# Patient Record
Sex: FEMALE | Race: WHITE | NOT HISPANIC OR LATINO | Employment: OTHER | URBAN - METROPOLITAN AREA
[De-identification: names, ages, dates, MRNs, and addresses within clinical notes are randomized per-mention and may not be internally consistent; named-entity substitution may affect disease eponyms.]

---

## 2023-08-23 ENCOUNTER — HOSPITAL ENCOUNTER (OUTPATIENT)
Dept: DATA CONVERSION | Facility: HOSPITAL | Age: 63
Discharge: HOME | End: 2023-08-23
Payer: MEDICARE

## 2023-08-23 DIAGNOSIS — Z87.891 PERSONAL HISTORY OF NICOTINE DEPENDENCE: ICD-10-CM

## 2023-08-23 DIAGNOSIS — Z12.31 ENCOUNTER FOR SCREENING MAMMOGRAM FOR MALIGNANT NEOPLASM OF BREAST: ICD-10-CM

## 2023-08-23 DIAGNOSIS — Z78.0 ASYMPTOMATIC MENOPAUSAL STATE: ICD-10-CM

## 2023-08-23 DIAGNOSIS — M19.011 PRIMARY OSTEOARTHRITIS, RIGHT SHOULDER: ICD-10-CM

## 2023-08-29 ENCOUNTER — HOSPITAL ENCOUNTER (OUTPATIENT)
Dept: DATA CONVERSION | Facility: HOSPITAL | Age: 63
End: 2023-08-29

## 2023-08-29 DIAGNOSIS — M19.011 PRIMARY OSTEOARTHRITIS, RIGHT SHOULDER: ICD-10-CM

## 2023-08-31 PROBLEM — E83.52 HYPERCALCEMIA: Status: ACTIVE | Noted: 2023-08-31

## 2023-08-31 PROBLEM — G43.909 MIGRAINE: Status: ACTIVE | Noted: 2023-08-31

## 2023-08-31 PROBLEM — E78.5 HYPERLIPIDEMIA: Status: ACTIVE | Noted: 2023-08-31

## 2023-08-31 PROBLEM — I10 ESSENTIAL HYPERTENSION: Status: ACTIVE | Noted: 2023-08-31

## 2023-08-31 PROBLEM — Z78.9 MEDICAL HOME PATIENT: Status: ACTIVE | Noted: 2023-08-31

## 2023-08-31 PROBLEM — E03.9 HYPOTHYROIDISM: Status: ACTIVE | Noted: 2023-08-31

## 2023-08-31 PROBLEM — N95.1 FEMALE CLIMACTERIC: Status: ACTIVE | Noted: 2023-08-31

## 2023-08-31 PROBLEM — F32.A DEPRESSIVE DISORDER: Status: ACTIVE | Noted: 2023-08-31

## 2023-08-31 PROBLEM — M19.90 ARTHRITIS: Status: ACTIVE | Noted: 2023-08-31

## 2023-08-31 PROBLEM — N20.0 CALCULUS OF KIDNEY: Status: ACTIVE | Noted: 2023-08-31

## 2023-08-31 PROBLEM — R94.31 ELECTROCARDIOGRAM ABNORMAL: Status: ACTIVE | Noted: 2023-08-31

## 2023-08-31 PROBLEM — E55.9 VITAMIN D DEFICIENCY: Status: ACTIVE | Noted: 2023-08-31

## 2023-08-31 PROBLEM — I45.81 LONG QT SYNDROME: Status: ACTIVE | Noted: 2023-08-31

## 2023-08-31 PROBLEM — D72.829 LEUKOCYTOSIS: Status: ACTIVE | Noted: 2023-08-31

## 2023-08-31 PROBLEM — G89.29 CHRONIC PAIN: Status: ACTIVE | Noted: 2023-08-31

## 2023-08-31 RX ORDER — BUPROPION HYDROCHLORIDE 300 MG/1
1 TABLET ORAL EVERY MORNING
COMMUNITY
End: 2023-10-05 | Stop reason: SDUPTHER

## 2023-08-31 RX ORDER — ATORVASTATIN CALCIUM 40 MG/1
40 TABLET, FILM COATED ORAL DAILY
COMMUNITY
End: 2024-01-09 | Stop reason: SDUPTHER

## 2023-08-31 RX ORDER — NAPROXEN SODIUM 220 MG
440 TABLET ORAL
COMMUNITY

## 2023-08-31 RX ORDER — SERTRALINE HYDROCHLORIDE 100 MG/1
200 TABLET, FILM COATED ORAL DAILY
COMMUNITY
Start: 2023-01-06 | End: 2024-01-09 | Stop reason: SDUPTHER

## 2023-08-31 RX ORDER — LOSARTAN POTASSIUM 100 MG/1
1 TABLET ORAL DAILY
COMMUNITY

## 2023-08-31 RX ORDER — VIT C/E/ZN/COPPR/LUTEIN/ZEAXAN 250MG-90MG
1 CAPSULE ORAL DAILY
COMMUNITY

## 2023-08-31 RX ORDER — LEVOTHYROXINE SODIUM 200 UG/1
200 TABLET ORAL
COMMUNITY
End: 2024-01-09 | Stop reason: ALTCHOICE

## 2023-09-02 RX ORDER — AMOXICILLIN 500 MG/1
CAPSULE ORAL
COMMUNITY
Start: 2023-03-20 | End: 2023-10-03 | Stop reason: ALTCHOICE

## 2023-09-02 RX ORDER — ALBUTEROL SULFATE 90 UG/1
2 AEROSOL, METERED RESPIRATORY (INHALATION) EVERY 4 HOURS PRN
COMMUNITY
Start: 2023-01-06 | End: 2023-10-03 | Stop reason: ALTCHOICE

## 2023-09-02 RX ORDER — CYCLOBENZAPRINE HCL 10 MG
TABLET ORAL
COMMUNITY
Start: 2023-06-22 | End: 2023-10-03 | Stop reason: ALTCHOICE

## 2023-09-02 RX ORDER — METHYLPREDNISOLONE 4 MG/1
TABLET ORAL
COMMUNITY
Start: 2023-06-22 | End: 2023-10-03 | Stop reason: ALTCHOICE

## 2023-10-03 ENCOUNTER — LAB (OUTPATIENT)
Dept: LAB | Facility: LAB | Age: 63
End: 2023-10-03
Payer: MEDICARE

## 2023-10-03 ENCOUNTER — PRE-ADMISSION TESTING (OUTPATIENT)
Dept: PREADMISSION TESTING | Facility: HOSPITAL | Age: 63
End: 2023-10-03
Payer: MEDICARE

## 2023-10-03 VITALS
TEMPERATURE: 96.3 F | BODY MASS INDEX: 34.58 KG/M2 | OXYGEN SATURATION: 97 % | WEIGHT: 215.17 LBS | HEIGHT: 66 IN | RESPIRATION RATE: 16 BRPM | HEART RATE: 66 BPM

## 2023-10-03 DIAGNOSIS — M19.011 PRIMARY OSTEOARTHRITIS OF RIGHT SHOULDER: ICD-10-CM

## 2023-10-03 DIAGNOSIS — M19.011 PRIMARY OSTEOARTHRITIS OF RIGHT SHOULDER: Primary | ICD-10-CM

## 2023-10-03 DIAGNOSIS — M25.511 RIGHT SHOULDER PAIN, UNSPECIFIED CHRONICITY: Primary | ICD-10-CM

## 2023-10-03 DIAGNOSIS — E03.9 HYPOTHYROIDISM, UNSPECIFIED TYPE: ICD-10-CM

## 2023-10-03 LAB
ANION GAP SERPL CALC-SCNC: 12 MMOL/L
APPEARANCE UR: CLEAR
BILIRUB UR STRIP.AUTO-MCNC: NEGATIVE MG/DL
BUN SERPL-MCNC: 30 MG/DL (ref 8–25)
CALCIUM SERPL-MCNC: 9.8 MG/DL (ref 8.5–10.4)
CAOX CRY #/AREA UR COMP ASSIST: NORMAL /HPF
CHLORIDE SERPL-SCNC: 100 MMOL/L (ref 97–107)
CO2 SERPL-SCNC: 28 MMOL/L (ref 24–31)
COLOR UR: ABNORMAL
CREAT SERPL-MCNC: 0.8 MG/DL (ref 0.4–1.6)
GFR SERPL CREATININE-BSD FRML MDRD: 82 ML/MIN/1.73M*2
GLUCOSE SERPL-MCNC: 100 MG/DL (ref 65–99)
GLUCOSE UR STRIP.AUTO-MCNC: NORMAL MG/DL
KETONES UR STRIP.AUTO-MCNC: NEGATIVE MG/DL
LEUKOCYTE ESTERASE UR QL STRIP.AUTO: ABNORMAL
NITRITE UR QL STRIP.AUTO: NEGATIVE
PH UR STRIP.AUTO: 5.5 [PH]
POTASSIUM SERPL-SCNC: 5.2 MMOL/L (ref 3.4–5.1)
PROT UR STRIP.AUTO-MCNC: ABNORMAL MG/DL
RBC # UR STRIP.AUTO: NEGATIVE /UL
RBC #/AREA URNS AUTO: NORMAL /HPF
SODIUM SERPL-SCNC: 140 MMOL/L (ref 133–145)
SP GR UR STRIP.AUTO: 1.03
TSH SERPL DL<=0.05 MIU/L-ACNC: 1.65 MIU/L (ref 0.27–4.2)
UROBILINOGEN UR STRIP.AUTO-MCNC: NORMAL MG/DL
WBC #/AREA URNS AUTO: NORMAL /HPF

## 2023-10-03 PROCEDURE — 99203 OFFICE O/P NEW LOW 30 MIN: CPT | Performed by: PHYSICIAN ASSISTANT

## 2023-10-03 PROCEDURE — 36415 COLL VENOUS BLD VENIPUNCTURE: CPT

## 2023-10-03 PROCEDURE — 87086 URINE CULTURE/COLONY COUNT: CPT

## 2023-10-03 PROCEDURE — 87081 CULTURE SCREEN ONLY: CPT | Mod: CMCLAB,WESLAB | Performed by: PHYSICIAN ASSISTANT

## 2023-10-03 PROCEDURE — 81001 URINALYSIS AUTO W/SCOPE: CPT

## 2023-10-03 PROCEDURE — 80048 BASIC METABOLIC PNL TOTAL CA: CPT

## 2023-10-03 PROCEDURE — 84443 ASSAY THYROID STIM HORMONE: CPT

## 2023-10-03 RX ORDER — CHLORHEXIDINE GLUCONATE ORAL RINSE 1.2 MG/ML
15 SOLUTION DENTAL
Status: DISCONTINUED | OUTPATIENT
Start: 2023-10-12 | End: 2023-10-13 | Stop reason: HOSPADM

## 2023-10-03 RX ORDER — ACETAMINOPHEN 500 MG
1000 TABLET ORAL EVERY 8 HOURS PRN
COMMUNITY
End: 2024-01-09 | Stop reason: ALTCHOICE

## 2023-10-03 RX ORDER — CHLORHEXIDINE GLUCONATE ORAL RINSE 1.2 MG/ML
15 SOLUTION DENTAL AS NEEDED
Qty: 30 ML | Refills: 0 | Status: SHIPPED | OUTPATIENT
Start: 2023-10-12 | End: 2023-10-13 | Stop reason: HOSPADM

## 2023-10-03 ASSESSMENT — DUKE ACTIVITY SCORE INDEX (DASI)
CAN YOU RUN A SHORT DISTANCE: NO
CAN YOU PARTICIPATE IN MODERATE RECREATIONAL ACTIVITIES LIKE GOLF, BOWLING, DANCING, DOUBLES TENNIS OR THROWING A BASEBALL OR FOOTBALL: NO
CAN YOU WALK A BLOCK OR TWO ON LEVEL GROUND: NO
CAN YOU DO LIGHT WORK AROUND THE HOUSE LIKE DUSTING OR WASHING DISHES: YES
CAN YOU DO MODERATE WORK AROUND THE HOUSE LIKE VACUUMING, SWEEPING FLOORS OR CARRYING GROCERIES: YES
CAN YOU CLIMB A FLIGHT OF STAIRS OR WALK UP A HILL: YES
CAN YOU WALK INDOORS, SUCH AS AROUND YOUR HOUSE: YES
CAN YOU DO YARD WORK LIKE RAKING LEAVES, WEEDING OR PUSHING A MOWER: YES
CAN YOU DO HEAVY WORK AROUND THE HOUSE LIKE SCRUBBING FLOORS OR LIFTING AND MOVING HEAVY FURNITURE: YES
CAN YOU PARTICIPATE IN STRENOUS SPORTS LIKE SWIMMING, SINGLES TENNIS, FOOTBALL, BASKETBALL, OR SKIING: NO

## 2023-10-03 ASSESSMENT — LIFESTYLE VARIABLES: SMOKING_STATUS: SMOKER

## 2023-10-03 ASSESSMENT — CHADS2 SCORE
CHF: NO
DIABETES: NO
CHADS2 SCORE: 1
PRIOR STROKE OR TIA OR THROMBOEMBOLISM: NO
HYPERTENSION: YES
AGE GREATER THAN OR EQUAL TO 75: NO

## 2023-10-03 ASSESSMENT — PAIN - FUNCTIONAL ASSESSMENT: PAIN_FUNCTIONAL_ASSESSMENT: 0-10

## 2023-10-03 ASSESSMENT — PAIN SCALES - GENERAL: PAINLEVEL_OUTOF10: 5 - MODERATE PAIN

## 2023-10-03 ASSESSMENT — PAIN DESCRIPTION - DESCRIPTORS: DESCRIPTORS: ACHING;DULL

## 2023-10-03 ASSESSMENT — ACTIVITIES OF DAILY LIVING (ADL): ADL_SCORE: 0

## 2023-10-03 NOTE — CPM/PAT H&P
CPM/PAT Evaluation       Name: Monse Aguilera (Carlee Aguilera)  /Age: 1960/63 y.o.     In-Person       Chief Complaint: Right shoulder pain    HPI 63-year-old female complains of right shoulder pain    Past Medical History:   Diagnosis Date    Anxiety     Arthritis     Depression     History of transfusion     Hypothyroidism     Irregular heart beat     Nephrolithiasis        Past Surgical History:   Procedure Laterality Date    BUNIONECTOMY      CARDIAC DEFIBRILLATOR PLACEMENT Left     JOINT REPLACEMENT      LUMBAR FUSION      LUMBAR LAMINECTOMY      OOPHORECTOMY         Patient  has no history on file for sexual activity.    Family History   Problem Relation Name Age of Onset    Other (elevated lipids) Mother      Rheum arthritis Mother      Diabetes Mother      Hypertension Mother      Other (Elevated lipids) Father      Coronary artery disease Father      Kidney failure Father      Hypertension Father      Diabetes Father      Heart disease Father      Other (thyroid problems) Sister      Other (Cardiac arrest) Son      Hyperlipidemia Sibling         Allergies   Allergen Reactions    Adhesive Rash    Other Other    Silver-Calcium Alginate Rash    Sulfa (Sulfonamide Antibiotics) Rash       Prior to Admission medications    Medication Sig Start Date End Date Taking? Authorizing Provider   acetaminophen (Tylenol) 500 mg tablet Take 2 tablets (1,000 mg) by mouth every 8 hours if needed for mild pain (1 - 3).    Historical Provider, MD   atorvastatin (Lipitor) 40 mg tablet Take 1 tablet (40 mg) by mouth once daily.    Historical Provider, MD   buPROPion XL (Wellbutrin XL) 300 mg 24 hr tablet Take 1 tablet (300 mg) by mouth once daily in the morning.    Historical Provider, MD   cholecalciferol (Vitamin D-3) 25 MCG (1000 UT) capsule Take 1 capsule (25 mcg) by mouth once daily.    Historical Provider, MD   levothyroxine (Synthroid, Levoxyl) 200 mcg tablet Take 1 tablet (200 mcg) by mouth once daily in the  morning. Take before meals.    Historical Provider, MD   losartan (Cozaar) 100 mg tablet Take 1 tablet (100 mg) by mouth once daily.    Historical Provider, MD   multivitamin/iron/folic acid (MULTI COMPLETE WITH IRON ORAL) Take 1 tablet by mouth once daily.    Historical Provider, MD   naproxen sodium (Aleve) 220 mg tablet Take 2 tablets (440 mg) by mouth 2 times a day with meals.    Historical Provider, MD   propranolol XL (Innopran XL) 120 mg 24 hr capsule Take 2 capsules (240 mg) by mouth once daily.    Historical Provider, MD   sertraline (Zoloft) 100 mg tablet Take 2 tablets (200 mg) by mouth once daily. 1/6/23   Historical Provider, MD   albuterol 90 mcg/actuation inhaler Inhale 2 puffs every 4 hours if needed. 1/6/23 10/3/23  Historical Provider, MD   amoxicillin (Amoxil) 500 mg capsule TAKE ONE CAPSULE BY MOUTH EVERY 8 HOURS UNTIL GONE 3/20/23 10/3/23  Historical Provider, MD   cyclobenzaprine (Flexeril) 10 mg tablet TAKE ONE TABLET BY MOUTH EVERY 8 HOURS AS NEEDED FOR MUSCLE SPASM 6/22/23 10/3/23  Historical Provider, MD   methylPREDNISolone (Medrol Dospak) 4 mg tablets use as directed 6/22/23 10/3/23  Historical Provider, MD        Review of Systems   All other systems reviewed and are negative.       Physical Exam  Constitutional:       Appearance: Normal appearance.   HENT:      Head: Normocephalic and atraumatic.      Mouth/Throat:      Mouth: Mucous membranes are moist.   Eyes:      Extraocular Movements: Extraocular movements intact.      Pupils: Pupils are equal, round, and reactive to light.   Cardiovascular:      Rate and Rhythm: Normal rate and regular rhythm.   Abdominal:      General: Bowel sounds are normal.      Palpations: Abdomen is soft.   Musculoskeletal:         General: Normal range of motion.      Cervical back: Normal range of motion.   Neurological:      General: No focal deficit present.      Mental Status: She is alert and oriented to person, place, and time.   Psychiatric:          Mood and Affect: Mood normal.         Behavior: Behavior normal.          Airway    Visit Vitals  Pulse 66   Temp 35.7 °C (96.3 °F) (Tympanic)   Resp 16       DASI Risk Score    No data to display       Caprini DVT Assessment      Flowsheet Row Most Recent Value   DVT Score 6   Current Status Major surgery planned, including arthroscopic and laproscopic (1-2 hours)   History Prior major surgery   Age 60-75 years   BMI 31-40 (Obesity)          Modified Frailty Index      Flowsheet Row Most Recent Value   Modified Frailty Index Calculator .1818          CHADS2 Stroke Risk  Current as of a minute ago        N/A 3 - 100%: High Risk   2 - 3%: Medium Risk   0 - 2%: Low Risk     Last Change: N/A          This score determines the patient's risk of having a stroke if the patient has atrial fibrillation.        This score is not applicable to this patient. Components are not calculated.          Revised Cardiac Risk Index      Flowsheet Row Most Recent Value   Revised Cardiac Risk Calculator 0          Apfel Simplified Score      Flowsheet Row Most Recent Value   Apfel Simplified Score Calculator 2          Risk Analysis Index Results This Encounter         10/3/2023  0908             BARBA Cancer History: Patient does not indicate history of cancer    Total Risk Analysis Index Score Without Cancer: 21    Total Risk Analysis Index Score: 21          Stop Bang Score      Flowsheet Row Most Recent Value   Do you snore loudly? 0   Do you often feel tired or fatigued after your sleep? 0   Has anyone ever observed you stop breathing in your sleep? 0   Do you have or are you being treated for high blood pressure? 1   Recent BMI (Calculated) 34.8   Is BMI greater than 35 kg/m2? 0=No   Age older than 50 years old? 1=Yes   Is your neck circumference greater than 17 inches (Male) or 16 inches (Female)? 0            Assessment and Plan:     Musculoskeletal:     Right shoulder osteoarthritis             Plan: Right total shoulder  replacement 10/13/2023

## 2023-10-03 NOTE — PREPROCEDURE INSTRUCTIONS
Medication List            Accurate as of October 3, 2023  9:10 AM. Always use your most recent med list.                acetaminophen 500 mg tablet  Commonly known as: Tylenol     Aleve 220 mg tablet  Generic drug: naproxen sodium  Notes to patient: Stop 5 days before     atorvastatin 40 mg tablet  Commonly known as: Lipitor     buPROPion  mg 24 hr tablet  Commonly known as: Wellbutrin XL  Medication Adjustments for Surgery: Take morning of surgery with sip of water, no other fluids     cholecalciferol 25 MCG (1000 UT) capsule  Commonly known as: Vitamin D-3  Medication Adjustments for Surgery: Stop 7 days before surgery     levothyroxine 200 mcg tablet  Commonly known as: Synthroid, Levoxyl  Medication Adjustments for Surgery: Take morning of surgery with sip of water, no other fluids     losartan 100 mg tablet  Commonly known as: Cozaar  Notes to patient: Do not take morning of surgery     MULTI COMPLETE WITH IRON ORAL  Medication Adjustments for Surgery: Stop 7 days before surgery     propranolol  mg 24 hr capsule  Commonly known as: Innopran XL  Medication Adjustments for Surgery: Take morning of surgery with sip of water, no other fluids     Zoloft 100 mg tablet  Generic drug: sertraline  Medication Adjustments for Surgery: Take morning of surgery with sip of water, no other fluids                              NPO Instructions:    Do not eat any food after midnight the night before your surgery/procedure.    Additional Instructions:     Begin using your [facility] PAT DISCHARGE INSTRUCTIONS    Please call the Same Day Surgery Department of the hospital where your procedure will be performed after 2:00PM the day before your surgery. If you are scheduled on a Monday or a Tuesday following a Monday holiday, you will need to call on the last business day prior to your surgery date.      [facility]  [address] [place] [address] [phone]    [facility]  [address]  [place] [address]  [phone]    [facility]  [address] [place] [address]  [phone]        Please let your surgeon know if:      You develop any open sores, shingles, burning or painful urination as these may increase your risk of an infection.   You no longer wish to have the surgery.   Any other personal circumstances change that may lead to the need to cancel or defer this surgery-such as being sick or getting admitted to any hospital within one week of your planned procedure.    Your contact details change, such as a change of address or phone number.    Starting now:     Please DO NOT drink alcohol or smoke for 24 before surgery. It is well known that quitting smoking can make a huge difference to your health and recovery from surgery. The longer you abstain from smoking, the better your chances of a healthy recovery. If you need help with quitting, call 2-771-QUIT-NOW to be connected to a trained counselor who will discuss the best methods to help you quit.       Before your surgery:     Please stop all supplements 7 days prior to surgery.    Please stop taking NSAID pain medicine such as Advil and Motrin 5 days before surgery or as instructed by your surgeon.   If you develop any fever, cough, cold, rashes, cuts, scratches, scrapes, urinary symptoms or infection anywhere on your body (including teeth and gums) prior to surgery, please call your surgeon’s office as soon as possible. This may require treatment to reduce the chance of cancellation on the day of surgery.    The day before your surgery:     DIET- Do not eat any solid food or drink after midnight the night before surgery. This includes gum, hard candy, mints and coffee.    Get a good night’s rest. Use the special soap for bathing if you have been instructed to use one.    Arrival time is typically 2 hours prior to the time of surgery.    Scheduled surgery times may change and you will be notified  if this occurs - please check your personal voicemail for any updates.     On the morning of surgery:   Wear comfortable, loose fitting clothes which open in the front. Please do not wear moisturizers, creams, lotions, perfume or makeup.     Please bring with you to surgery:   Photo ID and insurance card   Current list of medicines and allergies   Pacemaker/ Defibrillator/Heart stent cards   CPAP machine and mask    Slings/ splints/ crutches   A copy of your complete advanced directive/DHPOA.    Please do NOT bring with you to surgery:   All jewelry and valuables should be left at home.   Prosthetic devices such as contact lenses, hearing aids, dentures, eyelash extensions, hairpins and body piercings must be removed prior to going in to the surgical suite.      After outpatient surgery:   A responsible adult MUST accompany you at the time of discharge and stay with you for 24 hours after your surgery. You may NOT drive yourself home after surgery.    Do not drive, operate machinery, make critical decisions or do activities that require co-ordination or balance until after a night’s sleep.   Do not drink alcoholic beverages for 24 hours.   Instructions for resuming your medications will be provided by your surgeon.      CALL YOUR DOCTOR AFTER SURGERY IF YOU HAVE:     Chills and/or a fever of 101° F or higher.    Redness, swelling, pus or drainage from your surgical wound or a bad smell from the wound.    Lightheadedness, fainting or confusion.    Persistent vomiting (throwing up) and are not able to eat or drink for 12 hours.    Three or more loose, watery bowel movements in 24 hours (diarrhea).   Difficulty or pain while urinating( after non-urological surgery)    Pain and swelling in your legs, especially if it is only on one side.    Difficulty breathing or are breathing faster than normal.    Any new concerning symptoms.

## 2023-10-04 ENCOUNTER — TELEPHONE (OUTPATIENT)
Dept: PRIMARY CARE | Facility: CLINIC | Age: 63
End: 2023-10-04
Payer: MEDICARE

## 2023-10-04 DIAGNOSIS — G43.E19 INTRACTABLE CHRONIC MIGRAINE WITH AURA AND WITHOUT STATUS MIGRAINOSUS: Primary | ICD-10-CM

## 2023-10-04 DIAGNOSIS — F32.A DEPRESSIVE DISORDER: ICD-10-CM

## 2023-10-04 NOTE — TELEPHONE ENCOUNTER
From ans machine 10/04/23 @12:31pm:  Pt asking for refills for Propanolol 90 days dispensing 180 and Bupropion 300 mg for 90 days to [facility].

## 2023-10-05 LAB
BACTERIA UR CULT: NORMAL
STAPHYLOCOCCUS SPEC CULT: NORMAL

## 2023-10-05 RX ORDER — BUPROPION HYDROCHLORIDE 300 MG/1
300 TABLET ORAL EVERY MORNING
Qty: 90 TABLET | Refills: 0 | Status: SHIPPED | OUTPATIENT
Start: 2023-10-05 | End: 2023-10-09 | Stop reason: SDUPTHER

## 2023-10-09 ENCOUNTER — TELEPHONE (OUTPATIENT)
Dept: PRIMARY CARE | Facility: CLINIC | Age: 63
End: 2023-10-09
Payer: MEDICARE

## 2023-10-09 DIAGNOSIS — F32.A DEPRESSIVE DISORDER: ICD-10-CM

## 2023-10-09 DIAGNOSIS — G43.E19 INTRACTABLE CHRONIC MIGRAINE WITH AURA AND WITHOUT STATUS MIGRAINOSUS: ICD-10-CM

## 2023-10-09 RX ORDER — BUPROPION HYDROCHLORIDE 300 MG/1
300 TABLET ORAL EVERY MORNING
Qty: 90 TABLET | Refills: 0 | Status: SHIPPED | OUTPATIENT
Start: 2023-10-09 | End: 2023-10-09 | Stop reason: SDUPTHER

## 2023-10-09 RX ORDER — BUPROPION HYDROCHLORIDE 300 MG/1
300 TABLET ORAL EVERY MORNING
Qty: 90 TABLET | Refills: 0 | Status: SHIPPED | OUTPATIENT
Start: 2023-10-09 | End: 2024-01-09 | Stop reason: SDUPTHER

## 2023-10-11 ENCOUNTER — PREP FOR PROCEDURE (OUTPATIENT)
Dept: ORTHOPEDIC SURGERY | Facility: CLINIC | Age: 63
End: 2023-10-11
Payer: MEDICARE

## 2023-10-11 DIAGNOSIS — M19.011 OSTEOARTHRITIS OF RIGHT SHOULDER, UNSPECIFIED OSTEOARTHRITIS TYPE: Primary | ICD-10-CM

## 2023-10-11 RX ORDER — CEFAZOLIN SODIUM 2 G/100ML
2 INJECTION, SOLUTION INTRAVENOUS ONCE
Status: CANCELLED | OUTPATIENT
Start: 2023-10-11 | End: 2023-10-11

## 2023-10-11 RX ORDER — CEFAZOLIN SODIUM 2 G/100ML
2 INJECTION, SOLUTION INTRAVENOUS EVERY 8 HOURS
Status: CANCELLED | OUTPATIENT
Start: 2023-10-11

## 2023-10-12 PROBLEM — M19.011 OSTEOARTHRITIS OF RIGHT SHOULDER: Status: ACTIVE | Noted: 2023-10-11

## 2023-10-13 ENCOUNTER — ANESTHESIA (OUTPATIENT)
Dept: OPERATING ROOM | Facility: HOSPITAL | Age: 63
End: 2023-10-13
Payer: MEDICARE

## 2023-10-13 ENCOUNTER — ANESTHESIA EVENT (OUTPATIENT)
Dept: OPERATING ROOM | Facility: HOSPITAL | Age: 63
End: 2023-10-13
Payer: MEDICARE

## 2023-10-13 ENCOUNTER — HOSPITAL ENCOUNTER (OUTPATIENT)
Facility: HOSPITAL | Age: 63
Discharge: HOME | End: 2023-10-13
Attending: ORTHOPAEDIC SURGERY | Admitting: INTERNAL MEDICINE
Payer: MEDICARE

## 2023-10-13 VITALS
DIASTOLIC BLOOD PRESSURE: 56 MMHG | TEMPERATURE: 96.8 F | HEART RATE: 79 BPM | SYSTOLIC BLOOD PRESSURE: 113 MMHG | RESPIRATION RATE: 18 BRPM | OXYGEN SATURATION: 93 %

## 2023-10-13 DIAGNOSIS — M19.011 PRIMARY OSTEOARTHRITIS OF RIGHT SHOULDER: ICD-10-CM

## 2023-10-13 DIAGNOSIS — M19.011 ARTHRITIS OF RIGHT SHOULDER REGION: Primary | ICD-10-CM

## 2023-10-13 LAB
ERYTHROCYTE [DISTWIDTH] IN BLOOD BY AUTOMATED COUNT: 13.7 % (ref 11.5–14.5)
HCT VFR BLD AUTO: 45.4 % (ref 36–46)
HGB BLD-MCNC: 14.7 G/DL (ref 12–16)
MCH RBC QN AUTO: 29.1 PG (ref 26–34)
MCHC RBC AUTO-ENTMCNC: 32.4 G/DL (ref 32–36)
MCV RBC AUTO: 90 FL (ref 80–100)
NRBC BLD-RTO: 0 /100 WBCS (ref 0–0)
PLATELET # BLD AUTO: 306 X10*3/UL (ref 150–450)
PMV BLD AUTO: 10.6 FL (ref 7.5–11.5)
RBC # BLD AUTO: 5.05 X10*6/UL (ref 4–5.2)
WBC # BLD AUTO: 10.8 X10*3/UL (ref 4.4–11.3)

## 2023-10-13 PROCEDURE — 2500000005 HC RX 250 GENERAL PHARMACY W/O HCPCS: Performed by: ANESTHESIOLOGIST ASSISTANT

## 2023-10-13 PROCEDURE — 3600000009 HC OR TIME - EACH INCREMENTAL 1 MINUTE - PROCEDURE LEVEL FOUR: Performed by: ORTHOPAEDIC SURGERY

## 2023-10-13 PROCEDURE — 7100000010 HC PHASE TWO TIME - EACH INCREMENTAL 1 MINUTE: Performed by: ORTHOPAEDIC SURGERY

## 2023-10-13 PROCEDURE — 7100000002 HC RECOVERY ROOM TIME - EACH INCREMENTAL 1 MINUTE: Performed by: ORTHOPAEDIC SURGERY

## 2023-10-13 PROCEDURE — 94640 AIRWAY INHALATION TREATMENT: CPT

## 2023-10-13 PROCEDURE — 2500000002 HC RX 250 W HCPCS SELF ADMINISTERED DRUGS (ALT 637 FOR MEDICARE OP, ALT 636 FOR OP/ED): Mod: MUE | Performed by: ANESTHESIOLOGY

## 2023-10-13 PROCEDURE — 7100000009 HC PHASE TWO TIME - INITIAL BASE CHARGE: Performed by: ORTHOPAEDIC SURGERY

## 2023-10-13 PROCEDURE — 2500000004 HC RX 250 GENERAL PHARMACY W/ HCPCS (ALT 636 FOR OP/ED): Performed by: ANESTHESIOLOGY

## 2023-10-13 PROCEDURE — 76942 ECHO GUIDE FOR BIOPSY: CPT | Performed by: ANESTHESIOLOGY

## 2023-10-13 PROCEDURE — 3700000002 HC GENERAL ANESTHESIA TIME - EACH INCREMENTAL 1 MINUTE: Performed by: ORTHOPAEDIC SURGERY

## 2023-10-13 PROCEDURE — A23472 PR RECONSTR TOTAL SHOULDER IMPLANT: Performed by: ANESTHESIOLOGIST ASSISTANT

## 2023-10-13 PROCEDURE — 2500000005 HC RX 250 GENERAL PHARMACY W/O HCPCS: Performed by: ORTHOPAEDIC SURGERY

## 2023-10-13 PROCEDURE — 2720000007 HC OR 272 NO HCPCS: Performed by: ORTHOPAEDIC SURGERY

## 2023-10-13 PROCEDURE — 7100000001 HC RECOVERY ROOM TIME - INITIAL BASE CHARGE: Performed by: ORTHOPAEDIC SURGERY

## 2023-10-13 PROCEDURE — 36415 COLL VENOUS BLD VENIPUNCTURE: CPT | Performed by: ANESTHESIOLOGY

## 2023-10-13 PROCEDURE — 2500000001 HC RX 250 WO HCPCS SELF ADMINISTERED DRUGS (ALT 637 FOR MEDICARE OP): Performed by: ORTHOPAEDIC SURGERY

## 2023-10-13 PROCEDURE — 2580000001 HC RX 258 IV SOLUTIONS: Performed by: ANESTHESIOLOGIST ASSISTANT

## 2023-10-13 PROCEDURE — C1776 JOINT DEVICE (IMPLANTABLE): HCPCS | Performed by: ORTHOPAEDIC SURGERY

## 2023-10-13 PROCEDURE — 2500000004 HC RX 250 GENERAL PHARMACY W/ HCPCS (ALT 636 FOR OP/ED): Performed by: ANESTHESIOLOGIST ASSISTANT

## 2023-10-13 PROCEDURE — C1713 ANCHOR/SCREW BN/BN,TIS/BN: HCPCS | Performed by: ORTHOPAEDIC SURGERY

## 2023-10-13 PROCEDURE — 3700000001 HC GENERAL ANESTHESIA TIME - INITIAL BASE CHARGE: Performed by: ORTHOPAEDIC SURGERY

## 2023-10-13 PROCEDURE — 2780000003 HC OR 278 NO HCPCS: Performed by: ORTHOPAEDIC SURGERY

## 2023-10-13 PROCEDURE — A23472 PR RECONSTR TOTAL SHOULDER IMPLANT: Performed by: ANESTHESIOLOGY

## 2023-10-13 PROCEDURE — 85027 COMPLETE CBC AUTOMATED: CPT | Performed by: ANESTHESIOLOGY

## 2023-10-13 PROCEDURE — 3600000004 HC OR TIME - INITIAL BASE CHARGE - PROCEDURE LEVEL FOUR: Performed by: ORTHOPAEDIC SURGERY

## 2023-10-13 PROCEDURE — 7100000011 HC EXTENDED STAY RECOVERY HOURLY - NURSING UNIT

## 2023-10-13 DEVICE — IMPLANTABLE DEVICE: Type: IMPLANTABLE DEVICE | Site: SHOULDER | Status: FUNCTIONAL

## 2023-10-13 DEVICE — DYNANITE VIP GLENOID PIN, NITINOL, 2.8MM
Type: IMPLANTABLE DEVICE | Site: SHOULDER | Status: FUNCTIONAL
Brand: ARTHREX®

## 2023-10-13 RX ORDER — IPRATROPIUM BROMIDE 0.5 MG/2.5ML
500 SOLUTION RESPIRATORY (INHALATION) ONCE
Status: DISCONTINUED | OUTPATIENT
Start: 2023-10-13 | End: 2023-10-13 | Stop reason: HOSPADM

## 2023-10-13 RX ORDER — LABETALOL HYDROCHLORIDE 5 MG/ML
5 INJECTION, SOLUTION INTRAVENOUS ONCE AS NEEDED
Status: DISCONTINUED | OUTPATIENT
Start: 2023-10-13 | End: 2023-10-13 | Stop reason: HOSPADM

## 2023-10-13 RX ORDER — MELOXICAM 7.5 MG/1
15 TABLET ORAL ONCE
Status: COMPLETED | OUTPATIENT
Start: 2023-10-13 | End: 2023-10-13

## 2023-10-13 RX ORDER — ONDANSETRON HYDROCHLORIDE 2 MG/ML
4 INJECTION, SOLUTION INTRAVENOUS ONCE AS NEEDED
Status: DISCONTINUED | OUTPATIENT
Start: 2023-10-13 | End: 2023-10-13 | Stop reason: HOSPADM

## 2023-10-13 RX ORDER — NORETHINDRONE AND ETHINYL ESTRADIOL 0.5-0.035
KIT ORAL AS NEEDED
Status: DISCONTINUED | OUTPATIENT
Start: 2023-10-13 | End: 2023-10-13

## 2023-10-13 RX ORDER — FENTANYL CITRATE 50 UG/ML
INJECTION, SOLUTION INTRAMUSCULAR; INTRAVENOUS AS NEEDED
Status: DISCONTINUED | OUTPATIENT
Start: 2023-10-13 | End: 2023-10-13

## 2023-10-13 RX ORDER — SODIUM CHLORIDE, SODIUM LACTATE, POTASSIUM CHLORIDE, CALCIUM CHLORIDE 600; 310; 30; 20 MG/100ML; MG/100ML; MG/100ML; MG/100ML
50 INJECTION, SOLUTION INTRAVENOUS CONTINUOUS
Status: CANCELLED | OUTPATIENT
Start: 2023-10-13

## 2023-10-13 RX ORDER — NEOSTIGMINE METHYLSULFATE 1 MG/ML
INJECTION, SOLUTION INTRAVENOUS AS NEEDED
Status: DISCONTINUED | OUTPATIENT
Start: 2023-10-13 | End: 2023-10-13

## 2023-10-13 RX ORDER — MIDAZOLAM HYDROCHLORIDE 1 MG/ML
2 INJECTION, SOLUTION INTRAMUSCULAR; INTRAVENOUS ONCE
Status: COMPLETED | OUTPATIENT
Start: 2023-10-13 | End: 2023-10-13

## 2023-10-13 RX ORDER — OXYCODONE HYDROCHLORIDE 5 MG/1
5 TABLET ORAL EVERY 6 HOURS PRN
Qty: 25 TABLET | Refills: 0 | Status: SHIPPED | OUTPATIENT
Start: 2023-10-13 | End: 2023-10-19

## 2023-10-13 RX ORDER — OXYCODONE HCL 10 MG/1
20 TABLET, FILM COATED, EXTENDED RELEASE ORAL ONCE
Status: COMPLETED | OUTPATIENT
Start: 2023-10-13 | End: 2023-10-13

## 2023-10-13 RX ORDER — ACETAMINOPHEN 500 MG
500 TABLET ORAL ONCE
Status: DISCONTINUED | OUTPATIENT
Start: 2023-10-13 | End: 2023-10-13 | Stop reason: HOSPADM

## 2023-10-13 RX ORDER — CEFAZOLIN SODIUM 2 G/100ML
INJECTION, SOLUTION INTRAVENOUS
Status: COMPLETED
Start: 2023-10-13 | End: 2023-10-13

## 2023-10-13 RX ORDER — CEFAZOLIN SODIUM 2 G/100ML
INJECTION, SOLUTION INTRAVENOUS AS NEEDED
Status: DISCONTINUED | OUTPATIENT
Start: 2023-10-13 | End: 2023-10-13

## 2023-10-13 RX ORDER — MIDAZOLAM HYDROCHLORIDE 1 MG/ML
2 INJECTION INTRAMUSCULAR; INTRAVENOUS ONCE
Status: CANCELLED | OUTPATIENT
Start: 2023-10-13

## 2023-10-13 RX ORDER — LIDOCAINE HYDROCHLORIDE 10 MG/ML
INJECTION INFILTRATION; PERINEURAL AS NEEDED
Status: DISCONTINUED | OUTPATIENT
Start: 2023-10-13 | End: 2023-10-13

## 2023-10-13 RX ORDER — PHENYLEPHRINE HCL IN 0.9% NACL 1 MG/10 ML
SYRINGE (ML) INTRAVENOUS AS NEEDED
Status: DISCONTINUED | OUTPATIENT
Start: 2023-10-13 | End: 2023-10-13

## 2023-10-13 RX ORDER — TRANEXAMIC ACID 100 MG/ML
INJECTION, SOLUTION INTRAVENOUS AS NEEDED
Status: DISCONTINUED | OUTPATIENT
Start: 2023-10-13 | End: 2023-10-13

## 2023-10-13 RX ORDER — HYDRALAZINE HYDROCHLORIDE 20 MG/ML
5 INJECTION INTRAMUSCULAR; INTRAVENOUS EVERY 30 MIN PRN
Status: DISCONTINUED | OUTPATIENT
Start: 2023-10-13 | End: 2023-10-13 | Stop reason: HOSPADM

## 2023-10-13 RX ORDER — ALBUTEROL SULFATE 0.83 MG/ML
2.5 SOLUTION RESPIRATORY (INHALATION) ONCE AS NEEDED
Status: COMPLETED | OUTPATIENT
Start: 2023-10-13 | End: 2023-10-13

## 2023-10-13 RX ORDER — FENTANYL CITRATE 50 UG/ML
50 INJECTION, SOLUTION INTRAMUSCULAR; INTRAVENOUS EVERY 5 MIN PRN
Status: DISCONTINUED | OUTPATIENT
Start: 2023-10-13 | End: 2023-10-13 | Stop reason: HOSPADM

## 2023-10-13 RX ORDER — ROCURONIUM BROMIDE 10 MG/ML
INJECTION, SOLUTION INTRAVENOUS AS NEEDED
Status: DISCONTINUED | OUTPATIENT
Start: 2023-10-13 | End: 2023-10-13

## 2023-10-13 RX ORDER — FENTANYL CITRATE 50 UG/ML
100 INJECTION, SOLUTION INTRAMUSCULAR; INTRAVENOUS ONCE
Status: COMPLETED | OUTPATIENT
Start: 2023-10-13 | End: 2023-10-13

## 2023-10-13 RX ORDER — ONDANSETRON HYDROCHLORIDE 2 MG/ML
INJECTION, SOLUTION INTRAVENOUS AS NEEDED
Status: DISCONTINUED | OUTPATIENT
Start: 2023-10-13 | End: 2023-10-13

## 2023-10-13 RX ORDER — PROPOFOL 10 MG/ML
INJECTION, EMULSION INTRAVENOUS AS NEEDED
Status: DISCONTINUED | OUTPATIENT
Start: 2023-10-13 | End: 2023-10-13

## 2023-10-13 RX ORDER — ACETAMINOPHEN 500 MG
1000 TABLET ORAL EVERY 6 HOURS PRN
Qty: 30 TABLET | Refills: 0 | Status: SHIPPED | OUTPATIENT
Start: 2023-10-13 | End: 2024-01-09 | Stop reason: WASHOUT

## 2023-10-13 RX ORDER — GLYCOPYRROLATE 0.2 MG/ML
INJECTION INTRAMUSCULAR; INTRAVENOUS AS NEEDED
Status: DISCONTINUED | OUTPATIENT
Start: 2023-10-13 | End: 2023-10-13

## 2023-10-13 RX ORDER — PREGABALIN 75 MG/1
75 CAPSULE ORAL DAILY
Status: COMPLETED | OUTPATIENT
Start: 2023-10-13 | End: 2023-10-13

## 2023-10-13 RX ADMIN — OXYCODONE HYDROCHLORIDE 20 MG: 10 TABLET, FILM COATED, EXTENDED RELEASE ORAL at 08:03

## 2023-10-13 RX ADMIN — ROCURONIUM BROMIDE 20 MG: 10 INJECTION, SOLUTION INTRAVENOUS at 09:07

## 2023-10-13 RX ADMIN — Medication 200 MCG: at 08:42

## 2023-10-13 RX ADMIN — GLYCOPYRROLATE 0.6 MG: 0.2 INJECTION INTRAMUSCULAR; INTRAVENOUS at 11:11

## 2023-10-13 RX ADMIN — Medication 150 MCG: at 10:46

## 2023-10-13 RX ADMIN — MELOXICAM 15 MG: 7.5 TABLET ORAL at 08:04

## 2023-10-13 RX ADMIN — Medication 150 MCG: at 10:10

## 2023-10-13 RX ADMIN — Medication 150 MCG: at 10:22

## 2023-10-13 RX ADMIN — Medication 200 MCG: at 10:01

## 2023-10-13 RX ADMIN — POVIDONE-IODINE 1 KIT: 5 SOLUTION TOPICAL at 06:15

## 2023-10-13 RX ADMIN — SODIUM CHLORIDE, SODIUM LACTATE, POTASSIUM CHLORIDE, AND CALCIUM CHLORIDE: 600; 310; 30; 20 INJECTION, SOLUTION INTRAVENOUS at 08:20

## 2023-10-13 RX ADMIN — Medication 100 MCG: at 10:08

## 2023-10-13 RX ADMIN — EPHEDRINE SULFATE 5 MG: 50 INJECTION, SOLUTION INTRAVENOUS at 09:29

## 2023-10-13 RX ADMIN — ROCURONIUM BROMIDE 10 MG: 10 INJECTION, SOLUTION INTRAVENOUS at 10:29

## 2023-10-13 RX ADMIN — EPHEDRINE SULFATE 5 MG: 50 INJECTION, SOLUTION INTRAVENOUS at 09:45

## 2023-10-13 RX ADMIN — EPHEDRINE SULFATE 5 MG: 50 INJECTION, SOLUTION INTRAVENOUS at 09:19

## 2023-10-13 RX ADMIN — GLYCOPYRROLATE 0.4 MG: 0.2 INJECTION INTRAMUSCULAR; INTRAVENOUS at 10:55

## 2023-10-13 RX ADMIN — ROCURONIUM BROMIDE 50 MG: 10 INJECTION, SOLUTION INTRAVENOUS at 08:27

## 2023-10-13 RX ADMIN — CEFAZOLIN SODIUM 2 G: 2 INJECTION, SOLUTION INTRAVENOUS at 08:33

## 2023-10-13 RX ADMIN — Medication 200 MCG: at 08:43

## 2023-10-13 RX ADMIN — LIDOCAINE HYDROCHLORIDE 5 ML: 10 INJECTION, SOLUTION INFILTRATION; PERINEURAL at 08:27

## 2023-10-13 RX ADMIN — FENTANYL CITRATE 50 MCG: 50 INJECTION, SOLUTION INTRAMUSCULAR; INTRAVENOUS at 08:27

## 2023-10-13 RX ADMIN — DEXAMETHASONE SODIUM PHOSPHATE 4 MG: 4 INJECTION, SOLUTION INTRAMUSCULAR; INTRAVENOUS at 10:26

## 2023-10-13 RX ADMIN — Medication 150 MCG: at 10:31

## 2023-10-13 RX ADMIN — ROCURONIUM BROMIDE 10 MG: 10 INJECTION, SOLUTION INTRAVENOUS at 10:01

## 2023-10-13 RX ADMIN — Medication 200 MCG: at 08:55

## 2023-10-13 RX ADMIN — TRANEXAMIC ACID 1000 MG: 100 INJECTION, SOLUTION INTRAVENOUS at 08:42

## 2023-10-13 RX ADMIN — Medication 150 MCG: at 11:02

## 2023-10-13 RX ADMIN — SODIUM CHLORIDE, SODIUM LACTATE, POTASSIUM CHLORIDE, AND CALCIUM CHLORIDE: 600; 310; 30; 20 INJECTION, SOLUTION INTRAVENOUS at 09:34

## 2023-10-13 RX ADMIN — EPHEDRINE SULFATE 10 MG: 50 INJECTION, SOLUTION INTRAVENOUS at 09:32

## 2023-10-13 RX ADMIN — Medication 150 MCG: at 09:02

## 2023-10-13 RX ADMIN — EPHEDRINE SULFATE 5 MG: 50 INJECTION, SOLUTION INTRAVENOUS at 09:47

## 2023-10-13 RX ADMIN — Medication 100 MCG: at 09:40

## 2023-10-13 RX ADMIN — Medication 200 MCG: at 11:10

## 2023-10-13 RX ADMIN — PREGABALIN 75 MG: 75 CAPSULE ORAL at 08:03

## 2023-10-13 RX ADMIN — Medication 200 MCG: at 10:37

## 2023-10-13 RX ADMIN — NEOSTIGMINE METHYLSULFATE 2 MG: 1 INJECTION, SOLUTION INTRAVENOUS at 10:55

## 2023-10-13 RX ADMIN — PROPOFOL 140 MG: 10 INJECTION, EMULSION INTRAVENOUS at 08:27

## 2023-10-13 RX ADMIN — Medication 200 MCG: at 10:25

## 2023-10-13 RX ADMIN — FENTANYL CITRATE 25 MCG: 50 INJECTION, SOLUTION INTRAMUSCULAR; INTRAVENOUS at 08:55

## 2023-10-13 RX ADMIN — Medication 100 MCG: at 10:52

## 2023-10-13 RX ADMIN — Medication 100 MCG: at 09:45

## 2023-10-13 RX ADMIN — Medication 250 MCG: at 09:11

## 2023-10-13 RX ADMIN — FENTANYL CITRATE 50 MCG: 50 INJECTION INTRAMUSCULAR; INTRAVENOUS at 08:04

## 2023-10-13 RX ADMIN — EPHEDRINE SULFATE 5 MG: 50 INJECTION, SOLUTION INTRAVENOUS at 09:40

## 2023-10-13 RX ADMIN — ALBUTEROL SULFATE 2.5 MG: 2.5 SOLUTION RESPIRATORY (INHALATION) at 12:36

## 2023-10-13 RX ADMIN — ROCURONIUM BROMIDE 20 MG: 10 INJECTION, SOLUTION INTRAVENOUS at 09:36

## 2023-10-13 RX ADMIN — ONDANSETRON HYDROCHLORIDE 4 MG: 2 INJECTION INTRAMUSCULAR; INTRAVENOUS at 10:49

## 2023-10-13 RX ADMIN — EPHEDRINE SULFATE 5 MG: 50 INJECTION, SOLUTION INTRAVENOUS at 09:13

## 2023-10-13 RX ADMIN — Medication 100 MCG: at 09:34

## 2023-10-13 RX ADMIN — Medication 150 MCG: at 10:57

## 2023-10-13 RX ADMIN — NEOSTIGMINE METHYLSULFATE 3 MG: 1 INJECTION, SOLUTION INTRAVENOUS at 11:11

## 2023-10-13 RX ADMIN — Medication 100 MCG: at 09:47

## 2023-10-13 RX ADMIN — Medication 200 MCG: at 11:07

## 2023-10-13 RX ADMIN — EPHEDRINE SULFATE 5 MG: 50 INJECTION, SOLUTION INTRAVENOUS at 10:01

## 2023-10-13 RX ADMIN — FENTANYL CITRATE 25 MCG: 50 INJECTION, SOLUTION INTRAMUSCULAR; INTRAVENOUS at 08:50

## 2023-10-13 RX ADMIN — Medication 150 MCG: at 10:16

## 2023-10-13 RX ADMIN — Medication 150 MCG: at 10:40

## 2023-10-13 RX ADMIN — MIDAZOLAM HYDROCHLORIDE 2 MG: 1 INJECTION, SOLUTION INTRAMUSCULAR; INTRAVENOUS at 08:06

## 2023-10-13 RX ADMIN — EPHEDRINE SULFATE 5 MG: 50 INJECTION, SOLUTION INTRAVENOUS at 09:34

## 2023-10-13 SDOH — HEALTH STABILITY: MENTAL HEALTH: CURRENT SMOKER: 1

## 2023-10-13 ASSESSMENT — PAIN - FUNCTIONAL ASSESSMENT
PAIN_FUNCTIONAL_ASSESSMENT: 0-10
PAIN_FUNCTIONAL_ASSESSMENT: WONG-BAKER FACES
PAIN_FUNCTIONAL_ASSESSMENT: 0-10
PAIN_FUNCTIONAL_ASSESSMENT: WONG-BAKER FACES
PAIN_FUNCTIONAL_ASSESSMENT: 0-10

## 2023-10-13 ASSESSMENT — PAIN SCALES - GENERAL
PAINLEVEL_OUTOF10: 0 - NO PAIN
PAINLEVEL_OUTOF10: 8
PAINLEVEL_OUTOF10: 0 - NO PAIN
PAINLEVEL_OUTOF10: 0 - NO PAIN

## 2023-10-13 NOTE — OP NOTE
RIGHT ANATOMIC TOTAL SHOULDER REPLACEMENT (ARTREX (ECLYPSE VS APAX) REVERSE AVIALABLE, REQ NUSRAT, NATASHA, DECATES, HAGOPIAN  *PRE OP BLOCK* (R) Operative Note     Date: 10/13/2023  OR Location: [facility] OR    Name: Monse Aguilera, : 1960, Age: 63 y.o., MRN: [id number], Sex: female    Diagnosis  Pre-op Diagnosis     * Osteoarthritis of right shoulder, unspecified osteoarthritis type [M19.011] Post-op Diagnosis     * Osteoarthritis of right shoulder, unspecified osteoarthritis type [M19.011]     Procedures    * RIGHT ANATOMIC TOTAL SHOULDER REPLACEMENT (ARTREX (ECLYPSE VS APAX) REVERSE AVIALABLE, REQ NUSRAT, NATASHA, DECATES, HAGOPIAN  *PRE OP BLOCK*    Surgeons      * Anderson Tenorio - Primary    Resident/Fellow/Other Assistant:  Surgical Assistant: Loli Segundo    Procedure Summary  Anesthesia: General  ASA: III  Anesthesia Staff: Anesthesiologist: José Mckeon MD  C-AA: KISHOR Cruz  Estimated Blood Loss: 100mL  Intra-op Medications:   Medication Name Total Dose   ceFAZolin in dextrose (iso-os) (Ancef) 2 gram/100 mL IVPB  - Omnicell Override Pull Cannot be calculated   fentaNYL PF (Sublimaze) injection 100 mcg 50 mcg   meloxicam (Mobic) tablet 15 mg 15 mg   midazolam (Versed) injection 2 mg 2 mg   oxyCODONE ER (OxyCONTIN) 12 hr tablet 20 mg 20 mg   pregabalin (Lyrica) capsule 75 mg 75 mg              Anesthesia Record               Intraprocedure I/O Totals          Intake    LR 1000.00 mL    Phenylephrine Drip 0.00 mL    The total shown is the total volume documented since Anesthesia Start was filed.    Total Intake 1000 mL       Output    Est. Blood Loss 300 mL    Total Output 300 mL       Net    Net Volume 700 mL          Specimen: No specimens collected     Staff:   Circulator: Wes Lyons RN  Scrub Person: Ashly Addison         Drains and/or Catheters: * None in log *    Tourniquet Times:         Implants:  Implants       Type Name Action Serial No.      Joint GLENOID PIN, TARGETER, 2.8MM,  STAINLESS - [id number] Implanted      Joint IMPLANT SYSTEM, ECLIPSE SPEEDSCAP - [id number] Implanted      Joint SCREW, ECLIPSE CAGE, LARGE, 40MM - [id number] Implanted      Joint ECLIPSE TRUNNION, SLOTTED, TPS CAP, 45MM - [id number] Implanted      Joint HEAD, ECLIPSE HUMERAL, 45/17 - [id number] Implanted               Findings: Arthritis right shoulder    Indications: Monse Aguilera is an 63 y.o. female who is having surgery for M19.011.     The patient was seen in the preoperative area. The risks, benefits, complications, treatment options, non-operative alternatives, expected recovery and outcomes were discussed with the patient. The possibilities of reaction to medication, pulmonary aspiration, injury to surrounding structures, bleeding, recurrent infection, the need for additional procedures, failure to diagnose a condition, and creating a complication requiring transfusion or operation were discussed with the patient. The patient concurred with the proposed plan, giving informed consent.  The site of surgery was properly noted/marked if necessary per policy. The patient has been actively warmed in preoperative area. Preoperative antibiotics have been ordered and given within 1 hours of incision. Venous thrombosis prophylaxis are not indicated.    Procedure Details:   Patient taken the operating and administered general anesthesia after receiving a preoperative block.  She is placed in the beachchair position.  She was then prepped and draped in the usual sterile fashion.  Bony landmarks were identified and marked.  Standard incision was made starting the coracoid process extending over the upper arm.  Subtenons tissue was dissected with the Bovie.  The cephalic vein was identified.  The deltopectoral interval was opened.  The cephalic vein was retracted laterally with the deltoid.  Small venous branches were cauterized as needed.  The deltoid was mobilized the humeral head and a Brown retractor was inserted.  The  clavipectoral fascia was incised.  The axillary nerve was identified and protected about the case.  The leading edge of the pectoralis tendon was released.  The bicipital groove was opened.  It was traced up proximally through the rotator interval to the base of the coracoid process.  The biceps was then tenodesed to the upper edge of the pectoralis tendon with a #2FiberWire suture.  Remainder the biceps was tenotomized and removed.  A subscapularis peel was performed.  The inferior capsule was released.  The head was delivered into the field.  Peripheral osteophytes were removed.  An extramedullary cutting guide was used to marin the location of the head cut.  Anatomic head cut was performed with care taken to preserve the rotator cuff insertion.  The upper end of the humerus were repaired.  A size 45 trunnion was placed over the operative the humerus.  It was felt to fit well.  It was impacted down.  Central reaming was performed.  The chest piece was inserted.  A guidewire was inserted.  Large cage screw was noted.  And Was then inserted.  Attention was directed towards the glenoid.  The posterior glenoid retractor was inserted.  The subscapularis was released superior medially and inferiorly with my finger placed over the axillary nerve.  Peripheral labral tissue was removed.  Biceps stump was removed.  The inferior capsular release was performed with my finger over the axillary nerve.  The glenoid was exposed.  Anterior glenoid retractor was inserted.  The central aspect of the glenoid was marked.  Next using a drill guide a guidewire was placed into the central portion of the glenoid.  It was then reamed centrally.  At that point using a guide superior and inferior drill holes were performed.  The inferior holes were connected with the broach.  Trial glenoid was inserted and felt to fit well.  That point the glenoid was prepared for cement.  It was first prepared with peroxide sponge.  It was then irrigated and  dried sponge was inserted.  Cement was then mixed to it doughy consistency it was inserted 3 times in the superior and inferior holes.  Excess cement was removed.  At that point a medium glenoid was impacted down feel the fit quite well.  Since then directed towards the humerus.  Anchors were then placed in the medial edge of the humerus.  3 sets of anchors were placed.  The 45 trunnion was then impacted over the chest piece.  A central cage was inserted.  It had excellent purchase.  The humeral head was trialed.  A size 45 was felt to be the appropriate size.  At that point 2 holes were made for placement of additional anchors.  One was placed in the greater tuberosity.  Was placed in the bicipital groove inferiorly.  45/17 Eclipse head was then impacted down over the upper end of the humerus.  Was then reduced.  It had allowed 40 degrees external rotation with good coverage, 50% pushback, and 60 degrees internal rotation.  Attention was then directed towards placement of the sutures.  Sutures were passed to the subscapularis using a scorpion.  The FiberWire sutures passed through the subscap were then tied as a medial row.  With the arm held in proxy 4 degrees external rotation.  Next 1 suture from each anchor was loaded through a Arthrex corkscrew anchor.  One was placed in the greater tuberosity and one was placed in the bicipital groove inferiorly.  Excess sutures were removed.  A fiber tape suture was placed in the rotator interval.  Extensive irrigation was performed.  Vancomycin powder was inserted.  The deltopectoral interval was closed with 0.0 Vicryl.  Tissues closed with 3.0 Vicryl.  It was closed with four-point 0 Monocryl subcuticular suture.  Steri-Strips were applied.  Placed in a sterile dressing and sling.  She taught procedure well.  No complications.  She was taken to recovery in stable condition.  .  Complications:  None; patient tolerated the procedure well.    Disposition: PACU -  hemodynamically stable.  Condition: stable         Additional Details:     Attending Attestation: I was present and scrubbed for the entire procedure.    Anderson Tenorio  Phone Number: [phone]       Yes

## 2023-10-13 NOTE — ANESTHESIA PROCEDURE NOTES
Peripheral Block    Patient location during procedure: post-op  Start time: 10/13/2023 8:10 AM  End time: 10/13/2023 8:13 AM  Reason for block: at surgeon's request and post-op pain management  Staffing  Performed: attending   Authorized by: José Mckeon MD    Performed by: José Mckeon MD  Preanesthetic Checklist  Completed: patient identified, IV checked, site marked, risks and benefits discussed, surgical consent, monitors and equipment checked, pre-op evaluation and timeout performed   Timeout performed at: 10/13/2023 8:03 AM  Peripheral Block  Prep: alcohol swabs  Patient monitoring: heart rate, cardiac monitor and continuous pulse ox  Block type: interscalene  Laterality: right  Injection technique: single-shot  Guidance: nerve stimulator and ultrasound guided  Needle  Needle type: short-bevel   Needle gauge: 22 G  Needle length: 5 cm  Needle localization: anatomical landmarks, ultrasound guidance and nerve stimulator  Needle insertion depth: 4 cm  Assessment  Injection assessment: negative aspiration for heme, no paresthesia on injection, incremental injection and local visualized surrounding nerve on ultrasound  Paresthesia pain: none  Heart rate change: no  Slow fractionated injection: yes

## 2023-10-13 NOTE — ANESTHESIA PROCEDURE NOTES
Airway  Date/Time: 10/13/2023 8:31 AM  Urgency: elective    Airway not difficult    Staffing  Performed: KISHOR   Authorized by: José Mckeon MD    Performed by: KISHOR Cruz  Patient location during procedure: OR    Indications and Patient Condition  Indications for airway management: anesthesia and airway protection  Spontaneous ventilation: present  Sedation level: deep  Preoxygenated: yes  Patient position: sniffing  Mask difficulty assessment: 1 - vent by mask  No planned trial extubation    Final Airway Details  Final airway type: endotracheal airway      Successful airway: ETT  Cuffed: yes   Successful intubation technique: direct laryngoscopy  Facilitating devices/methods: intubating stylet  Endotracheal tube insertion site: oral  Blade: Kari  Blade size: #3  ETT size (mm): 7.5  Cormack-Lehane Classification: grade I - full view of glottis  Placement verified by: chest auscultation and capnometry   Cuff volume (mL): 7  Measured from: gums  ETT to gums (cm): 23  Number of attempts at approach: 1

## 2023-10-13 NOTE — PERIOPERATIVE NURSING NOTE
0603--Patient ambulated to [facility] unaccompanied but  Shon will be picking her up if she is not admitted overnight. Patient is alert and oriented, says her right shoulder pain is 8 at this time and reports her skin is intact.  Patient states she did her CHG wash for the last 5 days and used listerine last night and this morning because she did not receive the mouthwash.     0645--Patient ambulated to the restroom.    0716--Patient taken to PACU for nerve block.    0720--Spoke to Dr. Mckeon.  STAT CBC order will be placed.    0723--Stat CBC sent.

## 2023-10-13 NOTE — ANESTHESIA POSTPROCEDURE EVALUATION
Patient: Monse Aguilera    Procedure Summary       Date: 10/13/23 Room / Location: [facility] OR 05 / [facility]    Anesthesia Start: 0820 Anesthesia Stop:     Procedure: RIGHT ANATOMIC TOTAL SHOULDER REPLACEMENT (ARTREX (ECLYPSE VS APAX) REVERSE AVIALABLE, REQ NUSRAT, NATASHA, QIAN, HAGOPIAN  *PRE OP BLOCK* (Right: Shoulder) Diagnosis:       Osteoarthritis of right shoulder, unspecified osteoarthritis type      (M19.011)    Surgeons: Anderson Tenorio MD Responsible Provider: José Mckeon MD    Anesthesia Type: general, regional ASA Status: 3            Anesthesia Type: general, regional    Vitals Value Taken Time   /76 10/13/23 1131   Temp 36.4 10/13/23 1131   Pulse 87 10/13/23 1131   Resp 17 10/13/23 1131   SpO2 99 % 10/13/23 1131   Vitals shown include unvalidated device data.    Anesthesia Post Evaluation    Patient location during evaluation: PACU  Patient participation: complete - patient cannot participate  Level of consciousness: awake  Pain management: adequate  Airway patency: patent  Cardiovascular status: acceptable  Respiratory status: acceptable  Hydration status: acceptable        No notable events documented.

## 2023-10-13 NOTE — SIGNIFICANT EVENT
Transfusion Medicine Consultation    Castillo De Anda [id number]   YOB: 1952 Age: 66 year old   Date of Consult: 8/30/2018      Reason for consult: Plateletpheresis            Assessment and Plan:   66 year old male is seen for consultation for initiation of plateletpheresis for thrombocytosis (PLT 1534 on 08/30).  He has a history of a myleoproliferative disorder. The plan is to complete the depletion procedure once, with clinical re-evaluation subsequently.  The patient will require a line placement prior to the procedure.            Chief Complaint:   Transfusion medicine consultation.         History of Present Illness:   Castillo De Anda is a 66 year old male who is seen for consultation for plateletpheresis for thrombocytosis.  His past medical history includes Polycythemia vera.  He is in hospital status post stenting for ST elevation myocardial infarction. He has ongoing thrombocytosis, with PLT 1534 on 08/30 labs. He is currently well.      The patient denies any back pain that would prevent him from tolerating the procedure. The procedure, risks/benefits were discussed with the patient and all of his questions were addressed at that time.             Past Medical History:   Polycythemia vera          Past Surgical History:     Past Surgical History:   Procedure Laterality Date     ANKLE SURGERY Right 04/2018     APPENDECTOMY       CRANIOTOMY Right 2008     ESOPHAGOSCOPY, GASTROSCOPY, DUODENOSCOPY (EGD), COMBINED N/A 8/24/2018    Procedure: COMBINED ESOPHAGOSCOPY, GASTROSCOPY, DUODENOSCOPY (EGD);  Upper Endoscopy with No Intervention; Two Gastric Ulcers;  Surgeon: Rocael Barber MD;  Location: [facility] OR     IRRIGATION AND DEBRIDEMENT CHEST WASHOUT, COMBINED N/A 8/13/2018    Procedure: COMBINED IRRIGATION AND DEBRIDEMENT CHEST WASHOUT;  COMBINED IRRIGATION AND DEBRIDEMENT CHEST WASHOUT, Closure;  Surgeon: Jason Franco MD;  Location: [facility] OR     PROSTATECTOMY PERINEAL  2004      Medicated with Versed 2mg IVP   REPAIR VENTRICULAR SEPTAL DEFECT N/A 8/10/2018    Procedure: REPAIR VENTRICULAR SEPTAL DEFECT;  Median Sternotomy, REPAIR VENTRICULAR SEPTAL DEFECT on pump oxygenation, Tricuspid valve replacement ;  Surgeon: Jason Franco MD;  Location: [facility] OR     SPLENECTOMY      childhood     THYROID SURGERY  2012            Allergies:     Allergies   Allergen Reactions     Anagrelide Rash     Noted to have small rash and nose bleeds after starting the prescription (prior to August 2018 hospitalization; prescribed by [facility] clinician); dosage had then been increased (by [facility] clinician) and subsequently developed full body rash within 4 hours of increased dose.             Medications:     Current Facility-Administered Medications   Medication     acetaminophen (TYLENOL) tablet 650 mg     albuterol neb solution 2.5 mg     amiodarone (PACERONE/CODARONE) tablet 400 mg     atorvastatin (LIPITOR) tablet 40 mg     benzocaine-menthol (CEPACOL) 15-3.6 MG lozenge 1 lozenge     calcium gluconate 1 g in D5W 100 mL intermittent infusion     Carboxymethylcellulose Sod PF (REFRESH PLUS) 0.5 % ophthalmic solution 1 drop     clopidogrel (PLAVIX) tablet 75 mg     glucose gel 15-30 g    Or     dextrose 50 % injection 25-50 mL    Or     glucagon injection 1 mg     furosemide (LASIX) injection 40 mg     heparin  drip 25,000 units in 0.45% NaCl 250 mL (see additional administration details for dose)     heparin bolus from infusion pump     heparin lock flush 10 UNIT/ML injection 2-5 mL     heparin lock flush 10 UNIT/ML injection 5-10 mL     heparin lock flush 10 UNIT/ML injection 5-10 mL     hydrALAZINE (APRESOLINE) injection 10 mg     HYDROmorphone (PF) (DILAUDID) injection 0.3-0.5 mg     hydroxyurea (HYDREA) capsule CHEMO 1,500 mg     insulin aspart (NovoLOG) inj (RAPID ACTING)     levothyroxine (SYNTHROID/LEVOTHROID) tablet 75 mcg     lidocaine (LMX4) cream     lidocaine (LMX4) cream     lidocaine 1 % 1 mL     magnesium sulfate 2 g in  NS intermittent infusion ([facility] or [facility])     magnesium sulfate 4 g in 100 mL sterile water (premade)     May continue current IV fluids if patient has IV fluids infusing.     melatonin tablet 1 mg     methocarbamol (ROBAXIN) tablet 500 mg     metoprolol (LOPRESSOR) injection 2.5 mg     multivitamin, therapeutic (THERA-VIT) tablet 1 tablet     naloxone (NARCAN) injection 0.1-0.4 mg     ondansetron (ZOFRAN-ODT) ODT tab 4 mg    Or     ondansetron (ZOFRAN) injection 4 mg     oxyCODONE IR (ROXICODONE) tablet 5-10 mg     pantoprazole (PROTONIX) EC tablet 40 mg     polyethylene glycol (MIRALAX/GLYCOLAX) Packet 17 g     potassium chloride (KLOR-CON) Packet 20-40 mEq     potassium chloride 10 mEq in 100 mL intermittent infusion with 10 mg lidocaine     potassium chloride 10 mEq in 100 mL sterile water intermittent infusion (premix)     potassium chloride 20 mEq in 50 mL intermittent infusion     potassium chloride SA (K-DUR/KLOR-CON M) CR tablet 20 mEq     potassium chloride SA (K-DUR/KLOR-CON M) CR tablet 20-40 mEq     potassium phosphate 15 mmol in D5W 250 mL intermittent infusion     potassium phosphate 20 mmol in D5W 250 mL intermittent infusion     potassium phosphate 20 mmol in D5W 500 mL intermittent infusion     potassium phosphate 25 mmol in D5W 500 mL intermittent infusion     prochlorperazine (COMPAZINE) injection 5 mg    Or     prochlorperazine (COMPAZINE) tablet 5 mg     QUEtiapine (SEROquel) half-tab 12.5 mg     QUEtiapine (SEROquel) half-tab 12.5 mg     Reason ACE/ARB/ARNI order not selected     Reason beta blocker not prescribed     Reason beta blocker order not selected     senna-docusate (SENOKOT-S;PERICOLACE) 8.6-50 MG per tablet 2 tablet    Or     senna-docusate (SENOKOT-S;PERICOLACE) 8.6-50 MG per tablet 1 tablet     sodium chloride (PF) 0.9% PF flush 10-20 mL     sodium chloride (PF) 0.9% PF flush 3 mL     sodium chloride (PF) 0.9% PF flush 3 mL     sodium chloride (PF) 0.9% PF flush 3 mL      "sodium chloride (PF) 0.9% PF flush 5-50 mL     Warfarin Therapy Reminder (Check START DATE - warfarin may be starting in the FUTURE)             Review of Systems:   CONSTITUTIONAL: NEGATIVE for fever, chills  HENT: NEGATIVE for hearing or vision changes  RESP: NEGATIVE for significant cough or SOB  CV: NEGATIVE for significant chest pain  MUSCULOSKELETAL: NEGATIVE for significant arthralgias or myalgia  NEURO: NEGATIVE for weakness, dizziness or paresthesias           Vital Signs:   /72 (BP Location: Left arm)  Pulse 88  Temp 97.7  F (36.5  C) (Oral)  Resp 20  Ht 1.753 m (5' 9\")  Wt 77 kg (169 lb 12.8 oz)  SpO2 98%  BMI 25.08 kg/m2              Data:     CBC RESULTS:   Recent Labs   Lab Test  08/30/18   0652   WBC  8.0   RBC  2.99*   HGB  9.2*   HCT  29.8*   MCV  100   MCH  30.8   MCHC  30.9*   RDW  23.2*   PLT  1534*       Vineet Urbano DO  Blood Bank and Transfusion Medicine Fellow  Transfusion Medicine Service  Pager [phone]      Attestation:  I, Jason Fowler MD, saw and evaluated this patient following their visit with the transfusion medicine fellow, Vineet Urbano DO. I have reviewed the patient's records and data.  I have edited this note and it reflects our medical assessment.  Please see my procedure note from 8/30 for more details regarding tonight's plateletpheresis procedure.     Jason Fowler MD  Transfusion Medicine Attending  Laboratory Medicine and Pathology  Pager [phone]          "

## 2023-10-13 NOTE — PERIOPERATIVE NURSING NOTE
1256--Patient returned to [place] [facility] from PACU. Patient is alert and oriented, denies any pain at this time due to nerve block and is drinking Coke per request.  Patient has one long dressing to right shoulder that is dry and intact with a sling and ice pack applied.  Patient is continuing to use her Incentive Spirometer.      1308--Discharge, dressing and medication instructions being given by CANDY Mckinney RN.  No questions at this time.    1320--Spoke to patient's  Shon and he said he will be here in 18 minutes to pick her up.    1325--Patient getting dressed with assistance.    1343--Patient wheeled down to lobby.

## 2023-10-13 NOTE — PERIOPERATIVE NURSING NOTE
SDS RN aware of patient pulse ox. PACU RN encouraged patient to deep breathe, use IS and gave albuterol treatment as well. SDS RN and PACU RN agreed that the patient needed to sit up and ambulate to increase pulse ox reading.

## 2023-10-13 NOTE — ANESTHESIA PREPROCEDURE EVALUATION
Patient: Monse Aguilera    Procedure Information       Date/Time: 10/13/23 8730    Procedure: RIGHT ANATOMIC TOTAL SHOULDER REPLACEMENT (ARTREX (ECLYPSE VS APAX) REVERSE AVIALABLE, REQ NUSRAT, NATASHA, QIAN, HAGOPIAN  *PRE OP BLOCK* (Right: Shoulder)    Location: [facility] / [facility]    Surgeons: Anderson Tenorio MD            Relevant Problems   Cardiovascular   (+) Electrocardiogram abnormal   (+) Essential hypertension   (+) Hyperlipidemia   (+) Long QT syndrome      Endocrine   (+) Hypothyroidism      /Renal   (+) Calculus of kidney      Neuro/Psych   (+) Depressive disorder      Musculoskeletal   (+) Osteoarthritis of right shoulder      Other   (+) Arthritis       Clinical information reviewed:    Allergies  Meds             Past Surgical History:  No date: BUNIONECTOMY  No date: CARDIAC DEFIBRILLATOR PLACEMENT; Left  No date: JOINT REPLACEMENT  No date: LUMBAR FUSION  No date: LUMBAR LAMINECTOMY  No date: OOPHORECTOMY   NPO Detail:  NPO/Void Status  Carbonhydrate Drink Given Prior to Surgery? : N  Date of Last Liquid: 10/12/23  Time of Last Liquid: 2330  Date of Last Solid: 10/12/23  Time of Last Solid: 2330  Last Intake Type: Light meal; Clear fluids  Time of Last Void: 0500         Physical Exam    Airway  Mallampati: II  TM distance: >3 FB  Neck ROM: full     Cardiovascular   Comments: deferred   Dental    Pulmonary   Comments: deferred   Abdominal     Comments: deferred           Anesthesia Plan    ASA 3     general and regional     The patient is a current smoker.  Patient was not previously instructed to abstain from smoking on day of procedure.  Patient did not smoke on day of procedure.  Education provided regarding risk of obstructive sleep apnea.  Anesthetic plan and risks discussed with patient.  Use of blood products discussed with patient who consented to blood products.    Plan discussed with CAA.

## 2023-10-16 ASSESSMENT — PAIN SCALES - GENERAL: PAINLEVEL_OUTOF10: 2

## 2023-10-24 ENCOUNTER — HOSPITAL ENCOUNTER (OUTPATIENT)
Dept: DATA CONVERSION | Facility: HOSPITAL | Age: 63
Discharge: HOME | End: 2023-10-24

## 2023-10-24 ENCOUNTER — EVALUATION (OUTPATIENT)
Dept: PHYSICAL THERAPY | Facility: CLINIC | Age: 63
End: 2023-10-24
Payer: MEDICARE

## 2023-10-24 DIAGNOSIS — M25.511 RIGHT SHOULDER PAIN: Primary | ICD-10-CM

## 2023-10-24 PROCEDURE — 97161 PT EVAL LOW COMPLEX 20 MIN: CPT | Mod: GP

## 2023-10-24 PROCEDURE — 97140 MANUAL THERAPY 1/> REGIONS: CPT | Mod: GP

## 2023-10-24 PROCEDURE — 97110 THERAPEUTIC EXERCISES: CPT | Mod: GP

## 2023-10-24 ASSESSMENT — ENCOUNTER SYMPTOMS
OCCASIONAL FEELINGS OF UNSTEADINESS: 0
LOSS OF SENSATION IN FEET: 0
DEPRESSION: 0

## 2023-10-24 NOTE — Clinical Note
October 24, 2023     Patient: Monse Aguilera   YOB: 1960   Date of Visit: 10/24/2023       To Whom it May Concern:    Monse Aguilera was seen in my clinic on 10/24/2023. She {Return to school/sport:75108}.    If you have any questions or concerns, please don't hesitate to call.         Sincerely,          Keila Gray, PT        CC: No Recipients

## 2023-10-24 NOTE — PROGRESS NOTES
Physical Therapy    Physical Therapy Evaluation    Patient Name: Monse Aguilera  MRN: [id number]  Today's Date: 10/24/2023       Assessment  PT Assessment Results: Decreased strength, Decreased range of motion, Decreased endurance, Decreased mobility, Decreased coordination, Orthopedic restrictions, Pain  Rehab Prognosis: Good  Evaluation/Treatment Tolerance: Patient tolerated treatment wellpt is a 63 year old F who participated in a physical therapy evaluation today due to R Shoulder TSA 10/13. her impairments include; tenderness, strength, pain, swelling, ROM deficits. Due to these impairments, pt has the following functional limitations difficulty with sleeping, R UE use, performing household/recreational/ occupational activities, and performing ADLs. Pt will benefit from continued skilled physical therapy to address above impairments and progress toward therapy related goals.       Plan  Treatment/Interventions: Education/ Instruction, Electrical stimulation, Hot pack, Manual therapy, Neuromuscular re-education, Therapeutic activities, Therapeutic exercises  PT Plan: Skilled PT  PT Frequency: 2 times per week  Duration: 4 weeks  Onset Date: 10/13/23  Certification Period Start Date: 10/24/23  Certification Period End Date: 01/22/24  Number of Treatments Authorized: MN  Rehab Potential: Good  Plan of Care Agreement: Patient    Plan Next visit; Review HEP, PROM in scaption and ER ONLY.   Please refer to: [facility] rehab protocol for total shoulder arthroplasty for further direction ( protocol in patients chart)    Current Problem  1. Right shoulder pain  Follow Up In Physical Therapy          Subjective   RFV: R shoulder TSA, will follow up with Dr. Tenorio today   DOS:10/13/23  Pain:0/10 at rest, pt in sling   Description of pain: No pain at this time   Precautions: Standard TSA precautions   Acute/Chronic: Acute  Functional limitations: R UE use, pt is R arm dominant   Medication/injections: aleeve, tyelnol    PMH:B/L Knee replacements, spinal fusion          Objective               Shoulder         Shoulder Observation  Cervical Posture: forward head, rounded shoulders       Cervical AROM WFL unless documented below  Cervical AROM WFL: yes  Shoulder AROM WFL unless documented below  Shoulder AROM WFL: no (PROM in scaption and ER ONLY)  Shoulder PROM WFL unless documented below  Shoulder PROM WFL: yes (scaption and ER only)  R shoulder flexion: (180°): 110 ( scaption)  R shoulder ER: (90°): <30  Cervical Myotomes (MMT) WFL unless documented below  Cervical Myotomes (MMT) WFL: no  Shoulder Strength WFL unless documented below  Shoulder Strength WFL: no  Scapular MMT WFL unless documented below     DTR WFL unless documented below     Shoulder Special Tests Negative unless documented below       Outcome Measures:  NDI: 110/130 ( assessed by therapist)    OP EDUCATION:  Education  Individual(s) Educated: Patient  Education Provided: Anatomy, Home Exercise Program, Physiology  Risk and Benefits Discussed with Patient/Caregiver/Other: yes  Patient/Caregiver Demonstrated Understanding: yes  Plan of Care Discussed and Agreed Upon: yes  Patient Response to Education: Patient/Caregiver Verbalized Understanding of Information, Patient/Caregiver Performed Return Demonstration of Exercises/Activities, Patient/Caregiver Asked Appropriate Questions      Treatment performed at eval:  Manual: PROM of shoulder scaption, and ER - 10 minutes  Therex see below for HEP: 10 minutes    Pendulum CW/CCW: 2 x 20  Scap retraction: 10x5 s holds  Wrist flexion + wrist extension 2 x 20  Ball squeeze 2 x 20    Goals:  Active       PT Problem       Pt will demonstrate 4/5 B/L UE strength        Start:  10/24/23    Expected End:  01/22/24            Pt will demonstrate >120 deg of R shoulder AROM       Start:  10/24/23    Expected End:  01/22/24            Pt will be indep with HEP         Start:  10/24/23    Expected End:  01/22/24            pt will  report no greater than 2/10 R shoulder pain       Start:  10/24/23    Expected End:  01/22/24

## 2023-10-24 NOTE — Clinical Note
October 24, 2023     Patient: Monse Aguilera   YOB: 1960   Date of Visit: 10/24/2023       To Whom It May Concern:    It is my medical opinion that Monse Aguilera {Work release (duty restriction):91661}.    If you have any questions or concerns, please don't hesitate to call.         Sincerely,        Keila Gray, PT    CC: No Recipients

## 2023-11-01 ENCOUNTER — TREATMENT (OUTPATIENT)
Dept: PHYSICAL THERAPY | Facility: CLINIC | Age: 63
End: 2023-11-01
Payer: MEDICARE

## 2023-11-01 DIAGNOSIS — M25.511 RIGHT SHOULDER PAIN: ICD-10-CM

## 2023-11-01 PROCEDURE — 97110 THERAPEUTIC EXERCISES: CPT | Mod: GP

## 2023-11-01 PROCEDURE — 97140 MANUAL THERAPY 1/> REGIONS: CPT | Mod: GP

## 2023-11-01 NOTE — PROGRESS NOTES
Physical Therapy    Physical Therapy Treatment    Patient Name: Monse Aguilera  MRN: [id number]  Today's Date: 11/1/2023     Visit # 2/8    Current Problem   1. Right shoulder pain  Follow Up In Physical Therapy          Subjective   Pt reports she will be following up with Dr. Tenorio in the next few weeks     Objective   Findings: pain 2/10  Pain after session 2/10     Treatments:  Therapeutic Exercise  Therapeutic Exercise Performed: Yes  Therapeutic Exercise Activity 1: Scap squeezes  Therapeutic Exercise Activity 2: Ball squeeze 2 x20  Therapeutic Exercise Activity 3: wrist flexion 2 x 20  Therapeutic Exercise Activity 4: wrist extension 2 x 20  Therapeutic Exercise Activity 5: seated bicep curl    Manual Therapy  Manual Therapy Performed: Yes  Manual Therapy Activity 1: Manual PROM shoulder flexion, ER  Manual Therapy Activity 2: Light joint osscilations  Manual Therapy Activity 3: Manual UT stretch 2 x 30 s       Assessment   Assessment:    Emphasis of todays treatment was to focus on gentle PROM per protocol instructions, and improving general pain free mobility. Pt will cont to progress with skilled PT to continue to address above impairments       Plan:    Cont to follow protocol     OP EDUCATION:   HEP     Goals:   Active       PT Problem       Pt will demonstrate 4/5 B/L UE strength  (Progressing)       Start:  10/24/23    Expected End:  01/22/24            Pt will demonstrate >120 deg of R shoulder AROM (Progressing)       Start:  10/24/23    Expected End:  01/22/24            Pt will be indep with HEP   (Progressing)       Start:  10/24/23    Expected End:  01/22/24            pt will report no greater than 2/10 R shoulder pain (Progressing)       Start:  10/24/23    Expected End:  01/22/24

## 2023-11-03 ENCOUNTER — APPOINTMENT (OUTPATIENT)
Dept: PHYSICAL THERAPY | Facility: CLINIC | Age: 63
End: 2023-11-03
Payer: MEDICARE

## 2023-11-06 ENCOUNTER — APPOINTMENT (OUTPATIENT)
Dept: PHYSICAL THERAPY | Facility: CLINIC | Age: 63
End: 2023-11-06
Payer: MEDICARE

## 2023-11-08 ENCOUNTER — TREATMENT (OUTPATIENT)
Dept: PHYSICAL THERAPY | Facility: CLINIC | Age: 63
End: 2023-11-08
Payer: MEDICARE

## 2023-11-08 DIAGNOSIS — M25.511 ACUTE PAIN OF RIGHT SHOULDER: Primary | ICD-10-CM

## 2023-11-08 DIAGNOSIS — M25.511 RIGHT SHOULDER PAIN: ICD-10-CM

## 2023-11-08 PROCEDURE — 97110 THERAPEUTIC EXERCISES: CPT | Mod: GP,CQ

## 2023-11-08 PROCEDURE — 97140 MANUAL THERAPY 1/> REGIONS: CPT | Mod: GP,CQ

## 2023-11-08 NOTE — PROGRESS NOTES
Physical Therapy    Physical Therapy Treatment    Patient Name: Monse Aguilera  MRN: [id number]  Today's Date: 11/8/2023  Time Calculation  Start Time: 0850  Stop Time: 0930  Time Calculation (min): 40 min  PT Therapeutic Procedures Time Entry  Manual Therapy Time Entry: 10  Therapeutic Exercise Time Entry: 30  Visit # 3/8    Current Problem   1. Acute pain of right shoulder        2. Right shoulder pain  Follow Up In Physical Therapy            Subjective   Pt reports she will be following up with Dr. Tenorio in the next few weeks     Objective   Findings: pain 1/10  Pain after session 1/10     R GH flexion to 90  ER to 30  Treatments:   Pulleys for scaption  Wrist/elbow flexion/extension with light weight  Scap retractions GTB x 30  Self assist GH flexion  SPO with self assist     PROM for flexion/scaption and ER       Assessment   Assessment:    Emphasis of todays treatment was to focus on gentle PROM per protocol . Added AAROM tasks to HEP      Plan:    Cont to follow protocol     OP EDUCATION:   HEP     Goals:   Active       PT Problem       Pt will demonstrate 4/5 B/L UE strength  (Progressing)       Start:  10/24/23    Expected End:  01/22/24            Pt will demonstrate >120 deg of R shoulder AROM (Progressing)       Start:  10/24/23    Expected End:  01/22/24            Pt will be indep with HEP   (Progressing)       Start:  10/24/23    Expected End:  01/22/24            pt will report no greater than 2/10 R shoulder pain (Progressing)       Start:  10/24/23    Expected End:  01/22/24

## 2023-11-13 ENCOUNTER — APPOINTMENT (OUTPATIENT)
Dept: PHYSICAL THERAPY | Facility: CLINIC | Age: 63
End: 2023-11-13
Payer: MEDICARE

## 2023-11-15 ENCOUNTER — TREATMENT (OUTPATIENT)
Dept: PHYSICAL THERAPY | Facility: CLINIC | Age: 63
End: 2023-11-15
Payer: MEDICARE

## 2023-11-15 DIAGNOSIS — M25.511 ACUTE PAIN OF RIGHT SHOULDER: Primary | ICD-10-CM

## 2023-11-15 DIAGNOSIS — M25.511 RIGHT SHOULDER PAIN: ICD-10-CM

## 2023-11-15 PROCEDURE — 97140 MANUAL THERAPY 1/> REGIONS: CPT | Mod: GP,CQ

## 2023-11-15 PROCEDURE — 97110 THERAPEUTIC EXERCISES: CPT | Mod: GP,CQ

## 2023-11-15 NOTE — PROGRESS NOTES
Physical Therapy Treatment    Patient Name: Monse Aguilera  MRN: [id number]  Today's Date: 11/15/2023  Time Calculation  Start Time: 0845  Stop Time: 0930  Time Calculation (min): 45 min  PT Therapeutic Procedures Time Entry  Manual Therapy Time Entry: 10  Therapeutic Exercise Time Entry: 35  Visit # 4/8      Subjective   General    Pt reports some general soreness. Pt educated in not using arm for ADL's yet.  Precautions:   R TSR on 10/13  Pain    3/10   Post Treatment Pain Level 3/10    Objective   R shoulder AAROM 121    Treatments:  UBE no resistance  Pulleys for scaption  Wrist/elbow flexion/extension with light weight...HEP  Scap retractions GTB x 30  Self assist GH flexion  SPO with self assist  GH isometrics     PROM for flexion/scaption and ER           Assessment   Assessment:    Began light strengthening today. Pt needed a good amount of cueing for correct form    Plan:    Progress per protocol    OP EDUCATION:       Goals:   Active       PT Problem       Pt will demonstrate 4/5 B/L UE strength  (Progressing)       Start:  10/24/23    Expected End:  01/22/24            Pt will demonstrate >120 deg of R shoulder AROM (Progressing)       Start:  10/24/23    Expected End:  01/22/24            Pt will be indep with HEP   (Progressing)       Start:  10/24/23    Expected End:  01/22/24            pt will report no greater than 2/10 R shoulder pain (Progressing)       Start:  10/24/23    Expected End:  01/22/24

## 2023-11-20 ENCOUNTER — APPOINTMENT (OUTPATIENT)
Dept: PHYSICAL THERAPY | Facility: CLINIC | Age: 63
End: 2023-11-20
Payer: MEDICARE

## 2023-11-22 ENCOUNTER — TREATMENT (OUTPATIENT)
Dept: PHYSICAL THERAPY | Facility: CLINIC | Age: 63
End: 2023-11-22
Payer: MEDICARE

## 2023-11-22 DIAGNOSIS — M25.511 RIGHT SHOULDER PAIN: ICD-10-CM

## 2023-11-22 PROCEDURE — 97140 MANUAL THERAPY 1/> REGIONS: CPT | Mod: GP

## 2023-11-23 NOTE — PROGRESS NOTES
Physical Therapy    Physical Therapy Treatment    Patient Name: Monse Aguilera  MRN: [id number]  Today's Date: 11/22/2023         Assessment:   Emphasis of todays treatment was to focus on manual, isometrics, and rythmic stabilization. Pt will cont to progress with skilled PT to continue to address above impairments     Plan:   Progress per Protocol    Current Problem  1. Right shoulder pain  Follow Up In Physical Therapy          General          Subjective    Pt had a follow up with MD, everything is coming along well       Objective   Shoulder elevation >120 deg    Treatments:  Manual Therapy  Manual Therapy Performed: Yes  Manual Therapy Activity 1: Manual PROM of shoulder flexion, ER  Manual Therapy Activity 2: Rhytmic stabilization flexion 3 x 30s  Manual Therapy Activity 3: Manual isometrics ER, Flexion 3 x 30s  Manual Therapy Activity 4: Pulley AAROM x 5 mins  Manual Therapy Activity 5: UBE x 5 mins    OP EDUCATION:       Goals:  Active       PT Problem       Pt will demonstrate 4/5 B/L UE strength  (Progressing)       Start:  10/24/23    Expected End:  01/22/24            Pt will demonstrate >120 deg of R shoulder AROM (Progressing)       Start:  10/24/23    Expected End:  01/22/24            Pt will be indep with HEP   (Progressing)       Start:  10/24/23    Expected End:  01/22/24            pt will report no greater than 2/10 R shoulder pain (Progressing)       Start:  10/24/23    Expected End:  01/22/24

## 2023-11-29 ENCOUNTER — TREATMENT (OUTPATIENT)
Dept: PHYSICAL THERAPY | Facility: CLINIC | Age: 63
End: 2023-11-29
Payer: MEDICARE

## 2023-11-29 DIAGNOSIS — M25.511 RIGHT SHOULDER PAIN: Primary | ICD-10-CM

## 2023-11-29 PROCEDURE — 97110 THERAPEUTIC EXERCISES: CPT | Mod: GP

## 2023-11-30 NOTE — PROGRESS NOTES
Physical Therapy    Physical Therapy Treatment    Patient Name: Monse Aguilera  MRN: [id number]  Today's Date: 11/30/2023         Assessment:   Pt presents to the clinic today for a formal reassessment. Pt is demonstrating significant improvement with respect to strength, AROM, and tolerance to activity. Pt has no pain or functional limitations and this time and has successfully met all of her therapeutic goals. Pt will be D.C from PT at this time.    Plan:   DC PT    Current Problem  No diagnosis found.    General          Subjective    Pt reports significant improvement with respect to R Shoulder symptoms, complaining mostly of Hip pain at this time    Objective   >120 degrees of shoulder elevation, no pain > 3/5 strength       Treatments:  Manual:   STM R UT  IASTM R UT     Therex:   Side lying ER 2 x 10  Scap plane ER walk outs 2 x 10  Rows 2 x 10  Straight arm pull downs 2 x 10    OP EDUCATION:       Goals:  Active       PT Problem       Pt will demonstrate 4/5 B/L UE strength  (Progressing)       Start:  10/24/23    Expected End:  01/22/24            Pt will demonstrate >120 deg of R shoulder AROM (Progressing)       Start:  10/24/23    Expected End:  01/22/24            Pt will be indep with HEP   (Progressing)       Start:  10/24/23    Expected End:  01/22/24            pt will report no greater than 2/10 R shoulder pain (Progressing)       Start:  10/24/23    Expected End:  01/22/24

## 2023-12-07 ENCOUNTER — HOSPITAL ENCOUNTER (OUTPATIENT)
Dept: RADIOLOGY | Facility: HOSPITAL | Age: 63
Discharge: HOME | End: 2023-12-07
Payer: MEDICARE

## 2023-12-07 ENCOUNTER — APPOINTMENT (OUTPATIENT)
Dept: PRIMARY CARE | Facility: CLINIC | Age: 63
End: 2023-12-07
Payer: MEDICARE

## 2023-12-07 DIAGNOSIS — M16.11 UNILATERAL PRIMARY OSTEOARTHRITIS, RIGHT HIP: ICD-10-CM

## 2023-12-07 PROCEDURE — 77002 NEEDLE LOCALIZATION BY XRAY: CPT | Mod: RT

## 2023-12-07 PROCEDURE — 2550000001 HC RX 255 CONTRASTS: Performed by: ORTHOPAEDIC SURGERY

## 2023-12-07 PROCEDURE — 2500000004 HC RX 250 GENERAL PHARMACY W/ HCPCS (ALT 636 FOR OP/ED): Performed by: ORTHOPAEDIC SURGERY

## 2023-12-07 PROCEDURE — 2500000005 HC RX 250 GENERAL PHARMACY W/O HCPCS: Performed by: ORTHOPAEDIC SURGERY

## 2023-12-07 PROCEDURE — 96372 THER/PROPH/DIAG INJ SC/IM: CPT | Performed by: ORTHOPAEDIC SURGERY

## 2023-12-07 RX ORDER — LIDOCAINE HYDROCHLORIDE 10 MG/ML
5 INJECTION, SOLUTION EPIDURAL; INFILTRATION; INTRACAUDAL; PERINEURAL ONCE
Status: COMPLETED | OUTPATIENT
Start: 2023-12-07 | End: 2023-12-07

## 2023-12-07 RX ORDER — METHYLPREDNISOLONE ACETATE 40 MG/ML
40 INJECTION, SUSPENSION INTRA-ARTICULAR; INTRALESIONAL; INTRAMUSCULAR; SOFT TISSUE ONCE
Status: COMPLETED | OUTPATIENT
Start: 2023-12-07 | End: 2023-12-07

## 2023-12-07 RX ADMIN — METHYLPREDNISOLONE ACETATE 40 MG: 40 INJECTION, SUSPENSION INTRA-ARTICULAR; INTRALESIONAL; INTRAMUSCULAR; INTRASYNOVIAL; SOFT TISSUE at 10:22

## 2023-12-07 RX ADMIN — LIDOCAINE HYDROCHLORIDE 5 ML: 10 INJECTION, SOLUTION EPIDURAL; INFILTRATION; INTRACAUDAL; PERINEURAL at 09:30

## 2023-12-07 RX ADMIN — IOHEXOL 3 ML: 240 INJECTION, SOLUTION INTRATHECAL; INTRAVASCULAR; INTRAVENOUS; ORAL at 10:23

## 2023-12-07 RX ADMIN — LIDOCAINE HYDROCHLORIDE 3 ML: 10 INJECTION, SOLUTION EPIDURAL; INFILTRATION; INTRACAUDAL; PERINEURAL at 09:30

## 2024-01-09 ENCOUNTER — OFFICE VISIT (OUTPATIENT)
Dept: PRIMARY CARE | Facility: CLINIC | Age: 64
End: 2024-01-09
Payer: MEDICARE

## 2024-01-09 VITALS
WEIGHT: 221.6 LBS | HEART RATE: 97 BPM | HEIGHT: 65 IN | BODY MASS INDEX: 36.92 KG/M2 | DIASTOLIC BLOOD PRESSURE: 77 MMHG | SYSTOLIC BLOOD PRESSURE: 120 MMHG | RESPIRATION RATE: 16 BRPM | OXYGEN SATURATION: 96 %

## 2024-01-09 DIAGNOSIS — I10 ESSENTIAL HYPERTENSION: ICD-10-CM

## 2024-01-09 DIAGNOSIS — E78.2 MIXED HYPERLIPIDEMIA: Primary | ICD-10-CM

## 2024-01-09 DIAGNOSIS — Z23 ENCOUNTER FOR IMMUNIZATION: ICD-10-CM

## 2024-01-09 DIAGNOSIS — G43.E19 INTRACTABLE CHRONIC MIGRAINE WITH AURA AND WITHOUT STATUS MIGRAINOSUS: ICD-10-CM

## 2024-01-09 DIAGNOSIS — E03.9 ACQUIRED HYPOTHYROIDISM: ICD-10-CM

## 2024-01-09 DIAGNOSIS — Z71.6 ENCOUNTER FOR SMOKING CESSATION COUNSELING: ICD-10-CM

## 2024-01-09 DIAGNOSIS — F32.A DEPRESSIVE DISORDER: ICD-10-CM

## 2024-01-09 PROCEDURE — 3078F DIAST BP <80 MM HG: CPT

## 2024-01-09 PROCEDURE — 99497 ADVNCD CARE PLAN 30 MIN: CPT

## 2024-01-09 PROCEDURE — 99214 OFFICE O/P EST MOD 30 MIN: CPT

## 2024-01-09 PROCEDURE — 3074F SYST BP LT 130 MM HG: CPT

## 2024-01-09 PROCEDURE — 4004F PT TOBACCO SCREEN RCVD TLK: CPT

## 2024-01-09 PROCEDURE — 90677 PCV20 VACCINE IM: CPT | Mod: MUE

## 2024-01-09 PROCEDURE — 1159F MED LIST DOCD IN RCRD: CPT

## 2024-01-09 PROCEDURE — 90677 PCV20 VACCINE IM: CPT

## 2024-01-09 PROCEDURE — 90471 IMMUNIZATION ADMIN: CPT

## 2024-01-09 PROCEDURE — 1125F AMNT PAIN NOTED PAIN PRSNT: CPT

## 2024-01-09 RX ORDER — BUPROPION HYDROCHLORIDE 300 MG/1
300 TABLET ORAL EVERY MORNING
Qty: 90 TABLET | Refills: 1 | Status: SHIPPED | OUTPATIENT
Start: 2024-01-09 | End: 2024-01-16 | Stop reason: SDUPTHER

## 2024-01-09 RX ORDER — VARENICLINE TARTRATE 1 MG/1
0.5 TABLET, FILM COATED ORAL 2 TIMES DAILY
Qty: 180 TABLET | Refills: 0 | Status: SHIPPED | OUTPATIENT
Start: 2024-01-09 | End: 2024-01-11

## 2024-01-09 RX ORDER — LEVOTHYROXINE SODIUM 175 UG/1
175 TABLET ORAL
Qty: 90 TABLET | Refills: 1 | Status: SHIPPED | OUTPATIENT
Start: 2024-01-09

## 2024-01-09 RX ORDER — LEVOTHYROXINE SODIUM 175 UG/1
175 TABLET ORAL
COMMUNITY
End: 2024-01-09 | Stop reason: SDUPTHER

## 2024-01-09 RX ORDER — ATORVASTATIN CALCIUM 40 MG/1
40 TABLET, FILM COATED ORAL DAILY
Qty: 90 TABLET | Refills: 1 | Status: SHIPPED | OUTPATIENT
Start: 2024-01-09

## 2024-01-09 RX ORDER — SERTRALINE HYDROCHLORIDE 100 MG/1
200 TABLET, FILM COATED ORAL DAILY
Qty: 180 TABLET | Refills: 1 | Status: SHIPPED | OUTPATIENT
Start: 2024-01-09

## 2024-01-09 ASSESSMENT — PAIN SCALES - GENERAL: PAINLEVEL: 5

## 2024-01-09 ASSESSMENT — ENCOUNTER SYMPTOMS
DEPRESSION: 0
OCCASIONAL FEELINGS OF UNSTEADINESS: 0
LOSS OF SENSATION IN FEET: 0

## 2024-01-09 ASSESSMENT — COLUMBIA-SUICIDE SEVERITY RATING SCALE - C-SSRS
1. IN THE PAST MONTH, HAVE YOU WISHED YOU WERE DEAD OR WISHED YOU COULD GO TO SLEEP AND NOT WAKE UP?: NO
6. HAVE YOU EVER DONE ANYTHING, STARTED TO DO ANYTHING, OR PREPARED TO DO ANYTHING TO END YOUR LIFE?: NO
2. HAVE YOU ACTUALLY HAD ANY THOUGHTS OF KILLING YOURSELF?: NO

## 2024-01-09 ASSESSMENT — PATIENT HEALTH QUESTIONNAIRE - PHQ9
SUM OF ALL RESPONSES TO PHQ9 QUESTIONS 1 AND 2: 0
2. FEELING DOWN, DEPRESSED OR HOPELESS: NOT AT ALL
1. LITTLE INTEREST OR PLEASURE IN DOING THINGS: NOT AT ALL

## 2024-01-09 NOTE — PATIENT INSTRUCTIONS
Decrease intake of saturated fats, fast food, sweets.  Increase intake of fresh fruit fresh vegetables and lean meats.  Increase healthy fats seeds, nuts, olive oil instead of butter.  walk 150 minutes/week for heart health.  Decrease intake of processed foods, fast foods, lunch meat, canned soups, canned veggies.  Increase intake of fresh fruits, veggies, and lean meats. Monitor blood pressure at home, keep a log and bring this with you to your next appointment. Call the office if your blood pressure runs 150/90 or higher consistently.

## 2024-01-09 NOTE — PROGRESS NOTES
Subjective   Patient ID: Monse Aguilera is a 63 y.o. female who presents for Establish Care.  Former CN patient.    HPI   Denies any falls, urgent care, ER visits.  Denies any issues with chest pain, chest pressure, shortness of breath, constipation, diarrhea, blood in stool, urinary urgency, frequency, blood in urine, muscle weakness in arms or legs, numbness and tingling in fingers or toes.  She did have right shoulder replacement October 13 with Dr. Taylor.  W for physical June 8 or later.  She does note that she will come once a year she does not come every 6 months.  She did ask if for her chronic pain she would be able to have tramadol or pain medication prescribed discussed that with chronic pain I would refer to pain management.  Patient at this time does not want to go pain management as she does not want to keep going to multiple doctors appointments.  She does note she did quit smoking for about 7 weeks 1 week prior to her surgery and 6 weeks postop.  She has since started up again she smokes about three quarters of a pack per day.  She would like to go back on Chantix as this is what she took in the past for smoking cessation that worked for her.  She is taking over-the-counter famotidine due to taking naproxen twice per day for pain.  Review of Systems  Review of Systems negative except as noted in HPI and Chief complaint.    Current Outpatient Medications:     cholecalciferol (Vitamin D-3) 25 MCG (1000 UT) capsule, Take 1 capsule (25 mcg) by mouth once daily., Disp: , Rfl:     losartan (Cozaar) 100 mg tablet, Take 1 tablet (100 mg) by mouth once daily., Disp: , Rfl:     naproxen sodium (Aleve) 220 mg tablet, Take 2 tablets (440 mg) by mouth 2 times a day with meals., Disp: , Rfl:     atorvastatin (Lipitor) 40 mg tablet, Take 1 tablet (40 mg) by mouth once daily., Disp: 90 tablet, Rfl: 1    buPROPion XL (Wellbutrin XL) 300 mg 24 hr tablet, Take 1 tablet (300 mg) by mouth once daily in the morning.,  "Disp: 90 tablet, Rfl: 1    levothyroxine (Synthroid, Levoxyl) 175 mcg tablet, Take 1 tablet (175 mcg) by mouth once daily in the morning. Take before meals., Disp: 90 tablet, Rfl: 1    propranolol XL (Innopran XL) 120 mg 24 hr capsule, Take 2 capsules (240 mg) by mouth once daily., Disp: 180 capsule, Rfl: 1    sertraline (Zoloft) 100 mg tablet, Take 2 tablets (200 mg) by mouth once daily., Disp: 180 tablet, Rfl: 1    varenicline (Chantix) 1 mg tablet, Take 0.5 tablets (0.5 mg) by mouth 2 times a day. Take with full glass of water. Take 0.5 mg once a day for 3 days then take 0.5 mg twice a day for 11 weeks.  Start medication 1 week before anticipated quit date that you have scheduled., Disp: 180 tablet, Rfl: 0    Objective   /77 (BP Location: Left arm, Patient Position: Sitting, BP Cuff Size: Adult)   Pulse 97   Resp 16   Ht 1.651 m (5' 5\")   Wt 101 kg (221 lb 9.6 oz)   SpO2 96%   BMI 36.88 kg/m²     Physical Exam  Vitals and nursing note reviewed.   Constitutional:       Appearance: Normal appearance.   Cardiovascular:      Rate and Rhythm: Normal rate.   Pulmonary:      Effort: Pulmonary effort is normal.   Musculoskeletal:         General: Normal range of motion.   Neurological:      General: No focal deficit present.      Mental Status: She is alert and oriented to person, place, and time. Mental status is at baseline.   Psychiatric:         Mood and Affect: Mood normal.         Behavior: Behavior normal.         Thought Content: Thought content normal.         Judgment: Judgment normal.       Assessment/Plan   Diagnoses and all orders for this visit:  Mixed hyperlipidemia  -     atorvastatin (Lipitor) 40 mg tablet; Take 1 tablet (40 mg) by mouth once daily.  Depressive disorder  -     buPROPion XL (Wellbutrin XL) 300 mg 24 hr tablet; Take 1 tablet (300 mg) by mouth once daily in the morning.  -     sertraline (Zoloft) 100 mg tablet; Take 2 tablets (200 mg) by mouth once daily.  Intractable chronic " migraine with aura and without status migrainosus  -     propranolol XL (Innopran XL) 120 mg 24 hr capsule; Take 2 capsules (240 mg) by mouth once daily.  Acquired hypothyroidism  -     levothyroxine (Synthroid, Levoxyl) 175 mcg tablet; Take 1 tablet (175 mcg) by mouth once daily in the morning. Take before meals.  Essential hypertension  Encounter for immunization  -     Pneumococcal conjugate vaccine, 20-valent (PREVNAR 20)  Encounter for smoking cessation counseling  -     varenicline (Chantix) 1 mg tablet; Take 0.5 tablets (0.5 mg) by mouth 2 times a day. Take with full glass of water. Take 0.5 mg once a day for 3 days then take 0.5 mg twice a day for 11 weeks.  Start medication 1 week before anticipated quit date that you have scheduled.  Other orders  -     Follow Up In Primary Care - Medicare Annual; Future    Decrease intake of saturated fats, fast food, sweets.  Increase intake of fresh fruit fresh vegetables and lean meats.  Increase healthy fats seeds, nuts, olive oil instead of butter.  walk 150 minutes/week for heart health.  Decrease intake of processed foods, fast foods, lunch meat, canned soups, canned veggies.  Increase intake of fresh fruits, veggies, and lean meats. Monitor blood pressure at home, keep a log and bring this with you to your next appointment. Call the office if your blood pressure runs 150/90 or higher consistently.  Advanced care planning was discussed with Monse Aguilera today. We reviewed code status, Medical Power of , and Living will.   Does not have LW or POA   *This note was dictated using DRAGON speech recognition software and was corrected for spelling or grammatical errors, but despite proofreading several typographical errors might be present that might affect the meaning of the content.*  Genet Gaffney, CNP

## 2024-01-10 ENCOUNTER — TELEPHONE (OUTPATIENT)
Dept: PRIMARY CARE | Facility: CLINIC | Age: 64
End: 2024-01-10
Payer: MEDICARE

## 2024-01-10 NOTE — TELEPHONE ENCOUNTER
Patient called and said that a starter pack has to be called in for the Chantix. If appropriate can you send this in?

## 2024-01-11 ENCOUNTER — TELEPHONE (OUTPATIENT)
Dept: PRIMARY CARE | Facility: CLINIC | Age: 64
End: 2024-01-11
Payer: MEDICARE

## 2024-01-11 DIAGNOSIS — Z71.6 ENCOUNTER FOR SMOKING CESSATION COUNSELING: Primary | ICD-10-CM

## 2024-01-11 DIAGNOSIS — G43.E19 INTRACTABLE CHRONIC MIGRAINE WITH AURA AND WITHOUT STATUS MIGRAINOSUS: Primary | ICD-10-CM

## 2024-01-11 DIAGNOSIS — G43.E19 INTRACTABLE CHRONIC MIGRAINE WITH AURA AND WITHOUT STATUS MIGRAINOSUS: ICD-10-CM

## 2024-01-11 RX ORDER — PROPRANOLOL HYDROCHLORIDE 120 MG/1
240 CAPSULE, EXTENDED RELEASE ORAL DAILY
Qty: 180 CAPSULE | Refills: 1 | Status: SHIPPED | OUTPATIENT
Start: 2024-01-11 | End: 2024-01-16 | Stop reason: SDUPTHER

## 2024-01-11 RX ORDER — PROPRANOLOL HYDROCHLORIDE 40 MG/1
TABLET ORAL
Refills: 1 | OUTPATIENT
Start: 2024-01-11

## 2024-01-11 RX ORDER — VARENICLINE TARTRATE 0.5 (11)-1
0.5 KIT ORAL 2 TIMES DAILY
Qty: 53 EACH | Refills: 0 | Status: SHIPPED | OUTPATIENT
Start: 2024-01-11 | End: 2024-02-19 | Stop reason: ENTERED-IN-ERROR

## 2024-01-11 NOTE — TELEPHONE ENCOUNTER
I called the pharmacy to see what they needed in regards to the Chantix prescription. They said that the mediation can not be cut in half and they do make 0.5 mg tablets. There was also two different directions on the first prescription so only one set of directions should be sent.

## 2024-01-15 DIAGNOSIS — G43.E19 INTRACTABLE CHRONIC MIGRAINE WITH AURA AND WITHOUT STATUS MIGRAINOSUS: ICD-10-CM

## 2024-01-15 DIAGNOSIS — F32.A DEPRESSIVE DISORDER: ICD-10-CM

## 2024-01-15 RX ORDER — BUPROPION HYDROCHLORIDE 300 MG/1
300 TABLET ORAL EVERY MORNING
Qty: 90 TABLET | Refills: 1 | OUTPATIENT
Start: 2024-01-15 | End: 2024-07-13

## 2024-01-15 NOTE — TELEPHONE ENCOUNTER
Please send Verenidline RX to [facility] - 1MG tablet 2 times daily in order for her to use [facility].

## 2024-01-16 DIAGNOSIS — F32.A DEPRESSIVE DISORDER: ICD-10-CM

## 2024-01-16 DIAGNOSIS — G43.E19 INTRACTABLE CHRONIC MIGRAINE WITH AURA AND WITHOUT STATUS MIGRAINOSUS: ICD-10-CM

## 2024-01-16 RX ORDER — BUPROPION HYDROCHLORIDE 300 MG/1
300 TABLET ORAL EVERY MORNING
Qty: 90 TABLET | Refills: 1 | Status: SHIPPED | OUTPATIENT
Start: 2024-01-16 | End: 2024-05-14 | Stop reason: SDUPTHER

## 2024-01-16 RX ORDER — PROPRANOLOL HYDROCHLORIDE 120 MG/1
240 CAPSULE, EXTENDED RELEASE ORAL DAILY
Qty: 180 CAPSULE | Refills: 1 | Status: SHIPPED | OUTPATIENT
Start: 2024-01-16 | End: 2024-05-14 | Stop reason: SDUPTHER

## 2024-01-31 ENCOUNTER — HOSPITAL ENCOUNTER (OUTPATIENT)
Dept: RADIOLOGY | Facility: HOSPITAL | Age: 64
Discharge: HOME | End: 2024-01-31
Payer: MEDICARE

## 2024-01-31 DIAGNOSIS — M19.012 PRIMARY OSTEOARTHRITIS, LEFT SHOULDER: ICD-10-CM

## 2024-01-31 PROCEDURE — 73200 CT UPPER EXTREMITY W/O DYE: CPT | Mod: LT

## 2024-02-12 ENCOUNTER — DOCUMENTATION (OUTPATIENT)
Dept: PHYSICAL THERAPY | Facility: CLINIC | Age: 64
End: 2024-02-12
Payer: MEDICARE

## 2024-02-12 NOTE — PROGRESS NOTES
Physical Therapy    Discharge Summary    Name: Monse Aguilera  MRN: [id number]  : 1960  Date: 24    Discharge Summary: PT    Discharge Information: Date of discharge 24      Rehab Discharge Reason: Achieved all and/or the most significant goals(s)

## 2024-02-14 ENCOUNTER — PATIENT MESSAGE (OUTPATIENT)
Dept: PRIMARY CARE | Facility: CLINIC | Age: 64
End: 2024-02-14
Payer: MEDICARE

## 2024-02-14 DIAGNOSIS — Z71.6 ENCOUNTER FOR SMOKING CESSATION COUNSELING: Primary | ICD-10-CM

## 2024-02-15 DIAGNOSIS — Z71.89 HISTORY OF PARTICIPATION IN SMOKING CESSATION COUNSELING: Primary | ICD-10-CM

## 2024-02-15 RX ORDER — VARENICLINE TARTRATE 0.5 (11)-1
1 KIT ORAL 2 TIMES DAILY
Qty: 60 EACH | Refills: 2 | Status: SHIPPED | OUTPATIENT
Start: 2024-02-15 | End: 2024-02-19 | Stop reason: ENTERED-IN-ERROR

## 2024-02-19 ENCOUNTER — HOSPITAL ENCOUNTER (OUTPATIENT)
Dept: RADIOLOGY | Facility: HOSPITAL | Age: 64
Discharge: HOME | End: 2024-02-19
Payer: MEDICARE

## 2024-02-19 ENCOUNTER — TELEPHONE (OUTPATIENT)
Dept: PRIMARY CARE | Facility: CLINIC | Age: 64
End: 2024-02-19
Payer: MEDICARE

## 2024-02-19 DIAGNOSIS — F17.200 SMOKING ADDICTION: Primary | ICD-10-CM

## 2024-02-19 DIAGNOSIS — M16.11 UNILATERAL PRIMARY OSTEOARTHRITIS, RIGHT HIP: ICD-10-CM

## 2024-02-19 PROCEDURE — 77002 NEEDLE LOCALIZATION BY XRAY: CPT | Mod: RT

## 2024-02-19 PROCEDURE — 2500000005 HC RX 250 GENERAL PHARMACY W/O HCPCS: Performed by: ORTHOPAEDIC SURGERY

## 2024-02-19 PROCEDURE — 2500000004 HC RX 250 GENERAL PHARMACY W/ HCPCS (ALT 636 FOR OP/ED): Performed by: ORTHOPAEDIC SURGERY

## 2024-02-19 PROCEDURE — 2550000001 HC RX 255 CONTRASTS: Performed by: ORTHOPAEDIC SURGERY

## 2024-02-19 RX ORDER — VARENICLINE TARTRATE 1 MG/1
1 TABLET, FILM COATED ORAL 2 TIMES DAILY
Qty: 60 TABLET | Refills: 2 | Status: SHIPPED | OUTPATIENT
Start: 2024-02-19 | End: 2024-02-19 | Stop reason: WASHOUT

## 2024-02-19 RX ORDER — LIDOCAINE HYDROCHLORIDE 10 MG/ML
5 INJECTION, SOLUTION EPIDURAL; INFILTRATION; INTRACAUDAL; PERINEURAL ONCE
Status: COMPLETED | OUTPATIENT
Start: 2024-02-19 | End: 2024-02-19

## 2024-02-19 RX ORDER — VARENICLINE TARTRATE 1 MG/1
1 TABLET, FILM COATED ORAL 2 TIMES DAILY
Qty: 60 TABLET | Refills: 2 | Status: SHIPPED | OUTPATIENT
Start: 2024-02-19 | End: 2024-05-14 | Stop reason: ALTCHOICE

## 2024-02-19 RX ORDER — LIDOCAINE HYDROCHLORIDE 10 MG/ML
3 INJECTION, SOLUTION EPIDURAL; INFILTRATION; INTRACAUDAL; PERINEURAL ONCE
Status: COMPLETED | OUTPATIENT
Start: 2024-02-19 | End: 2024-02-19

## 2024-02-19 RX ORDER — METHYLPREDNISOLONE ACETATE 40 MG/ML
40 INJECTION, SUSPENSION INTRA-ARTICULAR; INTRALESIONAL; INTRAMUSCULAR; SOFT TISSUE ONCE
Status: COMPLETED | OUTPATIENT
Start: 2024-02-19 | End: 2024-02-19

## 2024-02-19 RX ADMIN — METHYLPREDNISOLONE ACETATE 40 MG: 40 INJECTION, SUSPENSION INTRA-ARTICULAR; INTRALESIONAL; INTRAMUSCULAR; INTRASYNOVIAL; SOFT TISSUE at 10:08

## 2024-02-19 RX ADMIN — LIDOCAINE HYDROCHLORIDE 4 ML: 10 INJECTION, SOLUTION EPIDURAL; INFILTRATION; INTRACAUDAL; PERINEURAL at 10:09

## 2024-02-19 RX ADMIN — LIDOCAINE HYDROCHLORIDE 3 ML: 10 INJECTION, SOLUTION EPIDURAL; INFILTRATION; INTRACAUDAL; PERINEURAL at 10:08

## 2024-02-19 RX ADMIN — IOHEXOL 2 ML: 240 INJECTION, SOLUTION INTRATHECAL; INTRAVASCULAR; INTRAVENOUS; ORAL at 10:07

## 2024-02-19 NOTE — TELEPHONE ENCOUNTER
Please resend most recent Rx for Verenicline to [facility] in [place] per patient request. ( Rx was sent to [facility]. )

## 2024-02-21 ENCOUNTER — LAB (OUTPATIENT)
Dept: LAB | Facility: LAB | Age: 64
End: 2024-02-21
Payer: MEDICARE

## 2024-02-21 ENCOUNTER — PRE-ADMISSION TESTING (OUTPATIENT)
Dept: PREADMISSION TESTING | Facility: HOSPITAL | Age: 64
End: 2024-02-21
Payer: MEDICARE

## 2024-02-21 VITALS
DIASTOLIC BLOOD PRESSURE: 84 MMHG | BODY MASS INDEX: 36.73 KG/M2 | WEIGHT: 220.46 LBS | HEIGHT: 65 IN | HEART RATE: 80 BPM | TEMPERATURE: 98.6 F | OXYGEN SATURATION: 99 % | SYSTOLIC BLOOD PRESSURE: 154 MMHG

## 2024-02-21 DIAGNOSIS — Z01.818 PREOP TESTING: ICD-10-CM

## 2024-02-21 DIAGNOSIS — Z01.818 PREOP TESTING: Primary | ICD-10-CM

## 2024-02-21 LAB
ANION GAP SERPL CALC-SCNC: 16 MMOL/L
APPEARANCE UR: CLEAR
BASOPHILS # BLD AUTO: 0.09 X10*3/UL (ref 0–0.1)
BASOPHILS NFR BLD AUTO: 0.7 %
BILIRUB UR STRIP.AUTO-MCNC: NEGATIVE MG/DL
BUN SERPL-MCNC: 26 MG/DL (ref 8–25)
CALCIUM SERPL-MCNC: 10.2 MG/DL (ref 8.5–10.4)
CHLORIDE SERPL-SCNC: 99 MMOL/L (ref 97–107)
CO2 SERPL-SCNC: 27 MMOL/L (ref 24–31)
COLOR UR: YELLOW
CREAT SERPL-MCNC: 0.8 MG/DL (ref 0.4–1.6)
EGFRCR SERPLBLD CKD-EPI 2021: 82 ML/MIN/1.73M*2
EOSINOPHIL # BLD AUTO: 0.15 X10*3/UL (ref 0–0.7)
EOSINOPHIL NFR BLD AUTO: 1.2 %
ERYTHROCYTE [DISTWIDTH] IN BLOOD BY AUTOMATED COUNT: 14.2 % (ref 11.5–14.5)
GLUCOSE SERPL-MCNC: 105 MG/DL (ref 65–99)
GLUCOSE UR STRIP.AUTO-MCNC: NORMAL MG/DL
HCT VFR BLD AUTO: 47.3 % (ref 36–46)
HGB BLD-MCNC: 15 G/DL (ref 12–16)
HYALINE CASTS #/AREA URNS AUTO: ABNORMAL /LPF
IMM GRANULOCYTES # BLD AUTO: 0.06 X10*3/UL (ref 0–0.7)
IMM GRANULOCYTES NFR BLD AUTO: 0.5 % (ref 0–0.9)
KETONES UR STRIP.AUTO-MCNC: NEGATIVE MG/DL
LEUKOCYTE ESTERASE UR QL STRIP.AUTO: ABNORMAL
LYMPHOCYTES # BLD AUTO: 2.85 X10*3/UL (ref 1.2–4.8)
LYMPHOCYTES NFR BLD AUTO: 21.9 %
MCH RBC QN AUTO: 28.5 PG (ref 26–34)
MCHC RBC AUTO-ENTMCNC: 31.7 G/DL (ref 32–36)
MCV RBC AUTO: 90 FL (ref 80–100)
MONOCYTES # BLD AUTO: 0.79 X10*3/UL (ref 0.1–1)
MONOCYTES NFR BLD AUTO: 6.1 %
MUCOUS THREADS #/AREA URNS AUTO: ABNORMAL /LPF
NEUTROPHILS # BLD AUTO: 9.06 X10*3/UL (ref 1.2–7.7)
NEUTROPHILS NFR BLD AUTO: 69.6 %
NITRITE UR QL STRIP.AUTO: NEGATIVE
NRBC BLD-RTO: 0 /100 WBCS (ref 0–0)
PH UR STRIP.AUTO: 5.5 [PH]
PLATELET # BLD AUTO: 320 X10*3/UL (ref 150–450)
POTASSIUM SERPL-SCNC: 4.5 MMOL/L (ref 3.4–5.1)
PROT UR STRIP.AUTO-MCNC: ABNORMAL MG/DL
RBC # BLD AUTO: 5.27 X10*6/UL (ref 4–5.2)
RBC # UR STRIP.AUTO: NEGATIVE /UL
RBC #/AREA URNS AUTO: ABNORMAL /HPF
SODIUM SERPL-SCNC: 142 MMOL/L (ref 133–145)
SP GR UR STRIP.AUTO: 1.03
SQUAMOUS #/AREA URNS AUTO: ABNORMAL /HPF
UROBILINOGEN UR STRIP.AUTO-MCNC: NORMAL MG/DL
WBC # BLD AUTO: 13 X10*3/UL (ref 4.4–11.3)
WBC #/AREA URNS AUTO: ABNORMAL /HPF

## 2024-02-21 PROCEDURE — 81001 URINALYSIS AUTO W/SCOPE: CPT

## 2024-02-21 PROCEDURE — 87081 CULTURE SCREEN ONLY: CPT | Mod: WESLAB | Performed by: NURSE PRACTITIONER

## 2024-02-21 PROCEDURE — 36415 COLL VENOUS BLD VENIPUNCTURE: CPT

## 2024-02-21 PROCEDURE — 80048 BASIC METABOLIC PNL TOTAL CA: CPT

## 2024-02-21 PROCEDURE — 87086 URINE CULTURE/COLONY COUNT: CPT

## 2024-02-21 PROCEDURE — 85025 COMPLETE CBC W/AUTO DIFF WBC: CPT

## 2024-02-21 PROCEDURE — 93005 ELECTROCARDIOGRAM TRACING: CPT

## 2024-02-21 PROCEDURE — 99204 OFFICE O/P NEW MOD 45 MIN: CPT | Performed by: NURSE PRACTITIONER

## 2024-02-21 RX ORDER — BISMUTH SUBSALICYLATE 262 MG
1 TABLET,CHEWABLE ORAL DAILY
COMMUNITY

## 2024-02-21 RX ORDER — ACETAMINOPHEN 500 MG
1000 TABLET ORAL EVERY 8 HOURS PRN
COMMUNITY

## 2024-02-21 ASSESSMENT — ENCOUNTER SYMPTOMS
ENDOCRINE NEGATIVE: 1
NEUROLOGICAL NEGATIVE: 1
RESPIRATORY NEGATIVE: 1
HEMATOLOGIC/LYMPHATIC NEGATIVE: 1
CONSTITUTIONAL NEGATIVE: 1
CARDIOVASCULAR NEGATIVE: 1
PSYCHIATRIC NEGATIVE: 1
GASTROINTESTINAL NEGATIVE: 1
EYES NEGATIVE: 1

## 2024-02-21 ASSESSMENT — CHADS2 SCORE
CHF: NO
PRIOR STROKE OR TIA OR THROMBOEMBOLISM: NO
CHADS2 SCORE: 1
HYPERTENSION: YES
AGE GREATER THAN OR EQUAL TO 75: NO
DIABETES: NO

## 2024-02-21 ASSESSMENT — PAIN DESCRIPTION - DESCRIPTORS: DESCRIPTORS: SHARP;ACHING

## 2024-02-21 ASSESSMENT — PAIN SCALES - GENERAL: PAINLEVEL_OUTOF10: 5 - MODERATE PAIN

## 2024-02-21 ASSESSMENT — PAIN - FUNCTIONAL ASSESSMENT: PAIN_FUNCTIONAL_ASSESSMENT: 0-10

## 2024-02-21 NOTE — PROGRESS NOTES
Called Dr Tenorio's office at this time to notify them of the patients WBC count of 13. I also faxed over a copy of the abnormal lab result. Office staff stated they received the fax and would notify the surgeon.    FAN Renner-CNP

## 2024-02-21 NOTE — CPM/PAT H&P
CPM/PAT Evaluation       Name: Monse Aguilera (Monse Aguilera)  /Age: 1960/63 y.o.     In-Person       Chief Complaint: left shoulder pain    HPI    Pt is a 63 year old female with left shoulder arthritis. Pt reports the shoulder pain started about one year ago and has progressively worsened. Pt is managing the pain with Naproxen with some relief from the pain. Pt denies weakness, numbness, or tingling in her left hand. Pt was examined by her surgeon and has been scheduled for left total reverse arthroplasty. Pt denies CP, SOB, or dizziness.     Past Medical History:   Diagnosis Date    Anxiety     Arthritis     Catecholaminergic polymorphic ventricular tachycardia (CMS/HCC)     Depression     Hard to intubate     History of transfusion     Hyperlipidemia     Hypertension     Hypothyroidism     Migraine     Nephrolithiasis        Past Surgical History:   Procedure Laterality Date    BUNIONECTOMY      CARDIAC DEFIBRILLATOR PLACEMENT Left      SECTION, LOW TRANSVERSE      FL GUIDED INJECTION HIP RIGHT Right 2023    FL GUIDED INJECTION HIP RIGHT 2023 [facility]    LUMBAR FUSION      LUMBAR LAMINECTOMY      OOPHORECTOMY      OTHER SURGICAL HISTORY      Generator change in ICD    TOTAL KNEE ARTHROPLASTY Bilateral     TOTAL SHOULDER ARTHROPLASTY Right 10/2023     Social History     Tobacco Use    Smoking status: Former     Packs/day: 1.00     Years: 49.00     Additional pack years: 0.00     Total pack years: 49.00     Types: Cigarettes     Start date:      Quit date:      Years since quittin.1     Passive exposure: Current    Smokeless tobacco: Never    Tobacco comments:     Patient states is attempting to stop smoking.  Smoked 0.5 cigarette per day.   Substance Use Topics    Alcohol use: Not Currently     Social History     Substance and Sexual Activity   Drug Use Never     Patient  has no history on file for sexual activity.    Family History   Problem Relation Name Age of  Onset    Other (elevated lipids) Mother      Rheum arthritis Mother      Diabetes Mother      Hypertension Mother      Other (Elevated lipids) Father      Coronary artery disease Father      Kidney failure Father      Hypertension Father      Diabetes Father      Heart disease Father      Other (thyroid problems) Sister      Other (Cardiac arrest) Son      Hyperlipidemia Sibling         Allergies   Allergen Reactions    Adhesive Rash    Other Other    Silver-Calcium Alginate Rash    Sulfa (Sulfonamide Antibiotics) Rash     Current Outpatient Medications   Medication Sig Dispense Refill    acetaminophen (Tylenol) 500 mg tablet Take 2 tablets (1,000 mg) by mouth every 8 hours if needed for mild pain (1 - 3).      atorvastatin (Lipitor) 40 mg tablet Take 1 tablet (40 mg) by mouth once daily. 90 tablet 1    buPROPion XL (Wellbutrin XL) 300 mg 24 hr tablet Take 1 tablet (300 mg) by mouth once daily in the morning. 90 tablet 1    cholecalciferol (Vitamin D-3) 25 MCG (1000 UT) capsule Take 1 capsule (25 mcg) by mouth once daily.      levothyroxine (Synthroid, Levoxyl) 175 mcg tablet Take 1 tablet (175 mcg) by mouth once daily in the morning. Take before meals. 90 tablet 1    losartan (Cozaar) 100 mg tablet Take 1 tablet (100 mg) by mouth once daily.      multivitamin tablet Take 1 tablet by mouth once daily.      naproxen sodium (Aleve) 220 mg tablet Take 2 tablets (440 mg) by mouth 2 times a day with meals.      propranolol LA (Inderal LA) 120 mg 24 hr capsule Take 2 capsules (240 mg) by mouth once daily. Do not crush, chew, or split. 180 capsule 1    sertraline (Zoloft) 100 mg tablet Take 2 tablets (200 mg) by mouth once daily. 180 tablet 1    varenicline (Chantix) 1 mg tablet Take 1 tablet (1 mg) by mouth 2 times a day. Take with full glass of water. 60 tablet 2     No current facility-administered medications for this visit.     Review of Systems   Constitutional: Negative.    HENT: Negative.     Eyes: Negative.   "  Respiratory: Negative.     Cardiovascular: Negative.    Gastrointestinal: Negative.    Endocrine: Negative.    Genitourinary: Negative.    Musculoskeletal:         Left shoulder pain   Right hip pain   Skin: Negative.    Neurological: Negative.    Hematological: Negative.    Psychiatric/Behavioral: Negative.       /84   Pulse 80   Temp 37 °C (98.6 °F) (Temporal)   Ht 1.651 m (5' 5\")   Wt 100 kg (220 lb 7.4 oz)   SpO2 99%   BMI 36.69 kg/m²     Physical Exam  Vitals reviewed.   Constitutional:       Appearance: Normal appearance.   HENT:      Head: Normocephalic and atraumatic.      Nose: Nose normal.      Mouth/Throat:      Mouth: Mucous membranes are moist.      Pharynx: Oropharynx is clear.   Eyes:      Extraocular Movements: Extraocular movements intact.      Conjunctiva/sclera: Conjunctivae normal.      Pupils: Pupils are equal, round, and reactive to light.   Cardiovascular:      Rate and Rhythm: Normal rate and regular rhythm.      Pulses: Normal pulses.      Heart sounds: Normal heart sounds.   Pulmonary:      Effort: Pulmonary effort is normal.      Breath sounds: Normal breath sounds.   Abdominal:      General: Bowel sounds are normal.      Palpations: Abdomen is soft.   Musculoskeletal:      Cervical back: Normal range of motion and neck supple.      Comments: Left shoulder pain with ROM  Ambulates with cane d/t right hip pain and back support   Skin:     General: Skin is warm and dry.   Neurological:      General: No focal deficit present.      Mental Status: She is alert and oriented to person, place, and time.   Psychiatric:         Mood and Affect: Mood normal.         Behavior: Behavior normal.         Thought Content: Thought content normal.         Judgment: Judgment normal.        PAT AIRWAY:   Airway:     Mallampati::  II    TM distance::  >3 FB    Neck ROM::  Full    ASA: III  CHADS: 2.8%  RCRI: 0.4%  STOP BAN  The patient did not wish to fully complete the DASI Questions "     Assessment and Plan:   Arthritis of left shoulder region: arthroplasty reverse shoulder left  Catecholaminergic polymorphic ventricular tachycardia: ICD placed in 2005 with generator replacement in 2015. Pt reports the last time the battery was checked was in 2020 and per pt the report showed 5 years of battery life remaining. Per pt the ICD has never went off. Pt used to see Dr Finnegan. Pt reports when the ICD device battery runs out she does not wish to have a new generator placed. Pt reports the device was placed d/t one of her children being diagnosed with the problem and finding out that the genetic condition was passed from her to her children.   HTN: pt is taking losartan.  Hypothyroidism: 10/3/2023: TSH: 1.65  Pt quit smoking cigarettes on 2/18/2024.    Performed EKG at [facility] preliminary report: NSR, possible left atrial enlargement, left axis deviation.    CBC, BMP, and UA completed today.    Pt had right shoulder replacement surgery on 10/13/2023.    Spoke to Dr José Mckeon discussed pt's medical history.  stated pt is okay to proceed with surgery without recent ICD check.    Angela Parra, APRN-CNP

## 2024-02-21 NOTE — PREPROCEDURE INSTRUCTIONS
Medication List            Accurate as of February 21, 2024  1:23 PM. Always use your most recent med list.                acetaminophen 500 mg tablet  Commonly known as: Tylenol  Notes to patient: MAY TAKE MORNING OF SURGERY WITH SMALL SIP OF WATER IF NEEDED     Aleve 220 mg tablet  Generic drug: naproxen sodium  Medication Adjustments for Surgery: Stop 7 days before surgery     atorvastatin 40 mg tablet  Commonly known as: Lipitor  Take 1 tablet (40 mg) by mouth once daily.  Medication Adjustments for Surgery: Take morning of surgery with sip of water, no other fluids     buPROPion  mg 24 hr tablet  Commonly known as: Wellbutrin XL  Take 1 tablet (300 mg) by mouth once daily in the morning.  Medication Adjustments for Surgery: Take morning of surgery with sip of water, no other fluids     cholecalciferol 25 MCG (1000 UT) capsule  Commonly known as: Vitamin D-3  Medication Adjustments for Surgery: Stop 7 days before surgery     levothyroxine 175 mcg tablet  Commonly known as: Synthroid, Levoxyl  Take 1 tablet (175 mcg) by mouth once daily in the morning. Take before meals.  Medication Adjustments for Surgery: Take morning of surgery with sip of water, no other fluids     losartan 100 mg tablet  Commonly known as: Cozaar  Medication Adjustments for Surgery: Stop 1 day before surgery     multivitamin tablet  Medication Adjustments for Surgery: Stop 7 days before surgery     propranolol  mg 24 hr capsule  Commonly known as: Inderal LA  Take 2 capsules (240 mg) by mouth once daily. Do not crush, chew, or split.  Medication Adjustments for Surgery: Take morning of surgery with sip of water, no other fluids     sertraline 100 mg tablet  Commonly known as: Zoloft  Take 2 tablets (200 mg) by mouth once daily.  Medication Adjustments for Surgery: Take morning of surgery with sip of water, no other fluids     varenicline 1 mg tablet  Commonly known as: Chantix  Take 1 tablet (1 mg) by mouth 2 times a day. Take  with full glass of water.  Medication Adjustments for Surgery: Continue until night before surgery                              NPO Instructions:    Do not eat any food after midnight the night before your surgery/procedure.    Additional Instructions:     Day of Surgery:  Wear  comfortable loose fitting clothing  Do not use moisturizers, creams, lotions or perfume  All jewelry and valuables should be left at home   Home Preoperative Antibacterial Shower    What is a home preoperative antibacterial shower?  This shower is a way of cleaning the skin with a germ killing solution before surgery. The solution contains chlorhexidine, commonly known as CHG. CHG is a skin cleanser with germ killing ability. Let your doctor know if you are allergic to chlorhexidine.      Why do I need to take a preoperative antibacterial shower?  Skin is not sterile. It is best to try to make your skin as free of germs as possible before surgery. Proper cleansing with a germ killing soap before surgery can lower the number of germs on your skin. This helps to reduce the risk of infection at the surgical site. Following the instructions listed below will help you prepare your skin for surgery.    How do I use the solution?      Steps: Begin using your CHG soap START TONIGHT  First, wash and rinse your hair. Keep CHG away soap away from ear canals and eyes.  Rinse completely, do not condition. Hair extensions should be removed.  Wash your face with your normal soap and rinse.  Apply the CHG solution to a clean wet washcloth. Turn the water off or move away from the water spray to avoid premature rinsing of the CHG soap as you are applying.  Firmly lather your entire body from neck down. Do not use on face.  Pay special attention to the areas(s) where your incision(s) will be located unless they are on your face.  Avoid scrubbing your skin too hard.  The important point is to have the CHG soap sit on your skin for 3 minutes.  DO NOT wash with  regular soap after you have used the CHG soap solution.  Pat yourself dry with a clean, freshly laundered towel.  DO NOT apply powders, deodorants or lotions.  Dress in clean, freshly laundered night clothes.  Be sure to sleep with clean, freshly laundered sheets.  Be aware that CHG will cause stains on fabrics; if you wash them with bleach after use. Rinse your washcloth and other linens that have contact with CHG completely. Use only non-chlorine detergents to launder the items used.  The morning of surgery . Repeat the above steps and dress in clean comfortable clothing.   Patient Information: Oral/Dental Rinse    What is oral/dental rinse?  It is a mouthwash. It is a way of cleaning the mouth with a germ killing solution before your surgery. The solution contains chlorhexidine, commonly know as CHG.  It is used inside the mouth to kill bacteria known as Staphylococcus aureus.  Let your doctor know if you are allergic to chlorhexidine.    Why do I need to use CHG oral/dental rinse?  The CHG oral/dental rinse helps to kill bacteria in your mouth known as Staphylococcus aureus. This reduces the risk of infection at the surgical site.    Using your CHG oral/dental rinse.  STEPS: use your CHG oral/dental rinse after you brush your teeth the night before (at bedtime) and the morning of your surgery. Follow the directions on your prescription label.  Use the cap on the container to measure 15ml (fill cap to fill line)  Swish (gargle if you can) the mouthwash in your mouth for at least 30 seconds, (do not swallow) spit out.  After you use your CHG rinse, do not rinse your mouth with water, drink or eat. Please refer to prescription label for the appropriate time to resume oral intake.    What side effects might I have using the CHG oral/dental rinse?  CHG rinse will stick to the plaque on the teeth. Brush and floss just before use. Teeth brushing will help avoid staining of plaque during use.    Who should I contact if  I have questions about the CHG oral/dental rinse?  Please call [facility], [facility] at [phone] if you have any questions.  PAT DISCHARGE INSTRUCTIONS    Please call the Same Day Surgery (SDS) Department of the hospital where your procedure will be performed after 2:00 PM the day before your surgery. If you are scheduled on a Monday, or a Tuesday following a Monday holiday, you will need to call on the last business day prior to your surgery.    [address] [address] [phone]    Please let your surgeon know if:      You develop any open sores, shingles, burning or painful urination as these may increase your risk of an infection.   You no longer wish to have the surgery.   Any other personal circumstances change that may lead to the need to cancel or defer this surgery-such as being sick or getting admitted to any hospital within one week of your planned procedure.    Your contact details change, such as a change of address or phone number.    Starting now:     Please DO NOT drink alcohol or smoke for 24 hours before surgery. It is well known that quitting smoking can make a huge difference to your health and recovery from surgery. The longer you abstain from smoking, the better your chances of a healthy recovery. If you need help with quitting, call 6-287-QUIT-NOW to be connected to a trained counselor who will discuss the best methods to help you quit.     Before your surgery:    Please stop all supplements 7 days prior to surgery. Or as directed by your surgeon.   Please stop taking NSAID pain medicine such as Advil and Motrin 7 days before surgery.    If you develop any fever, cough, cold, rashes, cuts, scratches, scrapes, urinary symptoms or infection anywhere on your body (including teeth and gums) prior to surgery, please call your surgeon’s office as soon as possible. This may  require treatment to reduce the chance of cancellation on the day of surgery.    The day before your surgery:   DIET- Do not eat or drink anything after midnight the night before surgery, including mints, candy and gum.   Get a good night’s rest.  Use the special soap for bathing if you have been instructed to use one.    Scheduled surgery times may change and you will be notified if this occurs - please check your personal voicemail for any updates.     On the morning of surgery:   Wear comfortable, loose fitting clothes which open in the front. Please do not wear moisturizers, creams, lotions, makeup or perfume.    Please bring with you to surgery:   Photo ID and insurance card   Current list of medicines and allergies   Pacemaker/ Defibrillator/Heart stent cards   CPAP machine and mask    Slings/ splints/ crutches   A copy of your complete advanced directive/DHPOA.    Please do NOT bring with you to surgery:   All jewelry and valuables should be left at home.   Prosthetic devices such as contact lenses, hearing aids, dentures, eyelash extensions, hairpins and body piercings must be removed prior to going in to the surgical suite.    After outpatient surgery:   A responsible adult MUST accompany you at the time of discharge and stay with you for 24 hours after your surgery. You may NOT drive yourself home after surgery.    Do not drive, operate machinery, make critical decisions or do activities that require co-ordination or balance until after a night’s sleep.   Do not drink alcoholic beverages for 24 hours.   Instructions for resuming your medications will be provided by your surgeon.    CALL YOUR DOCTOR AFTER SURGERY IF YOU HAVE:     Chills and/or a fever of 101° F or higher.    Redness, swelling, pus or drainage from your surgical wound or a bad smell from the wound.    Lightheadedness, fainting or confusion.    Persistent vomiting (throwing up) and are not able to eat or drink for 12 hours.    Three or more  loose, watery bowel movements in 24 hours (diarrhea).   Difficulty or pain while urinating( after non-urological surgery)    Pain and swelling in your legs, especially if it is only on one side.    Difficulty breathing or are breathing faster than normal.    Any new concerning symptoms.    Who should I call if I have any questions regarding the use of CHG soap?  Call the [facility] at [phone] if you have any questions.    Who should I call if I have any questions regarding the use of CHG soap?  Call the [facility] at [phone] if you have any questions.

## 2024-02-22 LAB
BACTERIA UR CULT: NORMAL
HOLD SPECIMEN: NORMAL

## 2024-02-23 ENCOUNTER — ANESTHESIA (OUTPATIENT)
Dept: OPERATING ROOM | Facility: HOSPITAL | Age: 64
End: 2024-02-23
Payer: MEDICARE

## 2024-02-23 ENCOUNTER — APPOINTMENT (OUTPATIENT)
Dept: RADIOLOGY | Facility: HOSPITAL | Age: 64
End: 2024-02-23
Payer: MEDICARE

## 2024-02-23 ENCOUNTER — ANESTHESIA EVENT (OUTPATIENT)
Dept: OPERATING ROOM | Facility: HOSPITAL | Age: 64
End: 2024-02-23
Payer: MEDICARE

## 2024-02-23 ENCOUNTER — PHARMACY VISIT (OUTPATIENT)
Dept: PHARMACY | Facility: CLINIC | Age: 64
End: 2024-02-23
Payer: MEDICARE

## 2024-02-23 ENCOUNTER — HOSPITAL ENCOUNTER (OUTPATIENT)
Facility: HOSPITAL | Age: 64
Discharge: HOME | End: 2024-02-23
Attending: ORTHOPAEDIC SURGERY | Admitting: ORTHOPAEDIC SURGERY
Payer: MEDICARE

## 2024-02-23 VITALS
DIASTOLIC BLOOD PRESSURE: 72 MMHG | WEIGHT: 220 LBS | TEMPERATURE: 96.8 F | HEART RATE: 69 BPM | SYSTOLIC BLOOD PRESSURE: 113 MMHG | OXYGEN SATURATION: 98 % | RESPIRATION RATE: 16 BRPM | BODY MASS INDEX: 36.65 KG/M2 | HEIGHT: 65 IN

## 2024-02-23 DIAGNOSIS — M19.011 ARTHRITIS OF RIGHT SHOULDER REGION: ICD-10-CM

## 2024-02-23 DIAGNOSIS — M25.511 ACUTE PAIN OF RIGHT SHOULDER: ICD-10-CM

## 2024-02-23 DIAGNOSIS — M19.019 SHOULDER ARTHRITIS: Primary | ICD-10-CM

## 2024-02-23 LAB — STAPHYLOCOCCUS SPEC CULT: NORMAL

## 2024-02-23 PROCEDURE — 2500000005 HC RX 250 GENERAL PHARMACY W/O HCPCS: Performed by: ORTHOPAEDIC SURGERY

## 2024-02-23 PROCEDURE — 3600000005 HC OR TIME - INITIAL BASE CHARGE - PROCEDURE LEVEL FIVE: Performed by: ORTHOPAEDIC SURGERY

## 2024-02-23 PROCEDURE — L3670 SO ACRO/CLAV CAN WEB PRE OTS: HCPCS | Performed by: ORTHOPAEDIC SURGERY

## 2024-02-23 PROCEDURE — 7100000001 HC RECOVERY ROOM TIME - INITIAL BASE CHARGE: Performed by: ORTHOPAEDIC SURGERY

## 2024-02-23 PROCEDURE — 73030 X-RAY EXAM OF SHOULDER: CPT | Mod: LT

## 2024-02-23 PROCEDURE — 2500000005 HC RX 250 GENERAL PHARMACY W/O HCPCS: Performed by: ANESTHESIOLOGY

## 2024-02-23 PROCEDURE — RXMED WILLOW AMBULATORY MEDICATION CHARGE

## 2024-02-23 PROCEDURE — 2780000003 HC OR 278 NO HCPCS: Performed by: ORTHOPAEDIC SURGERY

## 2024-02-23 PROCEDURE — 2500000004 HC RX 250 GENERAL PHARMACY W/ HCPCS (ALT 636 FOR OP/ED): Performed by: ORTHOPAEDIC SURGERY

## 2024-02-23 PROCEDURE — 7100000011 HC EXTENDED STAY RECOVERY HOURLY - NURSING UNIT

## 2024-02-23 PROCEDURE — 7100000002 HC RECOVERY ROOM TIME - EACH INCREMENTAL 1 MINUTE: Performed by: ORTHOPAEDIC SURGERY

## 2024-02-23 PROCEDURE — 2500000004 HC RX 250 GENERAL PHARMACY W/ HCPCS (ALT 636 FOR OP/ED): Performed by: ANESTHESIOLOGY

## 2024-02-23 PROCEDURE — 2720000007 HC OR 272 NO HCPCS: Performed by: ORTHOPAEDIC SURGERY

## 2024-02-23 PROCEDURE — 7100000009 HC PHASE TWO TIME - INITIAL BASE CHARGE: Performed by: ORTHOPAEDIC SURGERY

## 2024-02-23 PROCEDURE — 7100000010 HC PHASE TWO TIME - EACH INCREMENTAL 1 MINUTE: Performed by: ORTHOPAEDIC SURGERY

## 2024-02-23 PROCEDURE — 3600000010 HC OR TIME - EACH INCREMENTAL 1 MINUTE - PROCEDURE LEVEL FIVE: Performed by: ORTHOPAEDIC SURGERY

## 2024-02-23 PROCEDURE — 3700000002 HC GENERAL ANESTHESIA TIME - EACH INCREMENTAL 1 MINUTE: Performed by: ORTHOPAEDIC SURGERY

## 2024-02-23 PROCEDURE — 2500000001 HC RX 250 WO HCPCS SELF ADMINISTERED DRUGS (ALT 637 FOR MEDICARE OP): Performed by: ORTHOPAEDIC SURGERY

## 2024-02-23 PROCEDURE — 2740000001 HC OR 274 NO HCPCS: Performed by: ORTHOPAEDIC SURGERY

## 2024-02-23 PROCEDURE — C1776 JOINT DEVICE (IMPLANTABLE): HCPCS | Performed by: ORTHOPAEDIC SURGERY

## 2024-02-23 PROCEDURE — C1713 ANCHOR/SCREW BN/BN,TIS/BN: HCPCS | Performed by: ORTHOPAEDIC SURGERY

## 2024-02-23 PROCEDURE — 3700000001 HC GENERAL ANESTHESIA TIME - INITIAL BASE CHARGE: Performed by: ORTHOPAEDIC SURGERY

## 2024-02-23 DEVICE — IMPLANTABLE DEVICE: Type: IMPLANTABLE DEVICE | Site: SHOULDER | Status: FUNCTIONAL

## 2024-02-23 DEVICE — DYNANITE VIP GLENOID PIN, NITINOL, 2.8MM
Type: IMPLANTABLE DEVICE | Site: SHOULDER | Status: FUNCTIONAL
Brand: ARTHREX®

## 2024-02-23 DEVICE — SIMPLEX® HV IS A FAST-SETTING ACRYLIC RESIN FOR USE IN BONE SURGERY. MIXING THE TWO SEPARATE STERILE COMPONENTS PRODUCES A DUCTILE BONE CEMENT WHICH, AFTER HARDENING, FIXES THE IMPLANT AND TRANSFERS STRESSES PRODUCED DURING MOVEMENT EVENLY TO THE BONE. SIMPLEX® HV CEMENT POWDER ALSO CONTAINS INSOLUBLE ZIRCONIUM DIOXIDE AS AN X-RAY CONTRAST MEDIUM. SIMPLEX® HV DOES NOT EMIT A SIGNAL AND DOES NOT POSE A SAFETY RISK IN A MAGNETIC RESONANCE ENVIRONMENT.
Type: IMPLANTABLE DEVICE | Site: SHOULDER | Status: FUNCTIONAL
Brand: SIMPLEX HV

## 2024-02-23 RX ORDER — SODIUM CHLORIDE, SODIUM LACTATE, POTASSIUM CHLORIDE, CALCIUM CHLORIDE 600; 310; 30; 20 MG/100ML; MG/100ML; MG/100ML; MG/100ML
100 INJECTION, SOLUTION INTRAVENOUS CONTINUOUS
Status: DISCONTINUED | OUTPATIENT
Start: 2024-02-23 | End: 2024-02-23 | Stop reason: HOSPADM

## 2024-02-23 RX ORDER — ONDANSETRON HYDROCHLORIDE 2 MG/ML
INJECTION, SOLUTION INTRAVENOUS AS NEEDED
Status: DISCONTINUED | OUTPATIENT
Start: 2024-02-23 | End: 2024-02-23

## 2024-02-23 RX ORDER — PHENYLEPHRINE HCL IN 0.9% NACL 1 MG/10 ML
SYRINGE (ML) INTRAVENOUS AS NEEDED
Status: DISCONTINUED | OUTPATIENT
Start: 2024-02-23 | End: 2024-02-23

## 2024-02-23 RX ORDER — OXYCODONE HCL 20 MG/1
20 TABLET, FILM COATED, EXTENDED RELEASE ORAL ONCE
Status: DISCONTINUED | OUTPATIENT
Start: 2024-02-23 | End: 2024-02-23 | Stop reason: HOSPADM

## 2024-02-23 RX ORDER — ACETAMINOPHEN 500 MG
500 TABLET ORAL ONCE
Status: DISCONTINUED | OUTPATIENT
Start: 2024-02-23 | End: 2024-02-23 | Stop reason: HOSPADM

## 2024-02-23 RX ORDER — OXYCODONE HYDROCHLORIDE 5 MG/1
5 TABLET ORAL EVERY 6 HOURS PRN
Qty: 25 TABLET | Refills: 0 | Status: SHIPPED | OUTPATIENT
Start: 2024-02-23 | End: 2024-05-14 | Stop reason: ALTCHOICE

## 2024-02-23 RX ORDER — CELECOXIB 200 MG/1
400 CAPSULE ORAL ONCE
Status: COMPLETED | OUTPATIENT
Start: 2024-02-23 | End: 2024-02-23

## 2024-02-23 RX ORDER — TRANEXAMIC ACID 10 MG/ML
INJECTION, SOLUTION INTRAVENOUS AS NEEDED
Status: DISCONTINUED | OUTPATIENT
Start: 2024-02-23 | End: 2024-02-23

## 2024-02-23 RX ORDER — DEXAMETHASONE SODIUM PHOSPHATE 4 MG/ML
INJECTION, SOLUTION INTRA-ARTICULAR; INTRALESIONAL; INTRAMUSCULAR; INTRAVENOUS; SOFT TISSUE AS NEEDED
Status: DISCONTINUED | OUTPATIENT
Start: 2024-02-23 | End: 2024-02-23

## 2024-02-23 RX ORDER — MIDAZOLAM HYDROCHLORIDE 1 MG/ML
2 INJECTION, SOLUTION INTRAMUSCULAR; INTRAVENOUS ONCE
Status: COMPLETED | OUTPATIENT
Start: 2024-02-23 | End: 2024-02-23

## 2024-02-23 RX ORDER — LIDOCAINE HYDROCHLORIDE 10 MG/ML
INJECTION, SOLUTION EPIDURAL; INFILTRATION; INTRACAUDAL; PERINEURAL AS NEEDED
Status: DISCONTINUED | OUTPATIENT
Start: 2024-02-23 | End: 2024-02-23

## 2024-02-23 RX ORDER — VANCOMYCIN HYDROCHLORIDE 1 G/20ML
INJECTION, POWDER, LYOPHILIZED, FOR SOLUTION INTRAVENOUS AS NEEDED
Status: DISCONTINUED | OUTPATIENT
Start: 2024-02-23 | End: 2024-02-23 | Stop reason: HOSPADM

## 2024-02-23 RX ORDER — SODIUM CHLORIDE, SODIUM LACTATE, POTASSIUM CHLORIDE, CALCIUM CHLORIDE 600; 310; 30; 20 MG/100ML; MG/100ML; MG/100ML; MG/100ML
INJECTION, SOLUTION INTRAVENOUS CONTINUOUS PRN
Status: DISCONTINUED | OUTPATIENT
Start: 2024-02-23 | End: 2024-02-23

## 2024-02-23 RX ORDER — CEFAZOLIN SODIUM 2 G/100ML
2 INJECTION, SOLUTION INTRAVENOUS ONCE
Status: COMPLETED | OUTPATIENT
Start: 2024-02-23 | End: 2024-02-23

## 2024-02-23 RX ORDER — LIDOCAINE HYDROCHLORIDE 10 MG/ML
0.1 INJECTION INFILTRATION; PERINEURAL ONCE
Status: DISCONTINUED | OUTPATIENT
Start: 2024-02-23 | End: 2024-02-23 | Stop reason: HOSPADM

## 2024-02-23 RX ORDER — GLYCOPYRROLATE 0.2 MG/ML
INJECTION INTRAMUSCULAR; INTRAVENOUS AS NEEDED
Status: DISCONTINUED | OUTPATIENT
Start: 2024-02-23 | End: 2024-02-23

## 2024-02-23 RX ORDER — FENTANYL CITRATE 50 UG/ML
50 INJECTION, SOLUTION INTRAMUSCULAR; INTRAVENOUS ONCE
Status: COMPLETED | OUTPATIENT
Start: 2024-02-23 | End: 2024-02-23

## 2024-02-23 RX ORDER — NEOSTIGMINE METHYLSULFATE 1 MG/ML
INJECTION, SOLUTION INTRAVENOUS AS NEEDED
Status: DISCONTINUED | OUTPATIENT
Start: 2024-02-23 | End: 2024-02-23

## 2024-02-23 RX ORDER — ROCURONIUM BROMIDE 10 MG/ML
INJECTION, SOLUTION INTRAVENOUS AS NEEDED
Status: DISCONTINUED | OUTPATIENT
Start: 2024-02-23 | End: 2024-02-23

## 2024-02-23 RX ORDER — FENTANYL CITRATE 50 UG/ML
25 INJECTION, SOLUTION INTRAMUSCULAR; INTRAVENOUS EVERY 5 MIN PRN
Status: DISCONTINUED | OUTPATIENT
Start: 2024-02-23 | End: 2024-02-23 | Stop reason: HOSPADM

## 2024-02-23 RX ORDER — PROPOFOL 10 MG/ML
INJECTION, EMULSION INTRAVENOUS AS NEEDED
Status: DISCONTINUED | OUTPATIENT
Start: 2024-02-23 | End: 2024-02-23

## 2024-02-23 RX ORDER — FENTANYL CITRATE 50 UG/ML
50 INJECTION, SOLUTION INTRAMUSCULAR; INTRAVENOUS EVERY 5 MIN PRN
Status: DISCONTINUED | OUTPATIENT
Start: 2024-02-23 | End: 2024-02-23 | Stop reason: HOSPADM

## 2024-02-23 RX ORDER — PREGABALIN 75 MG/1
75 CAPSULE ORAL ONCE
Status: COMPLETED | OUTPATIENT
Start: 2024-02-23 | End: 2024-02-23

## 2024-02-23 RX ADMIN — ONDANSETRON HYDROCHLORIDE 4 MG: 2 INJECTION INTRAMUSCULAR; INTRAVENOUS at 10:12

## 2024-02-23 RX ADMIN — Medication 200 MCG: at 08:52

## 2024-02-23 RX ADMIN — TRANEXAMIC ACID 1000 MG: 10 INJECTION, SOLUTION INTRAVENOUS at 08:45

## 2024-02-23 RX ADMIN — POVIDONE-IODINE 1 APPLICATION: 5 SOLUTION TOPICAL at 06:56

## 2024-02-23 RX ADMIN — Medication 100 MCG: at 09:16

## 2024-02-23 RX ADMIN — Medication 100 MCG: at 09:47

## 2024-02-23 RX ADMIN — PROPOFOL 180 MG: 10 INJECTION, EMULSION INTRAVENOUS at 08:20

## 2024-02-23 RX ADMIN — LIDOCAINE HYDROCHLORIDE 40 ML: 10 INJECTION, SOLUTION EPIDURAL; INFILTRATION; INTRACAUDAL; PERINEURAL at 08:20

## 2024-02-23 RX ADMIN — CELECOXIB 400 MG: 200 CAPSULE ORAL at 06:56

## 2024-02-23 RX ADMIN — PROPOFOL 20 MG: 10 INJECTION, EMULSION INTRAVENOUS at 10:48

## 2024-02-23 RX ADMIN — NEOSTIGMINE METHYLSULFATE 3 MG: 1 INJECTION, SOLUTION INTRAVENOUS at 10:39

## 2024-02-23 RX ADMIN — Medication 100 MCG: at 10:22

## 2024-02-23 RX ADMIN — ROCURONIUM BROMIDE 50 MG: 10 INJECTION, SOLUTION INTRAVENOUS at 08:20

## 2024-02-23 RX ADMIN — SODIUM CHLORIDE, POTASSIUM CHLORIDE, SODIUM LACTATE AND CALCIUM CHLORIDE: 600; 310; 30; 20 INJECTION, SOLUTION INTRAVENOUS at 08:05

## 2024-02-23 RX ADMIN — Medication 100 MCG: at 10:10

## 2024-02-23 RX ADMIN — Medication 100 MCG: at 10:38

## 2024-02-23 RX ADMIN — PREGABALIN 75 MG: 75 CAPSULE ORAL at 06:56

## 2024-02-23 RX ADMIN — DEXAMETHASONE SODIUM PHOSPHATE 4 MG: 4 INJECTION, SOLUTION INTRA-ARTICULAR; INTRALESIONAL; INTRAMUSCULAR; INTRAVENOUS; SOFT TISSUE at 10:13

## 2024-02-23 RX ADMIN — FENTANYL CITRATE 25 MCG: 50 INJECTION INTRAMUSCULAR; INTRAVENOUS at 11:58

## 2024-02-23 RX ADMIN — MIDAZOLAM HYDROCHLORIDE 2 MG: 1 INJECTION, SOLUTION INTRAMUSCULAR; INTRAVENOUS at 07:49

## 2024-02-23 RX ADMIN — CEFAZOLIN SODIUM 2 G: 2 INJECTION, SOLUTION INTRAVENOUS at 08:28

## 2024-02-23 RX ADMIN — SODIUM CHLORIDE, POTASSIUM CHLORIDE, SODIUM LACTATE AND CALCIUM CHLORIDE: 600; 310; 30; 20 INJECTION, SOLUTION INTRAVENOUS at 10:12

## 2024-02-23 RX ADMIN — Medication 100 MCG: at 09:32

## 2024-02-23 RX ADMIN — Medication 100 MCG: at 10:28

## 2024-02-23 RX ADMIN — FENTANYL CITRATE 50 MCG: 50 INJECTION INTRAMUSCULAR; INTRAVENOUS at 07:49

## 2024-02-23 RX ADMIN — GLYCOPYRROLATE 0.6 MG: 0.2 INJECTION INTRAMUSCULAR; INTRAVENOUS at 10:39

## 2024-02-23 SDOH — HEALTH STABILITY: MENTAL HEALTH: CURRENT SMOKER: 1

## 2024-02-23 ASSESSMENT — PAIN - FUNCTIONAL ASSESSMENT
PAIN_FUNCTIONAL_ASSESSMENT: 0-10

## 2024-02-23 ASSESSMENT — COLUMBIA-SUICIDE SEVERITY RATING SCALE - C-SSRS
2. HAVE YOU ACTUALLY HAD ANY THOUGHTS OF KILLING YOURSELF?: NO
6. HAVE YOU EVER DONE ANYTHING, STARTED TO DO ANYTHING, OR PREPARED TO DO ANYTHING TO END YOUR LIFE?: NO
1. IN THE PAST MONTH, HAVE YOU WISHED YOU WERE DEAD OR WISHED YOU COULD GO TO SLEEP AND NOT WAKE UP?: NO

## 2024-02-23 ASSESSMENT — PAIN SCALES - GENERAL
PAINLEVEL_OUTOF10: 7
PAINLEVEL_OUTOF10: 1
PAINLEVEL_OUTOF10: 0 - NO PAIN
PAINLEVEL_OUTOF10: 0 - NO PAIN
PAINLEVEL_OUTOF10: 1
PAINLEVEL_OUTOF10: 0 - NO PAIN
PAINLEVEL_OUTOF10: 6
PAINLEVEL_OUTOF10: 0 - NO PAIN
PAIN_LEVEL: 3
PAINLEVEL_OUTOF10: 1

## 2024-02-23 ASSESSMENT — PAIN DESCRIPTION - DESCRIPTORS: DESCRIPTORS: ACHING;SHARP

## 2024-02-23 ASSESSMENT — PAIN DESCRIPTION - ORIENTATION: ORIENTATION: LEFT

## 2024-02-23 ASSESSMENT — PAIN DESCRIPTION - LOCATION
LOCATION: SHOULDER
LOCATION: SHOULDER

## 2024-02-23 NOTE — OP NOTE
Anatomic total shoulder replacement (L) Operative Note     Date: 2024  OR Location: [facility]    Name: Monse Aguilera, : 1960, Age: 63 y.o., MRN: [id number], Sex: female    Diagnosis  Pre-op Diagnosis     * Arthritis of left shoulder region [M19.012] Post-op Diagnosis     * Arthritis of left shoulder region [M19.012]     Procedures  Anatomic total shoulder replacement  68986 - TN ARTHROPLASTY GLENOHUMERAL JOINT TOTAL SHOULDER      Surgeons      * Anderson Tenorio - Primary    Resident/Fellow/Other Assistant:  Surgeon(s) and Role:  Alexa Tang    Procedure Summary  Anesthesia: General  ASA: ASA status not filed in the log.  Anesthesia Staff: Anesthesiologist: Anderson Charles MD  Estimated Blood Loss: 100 mL  Intra-op Medications:   Administrations occurring from 0730 to 1030 on 24:   Medication Name Total Dose   vancomycin (Vancocin) vial for injection 1 g   ceFAZolin in dextrose (iso-os) (Ancef) IVPB 2 g 2 g   fentaNYL PF (Sublimaze) injection 50 mcg 50 mcg   midazolam (Versed) injection 2 mg 2 mg              Anesthesia Record               Intraprocedure I/O Totals          Intake    Tranexamic Acid 0.00 mL    The total shown is the total volume documented since Anesthesia Start was filed.    LR infusion 1000.00 mL    Total Intake 1000 mL          Specimen: No specimens collected     Staff:   Circulator: Zachariah Miller, RN  Scrub Person: Loli Haynes; Rachael Bravo RN         Drains and/or Catheters: * None in log *    Tourniquet Times:         Implants:  Implants       Type Name Action Serial No.      Joint GLENOID PIN, TARGETER, 2.8MM, STAINLESS - [id number] Implanted      Implant CEMENT, BONE, SIMPLEX HV FULL DOSE  40 GM - [id number] Implanted      Joint GLENIOD, VAULTLOCK, MEDIUM - [id number] Implanted       Arthrex Implant system biocomposite knotless achilles speedbridge Implanted SEE LOT     Joint ECLIPSE TRUNNION, SLOTTED, TPS CAP, 45MM - SN/A SEE LOT - [id number] Implanted N/A  SEE LOT     Joint SCREW, ECLIPSE CAGE, LARGE, 40MM - SNA SEE LOT - [id number] Implanted NA SEE LOT     Joint HEAD, ECLIPSE HUMERAL, 45/17 - SN/A SEE LOT - [id number] Implanted N/A SEE LOT              Findings: Same    Indications: Monse Aguilera is an 63 y.o. female who is having surgery for left shoulder arthritis.  Plan is to proceed with left shoulder anatomic total shoulder replacement with possible need for reverse total shoulder arthroplasty.    The patient was seen in the preoperative area. The risks, benefits, complications, treatment options, non-operative alternatives, expected recovery and outcomes were discussed with the patient. The possibilities of reaction to medication, pulmonary aspiration, injury to surrounding structures, bleeding, recurrent infection, the need for additional procedures, failure to diagnose a condition, and creating a complication requiring transfusion or operation were discussed with the patient. The patient concurred with the proposed plan, giving informed consent.  The site of surgery was properly noted/marked if necessary per policy. The patient has been actively warmed in preoperative area. Preoperative antibiotics have been ordered and given within 1 hours of incision. Venous thrombosis prophylaxis are not indicated.    Procedure Details:   Patient was taken operating room and administered a preoperative block and general anesthesia.  She was placed in the beachchair position.  She was then prepped and draped in the usual sterile fashion.  Of note the patient had a defibrillator in place prior to draping a magnet was placed over the defibrillator.  This was draped out of the field.  Bony landmarks were identified and marked.  Standard deltopectoral incision was made.  Subcu tissue dissected with the Bovie.  Flaps were developed.  The cephalic vein was identified and retracted laterally with the deltoid.  Small branches were cauterized as needed.  The deltoid was mobilized  around the humeral head.  Brown retractor was inserted.  The clavipectoral fascia was incised.  The exit nerve was palpated and protected throughout the procedure.  The bicipital groove was opened.  There was traced proximally through the rotator interval up to the glenoid humeral joint.  It was then tenodesed to the upper end of the pectoralis tendon with a #2 FiberWire suture.  The remainder of the biceps was tenotomized and removed.  A subscapularis peel was performed.  Stay sutures were placed in subscapularis.  Using a extramedullary cutting guide and anatomic head cut was performed.  Peripheral osteophytes were removed.  Endcap was inserted.  Attention directed towards the glenoid.  Posterior glenoid tractor was inserted.  The subscapularis was released superior medial and inferior with my finger over the axillary nerve.  Peripheral labral tissue and the biceps stump was removed.  Inferior capsule was released with my finger over the axillary nerve.  Anterior glenoid fracture was inserted.  The based on preoperative planning and direct measurement decision was made to use a size medium glenoid.  Using a guide the pin was inserted.  Central reaming was performed.  Central hole was drilled.  Next the guide for peripheral holes were impacted down.  Superior hole was drilled.  The drill was left in place.  The inferior holes was then drilled.  A broach was then inserted to connect the holes.  Second freehand broach was also used.  Medium glenoid trial was inserted and felt to fit well.  At that point the medium vault lock glenoid was prepared with bone from the humeral head on the central peg.  Cement was mixed to doughy consistency was placed into the superior and inferior holes impacted 3 times.  The Bolick glenoid was then impacted down.  Attention was directed towards humerus preparation.  Next a trials were performed flexion of 45 trunnion was made.  The guide was impacted down.  Central reaming was  performed.  Chest piece was inserted.  Guidewire was inserted.  Decision was used a size large cage screw.  The 45 trunnion was then impacted down.  Central cage screw was inserted with excellent purchase.  I at that point drill holes were made in the superior and inferior aspect of the humerus for placement of the subscapularis repair sutures.  The anchors were inserted.  And using a guide the peripheral drill holes for the corkscrew anchors were placed in the greater tuberosity and bicipital groove.  Size reaming was then performed.  It was stated to use a size 45 x 17 humeral head.  This is allowed 40 degrees external rotation.,  50% posterior translation with good pushback, and internal rotation to 60 degrees.  The humeral implant was then impacted down and the shoulder was reduced.  Attention was directed toward subscapularis repair.  The sutures through the superior and inferior holes were passed with a fiber loop suture the subscapularis.  Next a ripstop suture was used to place a medial row.  The ripstop sutures were passed and the subscapularis was tightened down excellent tight repair was obtained.  A #2 FiberWire suture was also tied down in the rotator interval in a figure-of-eight fashion.  The remaining fiber tape sutures were placed through an anchor and 2 of them were 1 from each hole were placed into an anchor which was placed in the superior hole which was tightened down with excellent tension.  Remaining 2 sutures were placed through a Arthrex corkscrew anchor.  They were then placed in the inferior hole and tightened down with excellent purchase.  Excess sutures were cut and removed.  Extensive irrigation was performed.  Vancomycin powder was inserted.  The deltopectoral interval was closed with 0.0 Vicryl sutures.  Subcu tissues closed with 3.0 Vicryl sutures.  Skin was closed with 4-0 Monocryl subcuticular suture with buried knots a wound closure system was applied.  Silver dressing was  applied.  Placed and placed in a DonJoy sling.  She taught procedure well.  There are no complications.  She was taken to recovery in stable condition.  Complications:  None; patient tolerated the procedure well.    Disposition: PACU - hemodynamically stable.  Condition: stable         Additional Details: None    Attending Attestation:     Anderson Tenorio  Phone Number: [phone]

## 2024-02-23 NOTE — ANESTHESIA PROCEDURE NOTES
Peripheral Block    Patient location during procedure: pre-op  Start time: 2/23/2024 7:56 AM  End time: 2/23/2024 8:00 AM  Reason for block: post-op pain management  Staffing  Performed: attending   Authorized by: Anderson Charles MD    Performed by: Anderson Charles MD  Preanesthetic Checklist  Completed: patient identified, IV checked, site marked, risks and benefits discussed, surgical consent, monitors and equipment checked, pre-op evaluation and timeout performed   Timeout performed at: 2/23/2024 7:49 AM  Peripheral Block  Patient position: sitting  Prep: ChloraPrep  Patient monitoring: heart rate, cardiac monitor and continuous pulse ox  Block type: interscalene  Laterality: left  Injection technique: single-shot  Guidance: ultrasound guided  Local infiltration: bupivicaine  Infiltration strength: 0.5 %  Dose: 20 mL  Needle  Needle type: short-bevel   Needle gauge: 22 G  Needle length: 5 cm  Needle localization: ultrasound guidance     image stored in chart  Assessment  Injection assessment: negative aspiration for heme, no paresthesia on injection, incremental injection and local visualized surrounding nerve on ultrasound  Heart rate change: no  Slow fractionated injection: no

## 2024-02-23 NOTE — ANESTHESIA POSTPROCEDURE EVALUATION
Patient: Monse Aguilera    Procedure Summary       Date: 02/23/24 Room / Location: [facility] 02 / [facility]    Anesthesia Start: 0805 Anesthesia Stop: 1122    Procedure: Anatomic total shoulder replacement (Left: Shoulder) Diagnosis:       Arthritis of left shoulder region      (left shoulder arthritis)    Surgeons: Andersno Tenorio MD Responsible Provider: Anderson Charles MD    Anesthesia Type: general, regional ASA Status: 3            Anesthesia Type: No value filed.    Vitals Value Taken Time   BP 95/54 02/23/24 1123   Temp 36 02/23/24 1123   Pulse 62 02/23/24 1122   Resp 20 02/23/24 1122   SpO2 95 % 02/23/24 1122   Vitals shown include unvalidated device data.    Anesthesia Post Evaluation    Patient location during evaluation: bedside  Patient participation: complete - patient participated  Level of consciousness: awake  Pain score: 3  Pain management: adequate  Multimodal analgesia pain management approach  Airway patency: patent  Two or more strategies used to mitigate risk of obstructive sleep apnea  Cardiovascular status: acceptable  Respiratory status: acceptable  Hydration status: acceptable  Postoperative Nausea and Vomiting: none        No notable events documented.

## 2024-02-23 NOTE — PERIOPERATIVE NURSING NOTE
Prior to surgery, we discussed patient silver calcium alginate. Pt states with one of her knee replacements she had a reaction to an unknown silver dressing. Pt states that she tolerated Mepilex AG dressing with previous shoulder replacement. After discussion [facility] patient and Dr. Taylor, it was agreed that patient would have a Mepilex AG dressing appied and she would remove the dressing herself if it becomes itchy.

## 2024-02-23 NOTE — ANESTHESIA PREPROCEDURE EVALUATION
"Patient: Monse Aguilera    Procedure Information       Date/Time: 24    Procedure: Arthroplasty Reverse Shoulder (Left: Shoulder) - Arthrex, Sterile Magent for defibrillator  *Req: Linda, Nahomy, Araceli, Jaky*    [facility]    Surgeons: Anderson Tenorio MD            Visit Vitals  BP (!) 133/100 Comment: will recheck after admission   Pulse 79   Temp 36.3 °C (97.4 °F) (Temporal)   Resp 18   Ht 1.651 m (5' 5\")   Wt 99.8 kg (220 lb)   SpO2 97%   BMI 36.61 kg/m²   OB Status Postmenopausal   Smoking Status Former   BSA 2.14 m²        Current Outpatient Medications   Medication Instructions    acetaminophen (TYLENOL) 1,000 mg, oral, Every 8 hours PRN    atorvastatin (LIPITOR) 40 mg, oral, Daily    buPROPion XL (WELLBUTRIN XL) 300 mg, oral, Every morning    cholecalciferol (VITAMIN D-3) 25 mcg, oral, Daily    levothyroxine (SYNTHROID, LEVOXYL) 175 mcg, oral, Daily before breakfast    losartan (COZAAR) 100 mg, oral, Daily    multivitamin tablet 1 tablet, oral, Daily    naproxen sodium (ALEVE) 440 mg, oral, 2 times daily with meals    propranolol LA (INDERAL LA) 240 mg, oral, Daily, Do not crush, chew, or split.    sertraline (ZOLOFT) 200 mg, oral, Daily    varenicline (CHANTIX) 1 mg, oral, 2 times daily, Take with full glass of water.        Allergies   Allergen Reactions    Adhesive Rash    Other Other    Silver-Calcium Alginate Rash    Sulfa (Sulfonamide Antibiotics) Rash        Past Surgical History:   Procedure Laterality Date    BUNIONECTOMY      CARDIAC DEFIBRILLATOR PLACEMENT Left      SECTION, LOW TRANSVERSE      FL GUIDED INJECTION HIP RIGHT Right 2023    FL GUIDED INJECTION HIP RIGHT 2023 STEFFANIE DIAGRAD    FL GUIDED INJECTION HIP RIGHT  2024    LUMBAR FUSION      LUMBAR LAMINECTOMY      OOPHORECTOMY      OTHER SURGICAL HISTORY      Generator change in ICD    TOTAL KNEE ARTHROPLASTY Bilateral     TOTAL SHOULDER ARTHROPLASTY Right 10/2023    "     Relevant Problems   Cardiovascular   (+) Electrocardiogram abnormal   (+) Essential hypertension   (+) Hyperlipidemia   (+) Long QT syndrome      Endocrine   (+) Hypothyroidism      /Renal   (+) Calculus of kidney      Neuro/Psych   (+) Depressive disorder      Musculoskeletal   (+) Osteoarthritis of right shoulder      Other   (+) Arthritis   (+) Shoulder arthritis       Active Ambulatory Problems     Diagnosis Date Noted    Medical home patient 08/31/2023    Arthritis 08/31/2023    Calculus of kidney 08/31/2023    Chronic pain 08/31/2023    Depressive disorder 08/31/2023    Electrocardiogram abnormal 08/31/2023    Essential hypertension 08/31/2023    Female climacteric 08/31/2023    Hypercalcemia 08/31/2023    Hyperlipidemia 08/31/2023    Hypothyroidism 08/31/2023    Leukocytosis 08/31/2023    Long QT syndrome 08/31/2023    Migraine 08/31/2023    Vitamin D deficiency 08/31/2023    Osteoarthritis of right shoulder 10/11/2023    Arthritis of right shoulder region 10/13/2023    Right shoulder pain 10/24/2023     Resolved Ambulatory Problems     Diagnosis Date Noted    No Resolved Ambulatory Problems     Past Medical History:   Diagnosis Date    Anxiety     Catecholaminergic polymorphic ventricular tachycardia (CMS/HCC)     Depression     Hard to intubate     History of transfusion     Hypertension     Nephrolithiasis        Clinical information reviewed:   Tobacco  Allergies  Meds   Med Hx  Surg Hx  OB Status  Fam Hx  Soc   Hx        NPO Detail:    NPO/Void Status  Carbohydrate Drink Given Prior to Surgery? : N  Date of Last Liquid: 02/23/24  Time of Last Liquid: 0515  Date of Last Solid: 02/22/24  Time of Last Solid: 2300  Last Intake Type: Clear fluids  Time of Last Void: 0627         Physical Exam    Airway  Mallampati: II  TM distance: >3 FB  Neck ROM: full     Cardiovascular - normal exam     Dental - normal exam  Comments: 8 implant   Pulmonary - normal exam     Abdominal            Anesthesia  Plan    History of general anesthesia?: yes  History of complications of general anesthesia?: no    ASA 3     general and regional   (Ett and ISNB, will place magnet over CIED and remove if needed during case for v-fib. Patient states that she has never received a shock from the device, and when the current battery expires, she will not be having a new battery placed. Will remove the magnet at the end of the case.  )  The patient is a current smoker.  Patient was previously instructed to abstain from smoking on day of procedure.  Patient did not smoke on day of procedure.    intravenous induction   Anesthetic plan and risks discussed with patient.

## 2024-02-23 NOTE — DISCHARGE INSTRUCTIONS
Left arm sling.  Okay to remove sling for range of motion exercises of left elbow wrist and hand.  No motion shoulder without PT  Keep bandage clean and dry.  Suggest sleeping in a recliner.  Patient has PT appointment scheduled for outpatient exercises.  Patient has follow-up appointment scheduled for me in 10 days.

## 2024-02-23 NOTE — ANESTHESIA PROCEDURE NOTES
Airway  Date/Time: 2/23/2024 8:17 AM  Urgency: elective    Airway not difficult    Staffing  Performed: attending   Authorized by: Anderson Charles MD    Performed by: Anderson Charles MD  Patient location during procedure: OR    Indications and Patient Condition  Indications for airway management: anesthesia  Spontaneous ventilation: present  Preoxygenated: yes  Patient position: sniffing  MILS maintained throughout  Mask difficulty assessment: 1 - vent by mask    Final Airway Details  Final airway type: endotracheal airway      Successful airway: ETT  Cuffed: yes   Successful intubation technique: direct laryngoscopy  Facilitating devices/methods: intubating stylet  Endotracheal tube insertion site: oral  Blade: Kari  Blade size: #3  ETT size (mm): 7.5  Cormack-Lehane Classification: grade I - full view of glottis  Placement verified by: chest auscultation and capnometry   Measured from: lips  ETT to lips (cm): 23  Number of attempts at approach: 1  Ventilation between attempts: none  Number of other approaches attempted: 0

## 2024-02-23 NOTE — POST-PROCEDURE NOTE
1240- pt brought back from pacu,verbal report received. Pt is alert and awake sitting in chair. Dressing clean, dry and intact. Pt wearing sling at this time as well.     1305- vss. Discharge instructions given and explained to pt. Pt verbally understood. Pt tolerating drink.     1310- pt ambulated to BR with steady gait and assistance of this nurse.     1320- pt getting dressed at this time with assistance of this nurse. IV angiocath removed.    1330- pt brought down to [facility] via wheelchair with this nurse where she was picked up by her .

## 2024-03-04 ENCOUNTER — EVALUATION (OUTPATIENT)
Dept: PHYSICAL THERAPY | Facility: CLINIC | Age: 64
End: 2024-03-04
Payer: MEDICARE

## 2024-03-04 DIAGNOSIS — M25.512 LEFT SHOULDER PAIN: Primary | ICD-10-CM

## 2024-03-04 DIAGNOSIS — M19.012 PRIMARY OSTEOARTHRITIS, LEFT SHOULDER: ICD-10-CM

## 2024-03-04 PROCEDURE — 97140 MANUAL THERAPY 1/> REGIONS: CPT | Mod: GP

## 2024-03-04 PROCEDURE — 97110 THERAPEUTIC EXERCISES: CPT | Mod: GP

## 2024-03-04 PROCEDURE — 97161 PT EVAL LOW COMPLEX 20 MIN: CPT | Mod: GP

## 2024-03-04 NOTE — PROGRESS NOTES
Physical Therapy    Physical Therapy Evaluation and Treatment      Patient Name: Monse Aguilera  MRN: [id number]  Today's Date: 3/4/2024  Time Calculation  Start Time: 1015  Stop Time: 1055  Time Calculation (min): 40 min  Eval: 20 mins  Therex; 10 mins   Manual: 10 mins    Assessment:  PT Assessment  PT Assessment Results: Decreased strength, Decreased range of motion, Decreased endurance, Decreased mobility, Decreased coordination, Orthopedic restrictions, Pain  Rehab Prognosis: Good pt is a 63 year old F who participated in a physical therapy evaluation today due to L TSR. her impairments include; tenderness, strength, pain, swelling, ROM deficits, motor control. Due to these impairments, pt has the following functional limitations OH limitations, reaching, driving, difficulty with sleeping, performing household/recreational/ occupational activities, and performing ADLs. Pt will benefit from continued skilled physical therapy to address above impairments and progress toward therapy related goals.       Plan:  OP PT Plan  Treatment/Interventions: Education/ Instruction, Electrical stimulation, Hot pack, Manual therapy, Neuromuscular re-education, Therapeutic activities, Therapeutic exercises  PT Plan: Skilled PT  PT Frequency: 2 times per week  Duration: 4 weeks  Onset Date: 02/23/24  Certification Period Start Date: 03/04/24  Certification Period End Date: 06/02/24  Number of Treatments Authorized: MN  Rehab Potential: Good  Plan of Care Agreement: Patient    Plan next visit; PROM; elbow, wrist AROM, strengthening     Current Problem:   1. Left shoulder pain  Follow Up In Physical Therapy      2. Primary osteoarthritis, left shoulder  Referral to Physical Therapy          Subjective    RFV: L total shoulder replacement   DOS: 02/23  Pain: 3/10  Better/worse:  Precautions: No lifting, no IR, No excessive ER   Acute/Chronic: Acute  Functional limitations: OH limitation, reaching, UE use   Medication/injections: pain  medication as needed         Objective   PROM:   0-90 Shoulder flexion  0-30 ER     Strength:   RUE: 4+/5  LUE: >3/5     Palpation; Tenderness noted along ant L shoulder, incision healing well no adverse reactions noted       Treatments:  Therex; 3 x 10  Pendulums  Scap retraction  Elbow flexion/extension  Wrist flexion/wrist extension     Manual:   STM over proximal anterior shoulder     EDUCATION:  Outpatient Education  Individual(s) Educated: Patient  Education Provided: Anatomy, Home Exercise Program, Physiology  Risk and Benefits Discussed with Patient/Caregiver/Other: yes  Patient/Caregiver Demonstrated Understanding: yes  Plan of Care Discussed and Agreed Upon: yes  Patient Response to Education: Patient/Caregiver Asked Appropriate Questions, Patient/Caregiver Performed Return Demonstration of Exercises/Activities, Patient/Caregiver Verbalized Understanding of Information    Goals:  Active       PT Problem       Pt will demonstrate 4+/5 B/L UE strength        Start:  03/04/24    Expected End:  06/02/24            Pt will demonstrate symmetrical AROM in B/L UEs        Start:  03/04/24    Expected End:  06/02/24            pt will report no greater than 2/10 pain for 3 consecutive days         Start:  03/04/24    Expected End:  06/02/24            Pt will be indep with HEP         Start:  03/04/24    Expected End:  06/02/24

## 2024-03-15 ENCOUNTER — TREATMENT (OUTPATIENT)
Dept: PHYSICAL THERAPY | Facility: CLINIC | Age: 64
End: 2024-03-15
Payer: MEDICARE

## 2024-03-15 DIAGNOSIS — M25.512 ACUTE PAIN OF LEFT SHOULDER: Primary | ICD-10-CM

## 2024-03-15 DIAGNOSIS — M25.512 LEFT SHOULDER PAIN: ICD-10-CM

## 2024-03-15 PROCEDURE — 97140 MANUAL THERAPY 1/> REGIONS: CPT | Mod: GP,CQ

## 2024-03-15 NOTE — PROGRESS NOTES
Physical Therapy Treatment    Patient Name: Monse Aguilera  MRN: [id number]  Today's Date: 3/15/2024  Time Calculation  Start Time: 0930  Stop Time: 1000  Time Calculation (min): 30 min  PT Therapeutic Procedures Time Entry  Manual Therapy Time Entry: 25  Therapeutic Exercise Time Entry: 5    Insurance:  Visit number: 2 of 8  Authorization info: MEDICARE A/B - NO AUTH / [place] VISITS / $0 USED 2024 PT/ST.  [employer] OF [facility]   Insurance Type: Payor: MEDICARE / Plan: MEDICARE PART A AND B / Product Type: *No Product type* /     Current Problem   1. Acute pain of left shoulder        2. Left shoulder pain  Follow Up In Physical Therapy          Subjective   General    Pt reports that she is doing ok. Tired of her sling.  Precautions:   L TSR  Pain   0-2   Post Treatment Pain Level 0    Objective   PROM in supine :  Flexion = 140  ER =  50  ABD = 120    Treatments:  Therapeutic Exercise:   Scap add    Bicep curls  Self assist flexion in supine    Manual:   PROM in all planes    Assessment   Assessment:   Pt is progressing well.     Plan:    Progress to AAROM/AROM next session.    OP EDUCATION:   Updated HEP    Goals:   Active       PT Problem       Pt will demonstrate 4+/5 B/L UE strength  (Progressing)       Start:  03/04/24    Expected End:  06/02/24            Pt will demonstrate symmetrical AROM in B/L UEs  (Progressing)       Start:  03/04/24    Expected End:  06/02/24            pt will report no greater than 2/10 pain for 3 consecutive days   (Progressing)       Start:  03/04/24    Expected End:  06/02/24            Pt will be indep with HEP   (Progressing)       Start:  03/04/24    Expected End:  06/02/24

## 2024-03-22 ENCOUNTER — TREATMENT (OUTPATIENT)
Dept: PHYSICAL THERAPY | Facility: CLINIC | Age: 64
End: 2024-03-22
Payer: MEDICARE

## 2024-03-22 DIAGNOSIS — G89.29 CHRONIC LEFT SHOULDER PAIN: Primary | ICD-10-CM

## 2024-03-22 DIAGNOSIS — M25.512 CHRONIC LEFT SHOULDER PAIN: Primary | ICD-10-CM

## 2024-03-22 DIAGNOSIS — M25.512 LEFT SHOULDER PAIN: ICD-10-CM

## 2024-03-22 PROCEDURE — 97110 THERAPEUTIC EXERCISES: CPT | Mod: GP,CQ

## 2024-03-22 PROCEDURE — 97140 MANUAL THERAPY 1/> REGIONS: CPT | Mod: GP,CQ

## 2024-03-29 ENCOUNTER — TREATMENT (OUTPATIENT)
Dept: PHYSICAL THERAPY | Facility: CLINIC | Age: 64
End: 2024-03-29
Payer: MEDICARE

## 2024-03-29 DIAGNOSIS — M25.512 CHRONIC LEFT SHOULDER PAIN: Primary | ICD-10-CM

## 2024-03-29 DIAGNOSIS — G89.29 CHRONIC LEFT SHOULDER PAIN: Primary | ICD-10-CM

## 2024-03-29 DIAGNOSIS — M25.512 LEFT SHOULDER PAIN: ICD-10-CM

## 2024-03-29 PROCEDURE — 97110 THERAPEUTIC EXERCISES: CPT | Mod: GP,CQ

## 2024-03-29 PROCEDURE — 97140 MANUAL THERAPY 1/> REGIONS: CPT | Mod: GP,CQ

## 2024-03-29 NOTE — PROGRESS NOTES
Physical Therapy Treatment    Patient Name: Monse Aguilera  MRN: [id number]  Today's Date: 3/29/2024  Time Calculation  Start Time: 0930  Stop Time: 1010  Time Calculation (min): 40 min  PT Therapeutic Procedures Time Entry  Manual Therapy Time Entry: 10  Therapeutic Exercise Time Entry: 30    Insurance:  Visit number: 4 of 8  Authorization info: MEDICARE A/B - NO AUTH / [place] VISITS / $0 USED 2024 PT/ST.  [employer] OF [facility]   Insurance Type: Payor: MEDICARE / Plan: MEDICARE PART A AND B / Product Type: *No Product type* /     Current Problem   1. Chronic left shoulder pain        2. Left shoulder pain  Follow Up In Physical Therapy          Subjective   General    Pt reports some general soreness after vther ex.  Precautions:   L TSR  Pain    0  Post Treatment Pain Level 0    Objective   L shoulder flexion 135 in supine  L shoulder ER 35    Treatments:  Therapeutic Exercise:   UBE 2/2 pulleys for flexion  Pulleys for flexion 2 x 10  YSB rollouts x 10  Supine Flexion 2 x 10  SPO 2 x 10  Supine Hor ABD/ADD to midline 2 x 10  S/L ER 2 x 10  S/L ABD 2 x 10       Manual:  PROM to L shoulder    Assessment   Assessment:    Pt is progressing well per protocol.    Plan:    Continue with POC    OP EDUCATION:   Updated HEP    Goals:   Active       PT Problem       Pt will demonstrate 4+/5 B/L UE strength  (Progressing)       Start:  03/04/24    Expected End:  06/02/24            Pt will demonstrate symmetrical AROM in B/L UEs  (Progressing)       Start:  03/04/24    Expected End:  06/02/24            pt will report no greater than 2/10 pain for 3 consecutive days   (Progressing)       Start:  03/04/24    Expected End:  06/02/24            Pt will be indep with HEP   (Progressing)       Start:  03/04/24    Expected End:  06/02/24

## 2024-04-02 ENCOUNTER — APPOINTMENT (OUTPATIENT)
Dept: PHYSICAL THERAPY | Facility: CLINIC | Age: 64
End: 2024-04-02
Payer: MEDICARE

## 2024-04-05 ENCOUNTER — TREATMENT (OUTPATIENT)
Dept: PHYSICAL THERAPY | Facility: CLINIC | Age: 64
End: 2024-04-05
Payer: MEDICARE

## 2024-04-05 DIAGNOSIS — M25.512 CHRONIC LEFT SHOULDER PAIN: Primary | ICD-10-CM

## 2024-04-05 DIAGNOSIS — G89.29 CHRONIC LEFT SHOULDER PAIN: Primary | ICD-10-CM

## 2024-04-05 DIAGNOSIS — M25.512 LEFT SHOULDER PAIN: ICD-10-CM

## 2024-04-05 PROCEDURE — 97110 THERAPEUTIC EXERCISES: CPT | Mod: GP,CQ

## 2024-04-05 NOTE — PROGRESS NOTES
Physical Therapy Treatment    Patient Name: Monse Aguilera  MRN: [id number]  Today's Date: 4/5/2024  Time Calculation  Start Time: 0930  Stop Time: 1000  Time Calculation (min): 30 min  PT Therapeutic Procedures Time Entry  Manual Therapy Time Entry: 5  Therapeutic Exercise Time Entry: 25    Insurance:  Visit number: 5 of 8  Authorization info: MEDICARE A/B - NO AUTH / [place] VISITS / $0 USED 2024 PT/ST.  [facility] OF [facility]   Insurance Type: Payor: MEDICARE / Plan: MEDICARE PART A AND B / Product Type: *No Product type* /     Current Problem   1. Chronic left shoulder pain        2. Left shoulder pain  Follow Up In Physical Therapy          Subjective   General    Pt has nothing new to report.  Precautions:   L TSR  Pain    0/10  Post Treatment Pain Level 0/10    Objective   L shoulder PROM in supine:  Flexion at 158  ER 50      Treatments:  Therapeutic Exercise:  UBE 2/2   Scap add with GTB x 20  Pulleys for flexion 2 x 10  YSB rollouts x 10  Supine Flexion 2 x 10  SPO 2 x 10  Supine Hor ABD/ADD to midline 2 x 10  S/L ER 2 x 10  S/L ABD 2 x 10     Manual:  PROM to L shoulder      Assessment   Assessment:    Pt is progressing well. Improved PROM.    Plan:    Progress into light strengthening    OP EDUCATION:   Updated HEP    Goals:   Active       PT Problem       Pt will demonstrate 4+/5 B/L UE strength  (Progressing)       Start:  03/04/24    Expected End:  06/02/24            Pt will demonstrate symmetrical AROM in B/L UEs  (Progressing)       Start:  03/04/24    Expected End:  06/02/24            pt will report no greater than 2/10 pain for 3 consecutive days   (Progressing)       Start:  03/04/24    Expected End:  06/02/24            Pt will be indep with HEP   (Progressing)       Start:  03/04/24    Expected End:  06/02/24

## 2024-04-19 ENCOUNTER — TREATMENT (OUTPATIENT)
Dept: PHYSICAL THERAPY | Facility: CLINIC | Age: 64
End: 2024-04-19
Payer: MEDICARE

## 2024-04-19 DIAGNOSIS — M25.512 LEFT SHOULDER PAIN: ICD-10-CM

## 2024-04-19 DIAGNOSIS — G89.29 CHRONIC LEFT SHOULDER PAIN: Primary | ICD-10-CM

## 2024-04-19 DIAGNOSIS — M25.512 CHRONIC LEFT SHOULDER PAIN: Primary | ICD-10-CM

## 2024-04-19 PROCEDURE — 97110 THERAPEUTIC EXERCISES: CPT | Mod: GP,CQ

## 2024-04-19 NOTE — PROGRESS NOTES
Physical Therapy Treatment    Patient Name: Monse Aguilera  MRN: [id number]  Today's Date: 4/19/2024  Time Calculation  Start Time: 0930  Stop Time: 0955  Time Calculation (min): 25 min  PT Therapeutic Procedures Time Entry  Therapeutic Exercise Time Entry: 25    Insurance:  Visit number: 6 of 8  Authorization info: MEDICARE A/B - NO AUTH / [place] VISITS / $0 USED 2024 PT/ST.  [facility] OF [facility] SU[facility]   Insurance Type: Payor: MEDICARE / Plan: MEDICARE PART A AND B / Product Type: *No Product type* /     Current Problem   1. Chronic left shoulder pain        2. Left shoulder pain  Follow Up In Physical Therapy          Subjective   General    Pt claims she fell over a rake and hit her face. Pt in a great deal of R hip pain and would like to make today's visit short. She plans on going over to MD's office and asking if she could move her THR on R sooner.  Precautions:  L TSR   Pain    0/10  Post Treatment Pain Level 0/10    Objective   R shoulder without pain and ROM within the norms of a TSR.    Treatments:  Therapeutic Exercise:   Supine Flexion 2 x 10 #2  SPO 2 x 10 #2  Supine Hor ABD/ADD to midline 2 x 10  S/L ER 2 x 10 #2  S/L ABD 2 x 10 #2    Assessment   Assessment:    Pt has progressed well. Would like to make her next session her last,    Plan:    Reassess next visit.    OP EDUCATION:   Reviewed HEP    Goals:   Active       PT Problem       Pt will demonstrate 4+/5 B/L UE strength  (Progressing)       Start:  03/04/24    Expected End:  06/02/24            Pt will demonstrate symmetrical AROM in B/L UEs  (Progressing)       Start:  03/04/24    Expected End:  06/02/24            pt will report no greater than 2/10 pain for 3 consecutive days   (Progressing)       Start:  03/04/24    Expected End:  06/02/24            Pt will be indep with HEP   (Progressing)       Start:  03/04/24    Expected End:  06/02/24

## 2024-04-25 ENCOUNTER — TREATMENT (OUTPATIENT)
Dept: PHYSICAL THERAPY | Facility: CLINIC | Age: 64
End: 2024-04-25
Payer: MEDICARE

## 2024-04-25 DIAGNOSIS — M25.512 LEFT SHOULDER PAIN: ICD-10-CM

## 2024-04-25 PROCEDURE — 97110 THERAPEUTIC EXERCISES: CPT | Mod: GP

## 2024-04-25 NOTE — PROGRESS NOTES
Physical Therapy Treatment    Patient Name: Monse Aguilera  MRN: [id number]  Today's Date: 4/25/2024  Time Calculation  Start Time: 0930  Stop Time: 0955  Time Calculation (min): 25 min  PT Therapeutic Procedures Time Entry  Therapeutic Exercise Time Entry: 25    Insurance:  Visit number: 7 of 8  Authorization info: MEDICARE A/B - NO AUTH / [place] VISITS / $0 USED 2024 PT/ST.  [employer] OF [facility] SU[employer]   Insurance Type: Payor: MEDICARE / Plan: MEDICARE PART A AND B / Product Type: *No Product type* /     Current Problem   1. Chronic left shoulder pain        2. Left shoulder pain  Follow Up In Physical Therapy          Subjective   General   Pt reports HEP is going well is happy with her overall progress so far. Would like to make today her last visit. Pt is scheduled for THR on 05/20 with Dr. Salgado   Precautions:  L TSR   Pain    0/10  Post Treatment Pain Level 0/10    Objective   LUE: minimal UT compensation, 140 deg forward elevation, 55 deg ER   RUE: WFL    Strength: >3/5 BL UE    Palpation; min tenderness noted along anterior aspect of L shoulder      Treatments:  Therapeutic Exercise:   Reassessment  Review HEP     Assessment   Assessment:   Pt presents to the clinic today for a formal reassessment visit 8/9. Pt is demonstrating significant improvement with respect to strength, AROM, and tolerance to activity. Pt has no pain or functional limitations and this time and has successfully met all of her therapeutic goals. Pt will cont with her HEP at home.  Pt will be D.C from PT at this time.      Plan:    Reassess next visit.    OP EDUCATION:   Reviewed HEP    Goals:   Active       PT Problem       Pt will demonstrate 4+/5 B/L UE strength  (Progressing)       Start:  03/04/24    Expected End:  06/02/24            Pt will demonstrate symmetrical AROM in B/L UEs  (Progressing)       Start:  03/04/24    Expected End:  06/02/24            pt will report no greater than 2/10 pain for 3 consecutive days   (Progressing)        Start:  03/04/24    Expected End:  06/02/24            Pt will be indep with HEP   (Progressing)       Start:  03/04/24    Expected End:  06/02/24

## 2024-05-02 ENCOUNTER — DOCUMENTATION (OUTPATIENT)
Dept: PHYSICAL THERAPY | Facility: CLINIC | Age: 64
End: 2024-05-02
Payer: MEDICARE

## 2024-05-14 ENCOUNTER — PRE-ADMISSION TESTING (OUTPATIENT)
Dept: PREADMISSION TESTING | Facility: HOSPITAL | Age: 64
End: 2024-05-14
Payer: MEDICARE

## 2024-05-14 ENCOUNTER — LAB (OUTPATIENT)
Dept: LAB | Facility: LAB | Age: 64
End: 2024-05-14
Payer: MEDICARE

## 2024-05-14 ENCOUNTER — OFFICE VISIT (OUTPATIENT)
Dept: PRIMARY CARE | Facility: CLINIC | Age: 64
End: 2024-05-14
Payer: MEDICARE

## 2024-05-14 VITALS
OXYGEN SATURATION: 99 % | WEIGHT: 221.8 LBS | DIASTOLIC BLOOD PRESSURE: 82 MMHG | HEIGHT: 65 IN | HEART RATE: 67 BPM | SYSTOLIC BLOOD PRESSURE: 118 MMHG | BODY MASS INDEX: 36.96 KG/M2

## 2024-05-14 VITALS
HEIGHT: 65 IN | BODY MASS INDEX: 36.49 KG/M2 | OXYGEN SATURATION: 98 % | SYSTOLIC BLOOD PRESSURE: 118 MMHG | DIASTOLIC BLOOD PRESSURE: 82 MMHG | WEIGHT: 219 LBS | HEART RATE: 67 BPM

## 2024-05-14 DIAGNOSIS — Z12.11 ENCOUNTER FOR SCREENING FOR MALIGNANT NEOPLASM OF COLON: ICD-10-CM

## 2024-05-14 DIAGNOSIS — I47.29: ICD-10-CM

## 2024-05-14 DIAGNOSIS — I45.81 LONG QT SYNDROME: ICD-10-CM

## 2024-05-14 DIAGNOSIS — I10 ESSENTIAL HYPERTENSION: ICD-10-CM

## 2024-05-14 DIAGNOSIS — F32.A DEPRESSIVE DISORDER: ICD-10-CM

## 2024-05-14 DIAGNOSIS — Z01.818 PREOP TESTING: ICD-10-CM

## 2024-05-14 DIAGNOSIS — E78.2 MIXED HYPERLIPIDEMIA: ICD-10-CM

## 2024-05-14 DIAGNOSIS — Z01.818 ENCOUNTER FOR PREADMISSION TESTING: Primary | ICD-10-CM

## 2024-05-14 DIAGNOSIS — Z01.818 PREOP TESTING: Primary | ICD-10-CM

## 2024-05-14 DIAGNOSIS — E66.01 OBESITY, MORBID (MULTI): ICD-10-CM

## 2024-05-14 DIAGNOSIS — G43.E19 INTRACTABLE CHRONIC MIGRAINE WITH AURA AND WITHOUT STATUS MIGRAINOSUS: ICD-10-CM

## 2024-05-14 DIAGNOSIS — F19.21 HISTORY OF SUBSTANCE DEPENDENCE (MULTI): ICD-10-CM

## 2024-05-14 DIAGNOSIS — Z72.0 TOBACCO USE: ICD-10-CM

## 2024-05-14 LAB
ABO GROUP (TYPE) IN BLOOD: NORMAL
ANTIBODY SCREEN: NORMAL
APPEARANCE UR: ABNORMAL
BACTERIA #/AREA URNS AUTO: ABNORMAL /HPF
BASOPHILS # BLD AUTO: 0.07 X10*3/UL (ref 0–0.1)
BASOPHILS NFR BLD AUTO: 0.8 %
BILIRUB UR STRIP.AUTO-MCNC: NEGATIVE MG/DL
COLOR UR: ABNORMAL
EOSINOPHIL # BLD AUTO: 0.26 X10*3/UL (ref 0–0.7)
EOSINOPHIL NFR BLD AUTO: 2.8 %
ERYTHROCYTE [DISTWIDTH] IN BLOOD BY AUTOMATED COUNT: 14 % (ref 11.5–14.5)
GLUCOSE UR STRIP.AUTO-MCNC: NORMAL MG/DL
HCT VFR BLD AUTO: 43.8 % (ref 36–46)
HGB BLD-MCNC: 14.2 G/DL (ref 12–16)
HOLD SPECIMEN: NORMAL
IMM GRANULOCYTES # BLD AUTO: 0.03 X10*3/UL (ref 0–0.7)
IMM GRANULOCYTES NFR BLD AUTO: 0.3 % (ref 0–0.9)
KETONES UR STRIP.AUTO-MCNC: NEGATIVE MG/DL
LEUKOCYTE ESTERASE UR QL STRIP.AUTO: ABNORMAL
LYMPHOCYTES # BLD AUTO: 2.41 X10*3/UL (ref 1.2–4.8)
LYMPHOCYTES NFR BLD AUTO: 26.3 %
MCH RBC QN AUTO: 28.6 PG (ref 26–34)
MCHC RBC AUTO-ENTMCNC: 32.4 G/DL (ref 32–36)
MCV RBC AUTO: 88 FL (ref 80–100)
MONOCYTES # BLD AUTO: 0.62 X10*3/UL (ref 0.1–1)
MONOCYTES NFR BLD AUTO: 6.8 %
MUCOUS THREADS #/AREA URNS AUTO: ABNORMAL /LPF
NEUTROPHILS # BLD AUTO: 5.78 X10*3/UL (ref 1.2–7.7)
NEUTROPHILS NFR BLD AUTO: 63 %
NITRITE UR QL STRIP.AUTO: NEGATIVE
NRBC BLD-RTO: 0 /100 WBCS (ref 0–0)
PH UR STRIP.AUTO: 5.5 [PH]
PLATELET # BLD AUTO: 270 X10*3/UL (ref 150–450)
PROT UR STRIP.AUTO-MCNC: ABNORMAL MG/DL
RBC # BLD AUTO: 4.97 X10*6/UL (ref 4–5.2)
RBC # UR STRIP.AUTO: NEGATIVE /UL
RBC #/AREA URNS AUTO: ABNORMAL /HPF
RH FACTOR (ANTIGEN D): NORMAL
SP GR UR STRIP.AUTO: 1.02
SQUAMOUS #/AREA URNS AUTO: ABNORMAL /HPF
TRANS CELLS #/AREA UR COMP ASSIST: ABNORMAL /HPF
UROBILINOGEN UR STRIP.AUTO-MCNC: NORMAL MG/DL
WBC # BLD AUTO: 9.2 X10*3/UL (ref 4.4–11.3)
WBC #/AREA URNS AUTO: ABNORMAL /HPF

## 2024-05-14 PROCEDURE — 1159F MED LIST DOCD IN RCRD: CPT

## 2024-05-14 PROCEDURE — 81001 URINALYSIS AUTO W/SCOPE: CPT

## 2024-05-14 PROCEDURE — 86850 RBC ANTIBODY SCREEN: CPT

## 2024-05-14 PROCEDURE — 99214 OFFICE O/P EST MOD 30 MIN: CPT

## 2024-05-14 PROCEDURE — 87086 URINE CULTURE/COLONY COUNT: CPT

## 2024-05-14 PROCEDURE — 3074F SYST BP LT 130 MM HG: CPT

## 2024-05-14 PROCEDURE — 93010 ELECTROCARDIOGRAM REPORT: CPT

## 2024-05-14 PROCEDURE — 99497 ADVNCD CARE PLAN 30 MIN: CPT

## 2024-05-14 PROCEDURE — 85025 COMPLETE CBC W/AUTO DIFF WBC: CPT

## 2024-05-14 PROCEDURE — 1160F RVW MEDS BY RX/DR IN RCRD: CPT

## 2024-05-14 PROCEDURE — 3079F DIAST BP 80-89 MM HG: CPT

## 2024-05-14 PROCEDURE — 1125F AMNT PAIN NOTED PAIN PRSNT: CPT

## 2024-05-14 PROCEDURE — 1124F ACP DISCUSS-NO DSCNMKR DOCD: CPT

## 2024-05-14 PROCEDURE — 4004F PT TOBACCO SCREEN RCVD TLK: CPT

## 2024-05-14 PROCEDURE — 1157F ADVNC CARE PLAN IN RCRD: CPT

## 2024-05-14 PROCEDURE — 36415 COLL VENOUS BLD VENIPUNCTURE: CPT

## 2024-05-14 PROCEDURE — 87081 CULTURE SCREEN ONLY: CPT | Mod: WESLAB | Performed by: NURSE PRACTITIONER

## 2024-05-14 PROCEDURE — 86901 BLOOD TYPING SEROLOGIC RH(D): CPT

## 2024-05-14 PROCEDURE — 93005 ELECTROCARDIOGRAM TRACING: CPT

## 2024-05-14 PROCEDURE — 86900 BLOOD TYPING SEROLOGIC ABO: CPT

## 2024-05-14 RX ORDER — PROPRANOLOL HYDROCHLORIDE 120 MG/1
240 CAPSULE, EXTENDED RELEASE ORAL DAILY
Qty: 180 CAPSULE | Refills: 1 | Status: SHIPPED | OUTPATIENT
Start: 2024-05-14 | End: 2024-11-10

## 2024-05-14 RX ORDER — BUPROPION HYDROCHLORIDE 300 MG/1
300 TABLET ORAL EVERY MORNING
Qty: 90 TABLET | Refills: 1 | Status: SHIPPED | OUTPATIENT
Start: 2024-05-14 | End: 2024-11-10

## 2024-05-14 ASSESSMENT — DUKE ACTIVITY SCORE INDEX (DASI)
CAN YOU WALK A BLOCK OR TWO ON LEVEL GROUND: NO
CAN YOU DO LIGHT WORK AROUND THE HOUSE LIKE DUSTING OR WASHING DISHES: YES
CAN YOU RUN A SHORT DISTANCE: YES
CAN YOU HAVE SEXUAL RELATIONS: YES
CAN YOU DO MODERATE WORK AROUND THE HOUSE LIKE VACUUMING, SWEEPING FLOORS OR CARRYING GROCERIES: YES
CAN YOU WALK INDOORS, SUCH AS AROUND YOUR HOUSE: YES
CAN YOU CLIMB A FLIGHT OF STAIRS OR WALK UP A HILL: YES
CAN YOU TAKE CARE OF YOURSELF (EAT, DRESS, BATHE, OR USE TOILET): YES
CAN YOU DO YARD WORK LIKE RAKING LEAVES, WEEDING OR PUSHING A MOWER: YES
CAN YOU PARTICIPATE IN MODERATE RECREATIONAL ACTIVITIES LIKE GOLF, BOWLING, DANCING, DOUBLES TENNIS OR THROWING A BASEBALL OR FOOTBALL: NO
CAN YOU DO HEAVY WORK AROUND THE HOUSE LIKE SCRUBBING FLOORS OR LIFTING AND MOVING HEAVY FURNITURE: NO
DASI METS SCORE: 6.9
CAN YOU PARTICIPATE IN STRENOUS SPORTS LIKE SWIMMING, SINGLES TENNIS, FOOTBALL, BASKETBALL, OR SKIING: NO
TOTAL_SCORE: 33.95

## 2024-05-14 ASSESSMENT — ENCOUNTER SYMPTOMS
ACTIVITY CHANGE: 1
GASTROINTESTINAL NEGATIVE: 1
NEUROLOGICAL NEGATIVE: 1
HEMATOLOGIC/LYMPHATIC NEGATIVE: 1
RESPIRATORY NEGATIVE: 1
CARDIOVASCULAR NEGATIVE: 1
LOSS OF SENSATION IN FEET: 0
ENDOCRINE NEGATIVE: 1
EYES NEGATIVE: 1
OCCASIONAL FEELINGS OF UNSTEADINESS: 0
PSYCHIATRIC NEGATIVE: 1
DEPRESSION: 0

## 2024-05-14 ASSESSMENT — PAIN - FUNCTIONAL ASSESSMENT: PAIN_FUNCTIONAL_ASSESSMENT: 0-10

## 2024-05-14 ASSESSMENT — COLUMBIA-SUICIDE SEVERITY RATING SCALE - C-SSRS
6. HAVE YOU EVER DONE ANYTHING, STARTED TO DO ANYTHING, OR PREPARED TO DO ANYTHING TO END YOUR LIFE?: NO
1. IN THE PAST MONTH, HAVE YOU WISHED YOU WERE DEAD OR WISHED YOU COULD GO TO SLEEP AND NOT WAKE UP?: NO
2. HAVE YOU ACTUALLY HAD ANY THOUGHTS OF KILLING YOURSELF?: NO

## 2024-05-14 ASSESSMENT — PATIENT HEALTH QUESTIONNAIRE - PHQ9
2. FEELING DOWN, DEPRESSED OR HOPELESS: NOT AT ALL
1. LITTLE INTEREST OR PLEASURE IN DOING THINGS: NOT AT ALL
SUM OF ALL RESPONSES TO PHQ9 QUESTIONS 1 AND 2: 0

## 2024-05-14 ASSESSMENT — PAIN SCALES - GENERAL
PAINLEVEL: 8
PAINLEVEL_OUTOF10: 8

## 2024-05-14 ASSESSMENT — PAIN DESCRIPTION - DESCRIPTORS: DESCRIPTORS: ACHING

## 2024-05-14 NOTE — CPM/PAT H&P
CPM/PAT Evaluation       Name: Monse Aguilera (Monse Aguilera)  /Age: 1960/63 y.o.     In-Person       Chief Complaint: right hip pain     HPI    Pt is a 63 year old female with right hip osteoarthritis. Pt reports she has had right hip pain since 2023. Pt stated the pain has progressively worsened.  She describes the pain as a constant pain that worsens with walking and weightbearing. When the hip pain is aggravated it radiates to her right knee. Pt tried cortisone injections in her right hip but states the last one she received failed to treat her pain. Pt has been managing the pain with Aleve and Tylenol. Her right hip feels unstable at times and caused her to fall a few weeks ago. Pt stated she now ambulates with either a cane or a walker. Pt was examined by her surgeon and has been scheduled for right total hip arthroplasty. Pt denies CP, SOB, or dizziness.     Past Medical History:   Diagnosis Date    AICD (automatic cardioverter/defibrillator) present     CPVT    Anxiety     Arrhythmia     CPVT.   Found on genetic testing after 14y/o  Son’s death; unwitnessed cardiac arrest.  **ICD implanted.    Arthritis     Catecholaminergic polymorphic ventricular tachycardia (Multi)     Chronic pain disorder     Lumbar Stenosis    Depression     Hard to intubate     History of transfusion     Hyperlipidemia     Hypertension     Hypothyroidism     Joint pain     Degenerative Arthritis    Migraine     Nephrolithiasis     Spinal stenosis 2004    ongoing pain       Past Surgical History:   Procedure Laterality Date    BACK SURGERY      BUNIONECTOMY      CARDIAC DEFIBRILLATOR PLACEMENT Left      SECTION, LOW TRANSVERSE      ENDOMETRIAL ABLATION      FL GUIDED INJECTION HIP RIGHT Right 2023    FL GUIDED INJECTION HIP RIGHT 2023 [facility]    FL GUIDED INJECTION HIP RIGHT  2024    INSERT / REPLACE / REMOVE PACEMAKER      ICD placement,  Not Pacemaker    JOINT  REPLACEMENT  ,  &     Bilat Knees/ Right Shoulder    LUMBAR FUSION      LUMBAR LAMINECTOMY      OOPHORECTOMY      OTHER SURGICAL HISTORY      Generator change in ICD    SPINAL FUSION  2014    L1-L5 rods/screws    TOTAL KNEE ARTHROPLASTY Bilateral     TOTAL SHOULDER ARTHROPLASTY Right 10/2023    TUBAL LIGATION       Social History     Tobacco Use    Smoking status: Every Day     Current packs/day: 0.00     Average packs/day: 1 pack/day for 51.0 years (51.0 ttl pk-yrs)     Types: Cigarettes     Start date:      Last attempt to quit: 2024     Years since quittin.2     Passive exposure: Current    Smokeless tobacco: Former    Tobacco comments:     currently on Chantix…..Discontinued   Substance Use Topics    Alcohol use: Yes     Alcohol/week: 4.0 - 5.0 standard drinks of alcohol     Types: 4 - 5 Standard drinks or equivalent per week     Comment: monthly     Social History     Substance and Sexual Activity   Drug Use Never     Patient Sexual activity questions deferred to the physician.    Family History   Problem Relation Name Age of Onset    Other (elevated lipids) Mother Mom     Rheum arthritis Mother Mom     Diabetes Mother Mom     Hypertension Mother Mom     Other (Elevated lipids) Father Dad     Coronary artery disease Father Dad     Kidney failure Father Dad     Hypertension Father Dad     Diabetes Father Dad     Heart disease Father Dad     Arthritis Father Dad     Hyperlipidemia Father Dad     Kidney disease Father Dad     Other (thyroid problems) Sister      Other (Cardiac arrest) Son      Hyperlipidemia Sibling         Allergies   Allergen Reactions    Adhesive Rash    Other Other    Silver-Calcium Alginate Rash    Sulfa (Sulfonamide Antibiotics) Rash     Current Outpatient Medications   Medication Sig Dispense Refill    acetaminophen (Tylenol) 500 mg tablet Take 2 tablets (1,000 mg) by mouth every 8 hours if needed for mild pain (1 - 3).      atorvastatin (Lipitor) 40 mg  "tablet Take 1 tablet (40 mg) by mouth once daily. 90 tablet 1    buPROPion XL (Wellbutrin XL) 300 mg 24 hr tablet Take 1 tablet (300 mg) by mouth once daily in the morning. 90 tablet 1    cholecalciferol (Vitamin D-3) 25 MCG (1000 UT) capsule Take 1 capsule (25 mcg) by mouth once daily.      levothyroxine (Synthroid, Levoxyl) 175 mcg tablet Take 1 tablet (175 mcg) by mouth once daily in the morning. Take before meals. 90 tablet 1    losartan (Cozaar) 100 mg tablet Take 1 tablet (100 mg) by mouth once daily.      multivitamin tablet Take 1 tablet by mouth once daily.      naproxen sodium (Aleve) 220 mg tablet Take 2 tablets (440 mg) by mouth 2 times a day with meals.      propranolol LA (Inderal LA) 120 mg 24 hr capsule Take 2 capsules (240 mg) by mouth once daily. Do not crush, chew, or split. 180 capsule 1    sertraline (Zoloft) 100 mg tablet Take 2 tablets (200 mg) by mouth once daily. 180 tablet 1     No current facility-administered medications for this visit.     Review of Systems   Constitutional:  Positive for activity change.   HENT: Negative.     Eyes: Negative.    Respiratory: Negative.     Cardiovascular: Negative.    Gastrointestinal: Negative.    Endocrine: Negative.    Genitourinary: Negative.    Musculoskeletal:         Right hip pain. Walks with a cane   Skin: Negative.    Neurological: Negative.    Hematological: Negative.    Psychiatric/Behavioral: Negative.       /82   Pulse 67   Ht 1.651 m (5' 5\")   Wt 101 kg (221 lb 12.8 oz)   SpO2 99%   BMI 36.91 kg/m²     Physical Exam  Constitutional:       Appearance: She is obese.   HENT:      Head: Normocephalic and atraumatic.      Nose: Nose normal.      Mouth/Throat:      Mouth: Mucous membranes are moist.      Pharynx: Oropharynx is clear.   Eyes:      Extraocular Movements: Extraocular movements intact.      Conjunctiva/sclera: Conjunctivae normal.      Pupils: Pupils are equal, round, and reactive to light.   Cardiovascular:      Rate and " Rhythm: Normal rate and regular rhythm.      Pulses: Normal pulses.      Heart sounds: Normal heart sounds.   Pulmonary:      Effort: Pulmonary effort is normal.      Breath sounds: Normal breath sounds.   Abdominal:      General: Bowel sounds are normal.      Palpations: Abdomen is soft.   Musculoskeletal:      Cervical back: Normal range of motion and neck supple.      Comments: Right hip pain with weightbearing; Pt ambulates with a cane.    Skin:     General: Skin is warm and dry.   Neurological:      General: No focal deficit present.      Mental Status: She is alert and oriented to person, place, and time. Mental status is at baseline.   Psychiatric:         Mood and Affect: Mood normal.         Behavior: Behavior normal.         Thought Content: Thought content normal.         Judgment: Judgment normal.        PAT AIRWAY:   Airway:     Mallampati::  II    TM distance::  >3 FB    Neck ROM::  Full  normal        ASA:3  DASI:  33.95  METS: 6.9  CHADS: 2.8%  RCRI: 0.9%  STOP BAN    Assessment and Plan:     Primary osteoarthritis of right hip: arthroplasty total hip right.  Catecholaminergic polymorphic ventricular tachycardia: ICD placed in  with generator replacement in . Pt reports the last time the battery was checked was in  and per pt the report showed 5 years of battery life remaining. Per pt the ICD has never went off. Pt used to see Dr Finnegan. Pt reports when the ICD device battery runs out she does not wish to have a new generator placed. Pt reports the device was placed d/t one of her children being diagnosed with the problem and finding out that the genetic condition was passed from her to her children.   HTN: pt is taking losartan.  Migraines:  pt is taking propranolol.  Hypothyroidism: 10/3/2023: TSH: 1.65  Daily cigarette smoker.   BMI: 36.91    EKG completed today at PCP's office scanned into [employer].  CBC and T&S completed in PAT.  BMP completed on 2024.    Angela VALLE  Fidel, APRN-CNP    Addendum:  Updated Dr Charles that the patient has an ICD and will not have a battery check for day of surgery d/t patient refusing battery check.

## 2024-05-14 NOTE — PREPROCEDURE INSTRUCTIONS
Medication List            Accurate as of May 14, 2024 10:27 AM. Always use your most recent med list.                acetaminophen 500 mg tablet  Commonly known as: Tylenol  Medication Adjustments for Surgery: Other (Comment)  Notes to patient: CAN TAKE IF NEEDED     Aleve 220 mg tablet  Generic drug: naproxen sodium  Medication Adjustments for Surgery: Stop 7 days before surgery     atorvastatin 40 mg tablet  Commonly known as: Lipitor  Take 1 tablet (40 mg) by mouth once daily.  Medication Adjustments for Surgery: Take morning of surgery with sip of water, no other fluids     buPROPion  mg 24 hr tablet  Commonly known as: Wellbutrin XL  Take 1 tablet (300 mg) by mouth once daily in the morning.  Medication Adjustments for Surgery: Take morning of surgery with sip of water, no other fluids     cholecalciferol 25 MCG (1000 UT) capsule  Commonly known as: Vitamin D-3  Medication Adjustments for Surgery: Stop 7 days before surgery     levothyroxine 175 mcg tablet  Commonly known as: Synthroid, Levoxyl  Take 1 tablet (175 mcg) by mouth once daily in the morning. Take before meals.  Medication Adjustments for Surgery: Take morning of surgery with sip of water, no other fluids     losartan 100 mg tablet  Commonly known as: Cozaar  Medication Adjustments for Surgery: Other (Comment)  Notes to patient: HOLD THE MORNING OF SURGERY     multivitamin tablet  Medication Adjustments for Surgery: Stop 7 days before surgery     propranolol  mg 24 hr capsule  Commonly known as: Inderal LA  Take 2 capsules (240 mg) by mouth once daily. Do not crush, chew, or split.  Medication Adjustments for Surgery: Take morning of surgery with sip of water, no other fluids     sertraline 100 mg tablet  Commonly known as: Zoloft  Take 2 tablets (200 mg) by mouth once daily.  Medication Adjustments for Surgery: Take morning of surgery with sip of water, no other fluids            Preoperative Fasting Guidelines    Why must I stop  eating and drinking near surgery time?  With sedation, food or liquid in your stomach can enter your lungs causing serious complications  Increases nausea and vomiting    When do I need to stop eating and drinking before my surgery?  Do not eat any food or drink any liquids after midnight the night before your surgery/procedure.  You may have sips of water to take medications.    PAT DISCHARGE INSTRUCTIONS    Please call the Same Day Surgery (SDS) Department of the hospital where your procedure will be performed after 2:00 PM the day before your surgery. If you are scheduled on a Monday, or a Tuesday following a Monday holiday, you will need to call on the last business day prior to your surgery.    [facility]  [address], [address] [phone]  [facility]  [address] [place] [address] [phone]  [facility]  [address]  [place] [id number] [phone]    Please let your surgeon know if:      You develop any open sores, shingles, burning or painful urination as these may increase your risk of an infection.   You no longer wish to have the surgery.   Any other personal circumstances change that may lead to the need to cancel or defer this surgery-such as being sick or getting admitted to any hospital within one week of your planned procedure.    Your contact details change, such as a change of address or phone number.    Starting now:     Please DO NOT drink alcohol or smoke for 24 hours before surgery. It is well known that quitting smoking can make a huge difference to your health and recovery from surgery. The longer you abstain from smoking, the better your chances of a healthy recovery. If you need help with quitting, call 2-800-QUIT-NOW to be connected to a trained counselor who will discuss the best methods to help you quit.      Before your surgery:    Please stop all supplements 7 days prior to surgery. Or as directed by your surgeon.   Please stop taking NSAID pain medicine such as Advil and Motrin 7 days before surgery.    If you develop any fever, cough, cold, rashes, cuts, scratches, scrapes, urinary symptoms or infection anywhere on your body (including teeth and gums) prior to surgery, please call your surgeon’s office as soon as possible. This may require treatment to reduce the chance of cancellation on the day of surgery.    The day before your surgery:   DIET- Please follow the diet instructions at the top of your packet.   Get a good night’s rest.  Use the special soap for bathing if you have been instructed to use one.    Scheduled surgery times may change and you will be notified if this occurs - please check your personal voicemail for any updates.     On the morning of surgery:   Wear comfortable, loose fitting clothes which open in the front. Please do not wear moisturizers, creams, lotions, makeup or perfume.    Please bring with you to surgery:   Photo ID and insurance card   Current list of medicines and allergies   Pacemaker/ Defibrillator/Heart stent cards   CPAP machine and mask    Slings/ splints/ crutches   A copy of your complete advanced directive/DHPOA.    Please do NOT bring with you to surgery:   All jewelry and valuables should be left at home.   Prosthetic devices such as contact lenses, hearing aids, dentures, eyelash extensions, hairpins and body piercings must be removed prior to going in to the surgical suite.    After outpatient surgery:   A responsible adult MUST accompany you at the time of discharge and stay with you for 24 hours after your surgery. You may NOT drive yourself home after surgery.    Do not drive, operate machinery, make critical decisions or do activities that require co-ordination or balance until after a night’s sleep.   Do not drink alcoholic beverages for 24 hours.    Instructions for resuming your medications will be provided by your surgeon.    CALL YOUR DOCTOR AFTER SURGERY IF YOU HAVE:     Chills and/or a fever of 101° F or higher.    Redness, swelling, pus or drainage from your surgical wound or a bad smell from the wound.    Lightheadedness, fainting or confusion.    Persistent vomiting (throwing up) and are not able to eat or drink for 12 hours.    Three or more loose, watery bowel movements in 24 hours (diarrhea).   Difficulty or pain while urinating( after non-urological surgery)    Pain and swelling in your legs, especially if it is only on one side.    Difficulty breathing or are breathing faster than normal.    Any new concerning symptoms.      Patient Information: Pre-Operative Infection Prevention Measures     Why did I have my nose, under my arms, and groin swabbed?  The purpose of the swab is to identify Staphylococcus aureus inside your nose or on your skin.  The swab was sent to the laboratory for culture.  A positive swab/culture for Staphylococcus aureus is called colonization or carriage.      What is Staphylococcus aureus?  Staphylococcus aureus, also known as “staph”, is a germ found on the skin or in the nose of healthy people.  Sometimes Staphylococcus aureus can get into the body and cause an infection.  This can be minor (such as pimples, boils, or other skin problems).  It might also be serious (such as a blood infection, pneumonia, or a surgical site infection).    What is Staphylococcus aureus colonization or carriage?  Colonization or carriage means that a person has the germ but is not sick from it.  These bacteria can be spread on the hands or when breathing or sneezing.    How is Staphylococcus aureus spread?  It is most often spread by close contact with a person or item that carries it.    What happens if my culture is positive for Staphylococcus aureus?  Your doctor/medical team will use this information to guide any antibiotic treatment which  may be necessary.  Regardless of the culture results, we will clean the inside of your nose with a betadine swab just before you have your surgery.      Will I get an infection if I have Staphylococcus aureus in my nose or on my skin?  Anyone can get an infection with Staphylococcus aureus.  However, the best way to reduce your risk of infection is to follow the instructions provided to you for the use of your CHG soap and dental rinse.        Patient Information: Oral/Dental Rinse    What is oral/dental rinse?   It is a mouthwash. It is a way of cleaning the mouth with a germ-killing solution before your surgery.  The solution contains chlorhexidine, commonly known as CHG.   It is used inside the mouth to kill a bacteria known as Staphylococcus aureus.  Let your doctor know if you are allergic to Chlorhexidine.    Why do I need to use CHG oral/dental rinse?  The CHG oral/dental rinse helps to kill a bacteria in your mouth known as Staphylococcus aureus.     This reduces the risk of infection at the surgical site.      Using your CHG oral/dental rinse  STEPS:  Use your CHG oral/dental rinse after you brush your teeth the night before (at bedtime) and the morning of your surgery.  Follow all directions on your prescription label.    Use the cap on the container to measure 15ml   Swish (gargle if you can) the mouthwash in your mouth for at least 30 seconds, (do not swallow) and spit out  After you use your CHG rinse, do not rinse your mouth with water, drink or eat.  Please refer to the prescription label for the appropriate time to resume oral intake      What side effects might I have using the CHG oral/dental rinse?  CHG rinse will stick to plaque on the teeth.  Brush and floss just before use.  Teeth brushing will help avoid staining of plaque during use.      Patient Information: Home Preoperative Antibacterial Shower      What is a home preoperative antibacterial shower?  This shower is a way of cleaning the  skin with a germ-killing solution before surgery.  The solution contains chlorhexidine, commonly known as CHG.  CHG is a skin cleanser with germ-killing ability.  Let your doctor know if you are allergic to chlorhexidine.    Why do I need to take a preoperative antibacterial shower?  Skin is not sterile.  It is best to try to make your skin as free of germs as possible before surgery.  Proper cleansing with a germ-killing soap before surgery can lower the number of germs on your skin.  This helps to reduce the risk of infection at the surgical site.  Following the instructions listed below will help you prepare your skin for surgery.      How do I use the solution?  Steps:  Begin using your CHG soap 5 days before your scheduled surgery on ____START WASH ON_5/16/24___________________.    First, wash and rinse your hair using the CHG soap. Keep CHG soap away from ear canals and eyes.  Rinse completely, do not condition.  Hair extensions should be removed.  Wash your face with your normal soap and rinse.    Apply the CHG solution to a clean wet washcloth.  Turn the water off or move away from the water spray to avoid premature rinsing of the CHG soap as you are applying.   Firmly lather your entire body from the neck down.  Do not use on your face.  Pay special attention to the area(s) where your incision(s) will be located unless they are on your face.  Avoid scrubbing your skin too hard.  The important point is to have the CHG soap sit on your skin for 3 minutes.    When the 3 minutes are up, turn on the water and rinse the CHG solution off your body completely.   DO NOT wash with regular soap after you have used the CHG soap solution  Pat yourself dry with a clean, freshly-laundered towel.  DO NOT apply powders, deodorants, or lotions.  Dress in clean, freshly laundered nightclothes.    Be sure to sleep with clean, freshly laundered sheets.  Be aware that CHG will cause stains on fabrics; if you wash them with  bleach after use.  Rinse your washcloth and other linens that have contact with CHG completely.  Use only non-chlorine detergents to launder the items used.   The morning of surgery is the fifth day.  Repeat the above steps and dress in clean comfortable clothing     Whom should I contact if I have any questions regarding the use of CHG soap?  Call the [facility] at [phone] if you have any questions.

## 2024-05-14 NOTE — H&P (VIEW-ONLY)
CPM/PAT Evaluation       Name: Monse Aguilera (Monse Aguilera)  /Age: 1960/63 y.o.     In-Person       Chief Complaint: right hip pain     HPI    Pt is a 63 year old female with right hip osteoarthritis. Pt reports she has had right hip pain since 2023. Pt stated the pain has progressively worsened.  She describes the pain as a constant pain that worsens with walking and weightbearing. When the hip pain is aggravated it radiates to her right knee. Pt tried cortisone injections in her right hip but states the last one she received failed to treat her pain. Pt has been managing the pain with Aleve and Tylenol. Her right hip feels unstable at times and caused her to fall a few weeks ago. Pt stated she now ambulates with either a cane or a walker. Pt was examined by her surgeon and has been scheduled for right total hip arthroplasty. Pt denies CP, SOB, or dizziness.     Past Medical History:   Diagnosis Date    AICD (automatic cardioverter/defibrillator) present     CPVT    Anxiety     Arrhythmia     CPVT.   Found on genetic testing after 14y/o  Son’s death; unwitnessed cardiac arrest.  **ICD implanted.    Arthritis     Catecholaminergic polymorphic ventricular tachycardia (Multi)     Chronic pain disorder     Lumbar Stenosis    Depression     Hard to intubate     History of transfusion     Hyperlipidemia     Hypertension     Hypothyroidism     Joint pain     Degenerative Arthritis    Migraine     Nephrolithiasis     Spinal stenosis 2004    ongoing pain       Past Surgical History:   Procedure Laterality Date    BACK SURGERY      BUNIONECTOMY      CARDIAC DEFIBRILLATOR PLACEMENT Left      SECTION, LOW TRANSVERSE      ENDOMETRIAL ABLATION      FL GUIDED INJECTION HIP RIGHT Right 2023    FL GUIDED INJECTION HIP RIGHT 2023 [facility]    FL GUIDED INJECTION HIP RIGHT  2024    INSERT / REPLACE / REMOVE PACEMAKER      ICD placement,  Not Pacemaker    JOINT  REPLACEMENT  ,  &     Bilat Knees/ Right Shoulder    LUMBAR FUSION      LUMBAR LAMINECTOMY      OOPHORECTOMY      OTHER SURGICAL HISTORY      Generator change in ICD    SPINAL FUSION  2014    L1-L5 rods/screws    TOTAL KNEE ARTHROPLASTY Bilateral     TOTAL SHOULDER ARTHROPLASTY Right 10/2023    TUBAL LIGATION       Social History     Tobacco Use    Smoking status: Every Day     Current packs/day: 0.00     Average packs/day: 1 pack/day for 51.0 years (51.0 ttl pk-yrs)     Types: Cigarettes     Start date:      Last attempt to quit: 2024     Years since quittin.2     Passive exposure: Current    Smokeless tobacco: Former    Tobacco comments:     currently on Chantix…..Discontinued   Substance Use Topics    Alcohol use: Yes     Alcohol/week: 4.0 - 5.0 standard drinks of alcohol     Types: 4 - 5 Standard drinks or equivalent per week     Comment: monthly     Social History     Substance and Sexual Activity   Drug Use Never     Patient Sexual activity questions deferred to the physician.    Family History   Problem Relation Name Age of Onset    Other (elevated lipids) Mother Mom     Rheum arthritis Mother Mom     Diabetes Mother Mom     Hypertension Mother Mom     Other (Elevated lipids) Father Dad     Coronary artery disease Father Dad     Kidney failure Father Dad     Hypertension Father Dad     Diabetes Father Dad     Heart disease Father Dad     Arthritis Father Dad     Hyperlipidemia Father Dad     Kidney disease Father Dad     Other (thyroid problems) Sister      Other (Cardiac arrest) Son      Hyperlipidemia Sibling         Allergies   Allergen Reactions    Adhesive Rash    Other Other    Silver-Calcium Alginate Rash    Sulfa (Sulfonamide Antibiotics) Rash     Current Outpatient Medications   Medication Sig Dispense Refill    acetaminophen (Tylenol) 500 mg tablet Take 2 tablets (1,000 mg) by mouth every 8 hours if needed for mild pain (1 - 3).      atorvastatin (Lipitor) 40 mg  "tablet Take 1 tablet (40 mg) by mouth once daily. 90 tablet 1    buPROPion XL (Wellbutrin XL) 300 mg 24 hr tablet Take 1 tablet (300 mg) by mouth once daily in the morning. 90 tablet 1    cholecalciferol (Vitamin D-3) 25 MCG (1000 UT) capsule Take 1 capsule (25 mcg) by mouth once daily.      levothyroxine (Synthroid, Levoxyl) 175 mcg tablet Take 1 tablet (175 mcg) by mouth once daily in the morning. Take before meals. 90 tablet 1    losartan (Cozaar) 100 mg tablet Take 1 tablet (100 mg) by mouth once daily.      multivitamin tablet Take 1 tablet by mouth once daily.      naproxen sodium (Aleve) 220 mg tablet Take 2 tablets (440 mg) by mouth 2 times a day with meals.      propranolol LA (Inderal LA) 120 mg 24 hr capsule Take 2 capsules (240 mg) by mouth once daily. Do not crush, chew, or split. 180 capsule 1    sertraline (Zoloft) 100 mg tablet Take 2 tablets (200 mg) by mouth once daily. 180 tablet 1     No current facility-administered medications for this visit.     Review of Systems   Constitutional:  Positive for activity change.   HENT: Negative.     Eyes: Negative.    Respiratory: Negative.     Cardiovascular: Negative.    Gastrointestinal: Negative.    Endocrine: Negative.    Genitourinary: Negative.    Musculoskeletal:         Right hip pain. Walks with a cane   Skin: Negative.    Neurological: Negative.    Hematological: Negative.    Psychiatric/Behavioral: Negative.       /82   Pulse 67   Ht 1.651 m (5' 5\")   Wt 101 kg (221 lb 12.8 oz)   SpO2 99%   BMI 36.91 kg/m²     Physical Exam  Constitutional:       Appearance: She is obese.   HENT:      Head: Normocephalic and atraumatic.      Nose: Nose normal.      Mouth/Throat:      Mouth: Mucous membranes are moist.      Pharynx: Oropharynx is clear.   Eyes:      Extraocular Movements: Extraocular movements intact.      Conjunctiva/sclera: Conjunctivae normal.      Pupils: Pupils are equal, round, and reactive to light.   Cardiovascular:      Rate and " Rhythm: Normal rate and regular rhythm.      Pulses: Normal pulses.      Heart sounds: Normal heart sounds.   Pulmonary:      Effort: Pulmonary effort is normal.      Breath sounds: Normal breath sounds.   Abdominal:      General: Bowel sounds are normal.      Palpations: Abdomen is soft.   Musculoskeletal:      Cervical back: Normal range of motion and neck supple.      Comments: Right hip pain with weightbearing; Pt ambulates with a cane.    Skin:     General: Skin is warm and dry.   Neurological:      General: No focal deficit present.      Mental Status: She is alert and oriented to person, place, and time. Mental status is at baseline.   Psychiatric:         Mood and Affect: Mood normal.         Behavior: Behavior normal.         Thought Content: Thought content normal.         Judgment: Judgment normal.        PAT AIRWAY:   Airway:     Mallampati::  II    TM distance::  >3 FB    Neck ROM::  Full  normal        ASA:3  DASI:  33.95  METS: 6.9  CHADS: 2.8%  RCRI: 0.9%  STOP BAN    Assessment and Plan:     Primary osteoarthritis of right hip: arthroplasty total hip right.  Catecholaminergic polymorphic ventricular tachycardia: ICD placed in  with generator replacement in . Pt reports the last time the battery was checked was in  and per pt the report showed 5 years of battery life remaining. Per pt the ICD has never went off. Pt used to see Dr Finnegan. Pt reports when the ICD device battery runs out she does not wish to have a new generator placed. Pt reports the device was placed d/t one of her children being diagnosed with the problem and finding out that the genetic condition was passed from her to her children.   HTN: pt is taking losartan.  Migraines:  pt is taking propranolol.  Hypothyroidism: 10/3/2023: TSH: 1.65  Daily cigarette smoker.   BMI: 36.91    EKG completed today at PCP's office scanned into [employer].  CBC and T&S completed in PAT.  BMP completed on 2024.    Angela VALLE  Fidel, APRN-CNP    Addendum:  Updated Dr Charles that the patient has an ICD and will not have a battery check for day of surgery d/t patient refusing battery check.

## 2024-05-14 NOTE — PROGRESS NOTES
Subjective   Patient ID:   Monse Aguilera is a 63 y.o. female who presents for Surgical clearance  HPI  Patient denies any falls, urgent care, ER, hospitalization, new diagnoses,  in the past year.  Denies any issues with chest pain, chest pressure, shortness of breath, constipation, diarrhea, blood in stool, urinary urgency, frequency, blood in urine, muscle weakness in arms and legs, numbness or tingling in fingers or toes.  Admits to shoulder replacement in February with Dr. Yan-patient presented to discuss dissatisfaction with [facility] compared to [facility] having to print paperwork on therapy or having to look at it through her smart phone.  States a few times that [facility] gave her a binder as patient to discuss with Dr. Taylor's office.  Patient did not bring any preop paperwork with her today.  She is not clear why she had to do this today with me as she did not have the do with her shoulder.  PAT at 10:30 AM at [facility] today  Will need refills on bupropion and propranolol sent to [facility]  today.  Date of surgery: 5/20/24  Type of surgery: Right total hip   Surgeon performing: Dr Salgado  Labs/tests: Patient did not bring paperwork no labs ordered today will do EKG in office   previous stress test: greater than 5 years ago  Smoking status: Current has not had one since yesterday   History of DVT/PE: Denies   Prior anesthesia: yes, denies any issue with anesthesia  Blood thinners:  Aleve this am. States last dose today and will not take any more prior to surgery  CP or SOB: Denies     Current Outpatient Medications:     acetaminophen (Tylenol) 500 mg tablet, Take 2 tablets (1,000 mg) by mouth every 8 hours if needed for mild pain (1 - 3)., Disp: , Rfl:     atorvastatin (Lipitor) 40 mg tablet, Take 1 tablet (40 mg) by mouth once daily., Disp: 90 tablet, Rfl: 1    cholecalciferol (Vitamin D-3) 25 MCG (1000 UT) capsule, Take 1 capsule (25 mcg) by mouth once daily., Disp: , Rfl:     levothyroxine (Synthroid, Levoxyl) 175 mcg  tablet, Take 1 tablet (175 mcg) by mouth once daily in the morning. Take before meals., Disp: 90 tablet, Rfl: 1    losartan (Cozaar) 100 mg tablet, Take 1 tablet (100 mg) by mouth once daily., Disp: , Rfl:     multivitamin tablet, Take 1 tablet by mouth once daily., Disp: , Rfl:     naproxen sodium (Aleve) 220 mg tablet, Take 2 tablets (440 mg) by mouth 2 times a day with meals., Disp: , Rfl:     sertraline (Zoloft) 100 mg tablet, Take 2 tablets (200 mg) by mouth once daily., Disp: 180 tablet, Rfl: 1    buPROPion XL (Wellbutrin XL) 300 mg 24 hr tablet, Take 1 tablet (300 mg) by mouth once daily in the morning., Disp: 90 tablet, Rfl: 1    propranolol LA (Inderal LA) 120 mg 24 hr capsule, Take 2 capsules (240 mg) by mouth once daily. Do not crush, chew, or split., Disp: 180 capsule, Rfl: 1    Review of Systems  12 point review of systems negative unless stated above in HPI    Vitals:    05/14/24 0907   BP: 118/82   Pulse: 67   SpO2: 98%     Physical Exam  General: Alert and oriented, well nourished, no acute distress.  Lungs: Clear to auscultation, non-labored respiration.  Heart: Normal rate, regular rhythm, no murmur, gallop or edema.  Neurologic: Awake, alert, and oriented X3, CN II-XII intact.  Psychiatric: Cooperative, appropriate mood and affect.    Assessment/Plan   Diagnoses and all orders for this visit:  Encounter for preadmission testing  -     ECG 12 lead (Clinic Performed)  History of substance dependence (Multi)  CPVT (catecholaminergic polymorphic ventricular tachycardia) (Multi)  Obesity, morbid (Multi)  Tobacco use  Encounter for screening for malignant neoplasm of colon  -     Cologuard® colon cancer screening; Future  Depressive disorder  -     buPROPion XL (Wellbutrin XL) 300 mg 24 hr tablet; Take 1 tablet (300 mg) by mouth once daily in the morning.  Intractable chronic migraine with aura and without status migrainosus  -     propranolol LA (Inderal LA) 120 mg 24 hr capsule; Take 2 capsules (240  mg) by mouth once daily. Do not crush, chew, or split.  Long QT syndrome  Mixed hyperlipidemia  Essential hypertension    Surgical Clearance:      From my standpoint the patient is cleared for surgery pending I do not need to order labs per the surgeon, BMI 36.91 final clearance to be determined by surgeon and anesthesia. She will have PAT done later today.    HYPERLIPIDEMIA:  Decrease intake of saturated fats, fast food, sweets.  Increase intake of fresh fruit fresh vegetables and lean meats.  Increase healthy fats seeds, nuts, olive oil instead of butter.  walk 150 minutes/week for heart health.  HYPERTENSION:   Decrease intake of processed foods, fast foods, lunch meat, canned soups, canned veggies.  Increase intake of fresh fruits, veggies, and lean meats. Monitor blood pressure at home, keep a log and bring this with you to your next appointment. Call the office if your blood pressure runs 150/90 or higher consistently.  *This note was dictated using DRAGON speech recognition software and was corrected for spelling or grammatical errors, but despite proofreading several typographical errors might be present that might affect the meaning of the content.*  Advanced care planning was discussed with Monse Aguilera today. We reviewed code status, Medical Power of , and Living will. Pt does not have  LW or POA.   Genet Gaffney, CNP

## 2024-05-14 NOTE — PATIENT INSTRUCTIONS
HYPERLIPIDEMIA:  Decrease intake of saturated fats, fast food, sweets.  Increase intake of fresh fruit fresh vegetables and lean meats.  Increase healthy fats seeds, nuts, olive oil instead of butter.  walk 150 minutes/week for heart health.  HYPERTENSION:   Decrease intake of processed foods, fast foods, lunch meat, canned soups, canned veggies.  Increase intake of fresh fruits, veggies, and lean meats. Monitor blood pressure at home, keep a log and bring this with you to your next appointment. Call the office if your blood pressure runs 150/90 or higher consistently.

## 2024-05-14 NOTE — LETTER
July 1, 2024     Monse Aguilera  [address] [facility] [address]      Dear Ms. Aguilera:    Below are the results from your recent visit:    Resulted Orders   Cologuard® colon cancer screening   Result Value Ref Range    NONINV COLON CA DNA+OCC BLD SCRN STL QL Negative Negative      Comment:        NEGATIVE TEST RESULT. A negative Cologuard result indicates a low likelihood that a colorectal cancer (CRC) or advanced adenoma (adenomatous polyps with more advanced pre-malignant features)  is present. The chance that a person with a negative Cologuard test has a colorectal cancer is less than 1 in 1500 (negative predictive value >99.9%) or has an  advanced adenoma is less than  5.3% (negative predictive value 94.7%). These data are based on a prospective cross-sectional study of 10,000 individuals at average risk for colorectal cancer who were screened with both Cologuard and colonoscopy. (Cristobal ARIZMENDI et al, N Engl J Med 2014;370(14):1006-8427) The normal value (reference range) for this assay is negative.    COLOGUARD RE-SCREENING RECOMMENDATION: Periodic colorectal cancer screening is an important part of preventive healthcare for asymptomatic individuals at average risk for colorectal cancer.  Following a negative Cologuard result, the American Cancer Society and U.S.  Multi-Society Task Force screening guidelines recommend a Cologuard re-screening interval of 3 years.   References: American Cancer Society Guideline for Colorectal Cancer Screening: https://www.cancer.org/cancer/colon-rectal-cancer/seqqhysix-ntwwwjjrv-cahgdpy/acs-recommendations.html.; Aram PUGA, Sin FLANNERY, Hayder MCCRAYK, Colorectal Cancer Screening: Recommendations for Physicians and Patients from the U.S. Multi-Society Task Force on Colorectal Cancer Screening , Am J Gastroenterology 2017; 112:5290-4022.    TEST DESCRIPTION: Composite algorithmic analysis of stool DNA-biomarkers with hemoglobin immunoassay.   Quantitative values of individual  biomarkers are not reportable and are not associated with individual biomarker result reference ranges. Cologuard is intended for colorectal cancer screening of adults of either sex, 45 years or older, who are at average-risk for colorectal cancer (CRC). Cologuard has been approved for use by the U.S. FDA. The performance of Cologuard was  established in a cross sectional study of average-risk adults aged 50-84. Cologuard performance in patients ages 45 to 49 years was estimated by sub-group analysis of near-age groups. Colonoscopies performed for a positive result may find as the most clinically significant lesion: colorectal cancer [4.0%], advanced adenoma (including sessile serrated polyps greater than or equal to 1cm diameter) [20%] or non- advanced adenoma [31%]; or no colorectal neoplasia [45%]. These estimates are derived from a prospective cross-sectional screening study of 10,000 individuals at average risk for colorectal cancer who were screened with both Cologuard and colonoscopy. (Cristobal REEVES. et al, N Engl J Med 2014;370(14):9655-1507.) Cologuard may produce a false negative or false positive result (no colorectal cancer or precancerous polyp present at colonoscopy follow up). A negative Cologuard test result does not guarantee the absence of CRC or advanced adenoma (pre-cancer). The current Cologuard  screening interval is every 3 years. (American Cancer Society and U.S. Multi-Society Task Force). Cologuard performance data in a 10,000 patient pivotal study using colonoscopy as the reference method can be accessed at the following location: www.[employer]/results. Additional description of the Cologuard test process, warnings and precautions can be found at www.[employer].com.       The test results show that your Cologuard screening for colorectal cancer is negative.   Recommendations are to repeat screening either with another Cologuard or Colonoscopy in 3 years.     If you have any questions or  concerns, please don't hesitate to call.         Sincerely,        Genet Gaffney, APRN-CNP

## 2024-05-15 LAB — BACTERIA UR CULT: ABNORMAL

## 2024-05-16 LAB — STAPHYLOCOCCUS SPEC CULT: NORMAL

## 2024-05-17 ENCOUNTER — ANESTHESIA EVENT (OUTPATIENT)
Dept: OPERATING ROOM | Facility: HOSPITAL | Age: 64
End: 2024-05-17
Payer: MEDICARE

## 2024-05-20 ENCOUNTER — HOSPITAL ENCOUNTER (OUTPATIENT)
Facility: HOSPITAL | Age: 64
Discharge: HOME | End: 2024-05-20
Attending: ORTHOPAEDIC SURGERY | Admitting: ORTHOPAEDIC SURGERY
Payer: MEDICARE

## 2024-05-20 ENCOUNTER — DOCUMENTATION (OUTPATIENT)
Dept: HOME HEALTH SERVICES | Facility: HOME HEALTH | Age: 64
End: 2024-05-20
Payer: MEDICARE

## 2024-05-20 ENCOUNTER — ANESTHESIA (OUTPATIENT)
Dept: OPERATING ROOM | Facility: HOSPITAL | Age: 64
End: 2024-05-20
Payer: MEDICARE

## 2024-05-20 ENCOUNTER — HOME HEALTH ADMISSION (OUTPATIENT)
Dept: HOME HEALTH SERVICES | Facility: HOME HEALTH | Age: 64
End: 2024-05-20
Payer: MEDICARE

## 2024-05-20 ENCOUNTER — APPOINTMENT (OUTPATIENT)
Dept: RADIOLOGY | Facility: HOSPITAL | Age: 64
End: 2024-05-20
Payer: MEDICARE

## 2024-05-20 VITALS
SYSTOLIC BLOOD PRESSURE: 111 MMHG | OXYGEN SATURATION: 94 % | RESPIRATION RATE: 16 BRPM | DIASTOLIC BLOOD PRESSURE: 78 MMHG | WEIGHT: 222.66 LBS | BODY MASS INDEX: 37.1 KG/M2 | HEART RATE: 88 BPM | TEMPERATURE: 97.2 F | HEIGHT: 65 IN

## 2024-05-20 DIAGNOSIS — M16.11 PRIMARY OSTEOARTHRITIS OF RIGHT HIP: Primary | ICD-10-CM

## 2024-05-20 LAB
ABO GROUP (TYPE) IN BLOOD: NORMAL
RH FACTOR (ANTIGEN D): NORMAL

## 2024-05-20 PROCEDURE — C1776 JOINT DEVICE (IMPLANTABLE): HCPCS | Performed by: ORTHOPAEDIC SURGERY

## 2024-05-20 PROCEDURE — 72170 X-RAY EXAM OF PELVIS: CPT

## 2024-05-20 PROCEDURE — 7100000002 HC RECOVERY ROOM TIME - EACH INCREMENTAL 1 MINUTE: Performed by: ORTHOPAEDIC SURGERY

## 2024-05-20 PROCEDURE — 3700000001 HC GENERAL ANESTHESIA TIME - INITIAL BASE CHARGE: Performed by: ORTHOPAEDIC SURGERY

## 2024-05-20 PROCEDURE — 3600000018 HC OR TIME - INITIAL BASE CHARGE - PROCEDURE LEVEL SIX: Performed by: ORTHOPAEDIC SURGERY

## 2024-05-20 PROCEDURE — 2780000003 HC OR 278 NO HCPCS: Performed by: ORTHOPAEDIC SURGERY

## 2024-05-20 PROCEDURE — 2500000005 HC RX 250 GENERAL PHARMACY W/O HCPCS: Performed by: ANESTHESIOLOGY

## 2024-05-20 PROCEDURE — 97530 THERAPEUTIC ACTIVITIES: CPT | Mod: GP | Performed by: PHYSICAL THERAPIST

## 2024-05-20 PROCEDURE — 3600000017 HC OR TIME - EACH INCREMENTAL 1 MINUTE - PROCEDURE LEVEL SIX: Performed by: ORTHOPAEDIC SURGERY

## 2024-05-20 PROCEDURE — 3700000002 HC GENERAL ANESTHESIA TIME - EACH INCREMENTAL 1 MINUTE: Performed by: ORTHOPAEDIC SURGERY

## 2024-05-20 PROCEDURE — 7100000010 HC PHASE TWO TIME - EACH INCREMENTAL 1 MINUTE: Performed by: ORTHOPAEDIC SURGERY

## 2024-05-20 PROCEDURE — 2720000007 HC OR 272 NO HCPCS: Performed by: ORTHOPAEDIC SURGERY

## 2024-05-20 PROCEDURE — 2500000004 HC RX 250 GENERAL PHARMACY W/ HCPCS (ALT 636 FOR OP/ED): Performed by: ANESTHESIOLOGY

## 2024-05-20 PROCEDURE — 7100000009 HC PHASE TWO TIME - INITIAL BASE CHARGE: Performed by: ORTHOPAEDIC SURGERY

## 2024-05-20 PROCEDURE — 2500000005 HC RX 250 GENERAL PHARMACY W/O HCPCS: Performed by: ORTHOPAEDIC SURGERY

## 2024-05-20 PROCEDURE — 97161 PT EVAL LOW COMPLEX 20 MIN: CPT | Mod: GP | Performed by: PHYSICAL THERAPIST

## 2024-05-20 PROCEDURE — A9999 DME SUPPLY OR ACCESSORY, NOS: HCPCS | Performed by: ORTHOPAEDIC SURGERY

## 2024-05-20 PROCEDURE — 36415 COLL VENOUS BLD VENIPUNCTURE: CPT | Performed by: ORTHOPAEDIC SURGERY

## 2024-05-20 PROCEDURE — A4649 SURGICAL SUPPLIES: HCPCS | Performed by: ORTHOPAEDIC SURGERY

## 2024-05-20 PROCEDURE — 2500000004 HC RX 250 GENERAL PHARMACY W/ HCPCS (ALT 636 FOR OP/ED): Performed by: ORTHOPAEDIC SURGERY

## 2024-05-20 PROCEDURE — G0378 HOSPITAL OBSERVATION PER HR: HCPCS

## 2024-05-20 PROCEDURE — 72170 X-RAY EXAM OF PELVIS: CPT | Performed by: RADIOLOGY

## 2024-05-20 PROCEDURE — 7100000011 HC EXTENDED STAY RECOVERY HOURLY - NURSING UNIT

## 2024-05-20 PROCEDURE — C1713 ANCHOR/SCREW BN/BN,TIS/BN: HCPCS | Performed by: ORTHOPAEDIC SURGERY

## 2024-05-20 PROCEDURE — 2500000004 HC RX 250 GENERAL PHARMACY W/ HCPCS (ALT 636 FOR OP/ED): Mod: JZ | Performed by: ORTHOPAEDIC SURGERY

## 2024-05-20 PROCEDURE — 7100000001 HC RECOVERY ROOM TIME - INITIAL BASE CHARGE: Performed by: ORTHOPAEDIC SURGERY

## 2024-05-20 PROCEDURE — 2500000001 HC RX 250 WO HCPCS SELF ADMINISTERED DRUGS (ALT 637 FOR MEDICARE OP): Performed by: ORTHOPAEDIC SURGERY

## 2024-05-20 DEVICE — TRIDENT II TRITANIUM CLUSTER 52E
Type: IMPLANTABLE DEVICE | Site: HIP | Status: FUNCTIONAL
Brand: TRIDENT II

## 2024-05-20 DEVICE — CERAMIC V40 FEMORAL HEAD
Type: IMPLANTABLE DEVICE | Site: HIP | Status: FUNCTIONAL
Brand: BIOLOX

## 2024-05-20 DEVICE — INSERT, TRIDENT X3 POLYETHYLENE, 10 DEG, 36MM E: Type: IMPLANTABLE DEVICE | Site: HIP | Status: FUNCTIONAL

## 2024-05-20 DEVICE — 127 DEGREE NECK ANGLE HIP STEM
Type: IMPLANTABLE DEVICE | Site: HIP | Status: FUNCTIONAL
Brand: ACCOLADE

## 2024-05-20 DEVICE — 6.5MM LOW PROFILE HEX SCREW 25MM
Type: IMPLANTABLE DEVICE | Site: HIP | Status: FUNCTIONAL
Brand: TRIDENT II

## 2024-05-20 RX ORDER — BUPIVACAINE HCL/EPINEPHRINE 0.25-.0005
VIAL (ML) INJECTION AS NEEDED
Status: DISCONTINUED | OUTPATIENT
Start: 2024-05-20 | End: 2024-05-20

## 2024-05-20 RX ORDER — PREGABALIN 75 MG/1
75 CAPSULE ORAL ONCE
Status: COMPLETED | OUTPATIENT
Start: 2024-05-20 | End: 2024-05-20

## 2024-05-20 RX ORDER — ASPIRIN 81 MG/1
81 TABLET ORAL 2 TIMES DAILY
Status: DISCONTINUED | OUTPATIENT
Start: 2024-05-20 | End: 2024-05-20 | Stop reason: HOSPADM

## 2024-05-20 RX ORDER — PHENYLEPHRINE HCL IN 0.9% NACL 1 MG/10 ML
SYRINGE (ML) INTRAVENOUS AS NEEDED
Status: DISCONTINUED | OUTPATIENT
Start: 2024-05-20 | End: 2024-05-20

## 2024-05-20 RX ORDER — CELECOXIB 200 MG/1
400 CAPSULE ORAL ONCE
Status: COMPLETED | OUTPATIENT
Start: 2024-05-20 | End: 2024-05-20

## 2024-05-20 RX ORDER — ONDANSETRON HYDROCHLORIDE 2 MG/ML
4 INJECTION, SOLUTION INTRAVENOUS EVERY 8 HOURS PRN
Status: DISCONTINUED | OUTPATIENT
Start: 2024-05-20 | End: 2024-05-20 | Stop reason: HOSPADM

## 2024-05-20 RX ORDER — MORPHINE SULFATE 2 MG/ML
2 INJECTION, SOLUTION INTRAMUSCULAR; INTRAVENOUS EVERY 2 HOUR PRN
Status: DISCONTINUED | OUTPATIENT
Start: 2024-05-20 | End: 2024-05-20 | Stop reason: HOSPADM

## 2024-05-20 RX ORDER — ACETAMINOPHEN 500 MG
1000 TABLET ORAL EVERY 6 HOURS
Status: DISCONTINUED | OUTPATIENT
Start: 2024-05-20 | End: 2024-05-20 | Stop reason: HOSPADM

## 2024-05-20 RX ORDER — ASPIRIN 81 MG/1
81 TABLET ORAL 2 TIMES DAILY
Qty: 56 TABLET | Refills: 0 | Status: SHIPPED | OUTPATIENT
Start: 2024-05-20 | End: 2024-06-17

## 2024-05-20 RX ORDER — KETOROLAC TROMETHAMINE 30 MG/ML
15 INJECTION, SOLUTION INTRAMUSCULAR; INTRAVENOUS EVERY 6 HOURS PRN
Status: DISCONTINUED | OUTPATIENT
Start: 2024-05-20 | End: 2024-05-20 | Stop reason: HOSPADM

## 2024-05-20 RX ORDER — OXYCODONE HYDROCHLORIDE 5 MG/1
5 TABLET ORAL EVERY 4 HOURS PRN
Qty: 20 TABLET | Refills: 0 | Status: SHIPPED | OUTPATIENT
Start: 2024-05-20 | End: 2024-05-27

## 2024-05-20 RX ORDER — ONDANSETRON HYDROCHLORIDE 2 MG/ML
4 INJECTION, SOLUTION INTRAVENOUS ONCE AS NEEDED
Status: DISCONTINUED | OUTPATIENT
Start: 2024-05-20 | End: 2024-05-20 | Stop reason: HOSPADM

## 2024-05-20 RX ORDER — FENTANYL CITRATE 50 UG/ML
50 INJECTION, SOLUTION INTRAMUSCULAR; INTRAVENOUS EVERY 5 MIN PRN
Status: DISCONTINUED | OUTPATIENT
Start: 2024-05-20 | End: 2024-05-20 | Stop reason: HOSPADM

## 2024-05-20 RX ORDER — MIDAZOLAM HYDROCHLORIDE 1 MG/ML
2 INJECTION, SOLUTION INTRAMUSCULAR; INTRAVENOUS ONCE
Status: COMPLETED | OUTPATIENT
Start: 2024-05-20 | End: 2024-05-20

## 2024-05-20 RX ORDER — CEFAZOLIN SODIUM 2 G/100ML
2 INJECTION, SOLUTION INTRAVENOUS ONCE
Status: COMPLETED | OUTPATIENT
Start: 2024-05-20 | End: 2024-05-20

## 2024-05-20 RX ORDER — PROPRANOLOL HYDROCHLORIDE 80 MG/1
240 CAPSULE, EXTENDED RELEASE ORAL DAILY
Status: DISCONTINUED | OUTPATIENT
Start: 2024-05-20 | End: 2024-05-20 | Stop reason: HOSPADM

## 2024-05-20 RX ORDER — ACETAMINOPHEN 500 MG
1000 TABLET ORAL EVERY 6 HOURS PRN
Qty: 120 TABLET | Refills: 0 | Status: SHIPPED | OUTPATIENT
Start: 2024-05-20 | End: 2024-06-04

## 2024-05-20 RX ORDER — ACETAMINOPHEN 325 MG/1
650 TABLET ORAL ONCE
Status: DISCONTINUED | OUTPATIENT
Start: 2024-05-20 | End: 2024-05-20 | Stop reason: HOSPADM

## 2024-05-20 RX ORDER — CEFAZOLIN SODIUM 2 G/100ML
2 INJECTION, SOLUTION INTRAVENOUS EVERY 8 HOURS
Status: DISCONTINUED | OUTPATIENT
Start: 2024-05-20 | End: 2024-05-20 | Stop reason: HOSPADM

## 2024-05-20 RX ORDER — BISMUTH SUBSALICYLATE 262 MG
1 TABLET,CHEWABLE ORAL DAILY
Status: DISCONTINUED | OUTPATIENT
Start: 2024-05-20 | End: 2024-05-20 | Stop reason: HOSPADM

## 2024-05-20 RX ORDER — DOCUSATE SODIUM 100 MG/1
100 CAPSULE, LIQUID FILLED ORAL 2 TIMES DAILY
Status: DISCONTINUED | OUTPATIENT
Start: 2024-05-20 | End: 2024-05-20 | Stop reason: HOSPADM

## 2024-05-20 RX ORDER — DOCUSATE SODIUM 100 MG/1
100 CAPSULE, LIQUID FILLED ORAL 2 TIMES DAILY
Qty: 30 CAPSULE | Refills: 0 | Status: SHIPPED | OUTPATIENT
Start: 2024-05-20 | End: 2024-06-04

## 2024-05-20 RX ORDER — OXYCODONE HYDROCHLORIDE 5 MG/1
5 TABLET ORAL EVERY 4 HOURS PRN
Status: DISCONTINUED | OUTPATIENT
Start: 2024-05-20 | End: 2024-05-20 | Stop reason: HOSPADM

## 2024-05-20 RX ORDER — SODIUM CHLORIDE, SODIUM LACTATE, POTASSIUM CHLORIDE, CALCIUM CHLORIDE 600; 310; 30; 20 MG/100ML; MG/100ML; MG/100ML; MG/100ML
100 INJECTION, SOLUTION INTRAVENOUS CONTINUOUS
Status: DISCONTINUED | OUTPATIENT
Start: 2024-05-20 | End: 2024-05-20 | Stop reason: HOSPADM

## 2024-05-20 RX ORDER — DEXAMETHASONE SODIUM PHOSPHATE 10 MG/ML
INJECTION INTRAMUSCULAR; INTRAVENOUS AS NEEDED
Status: DISCONTINUED | OUTPATIENT
Start: 2024-05-20 | End: 2024-05-20

## 2024-05-20 RX ORDER — LABETALOL HYDROCHLORIDE 5 MG/ML
5 INJECTION, SOLUTION INTRAVENOUS ONCE AS NEEDED
Status: DISCONTINUED | OUTPATIENT
Start: 2024-05-20 | End: 2024-05-20 | Stop reason: HOSPADM

## 2024-05-20 RX ORDER — FENTANYL CITRATE 50 UG/ML
100 INJECTION, SOLUTION INTRAMUSCULAR; INTRAVENOUS ONCE
Status: COMPLETED | OUTPATIENT
Start: 2024-05-20 | End: 2024-05-20

## 2024-05-20 RX ORDER — LEVOTHYROXINE SODIUM 175 UG/1
175 TABLET ORAL
Status: DISCONTINUED | OUTPATIENT
Start: 2024-05-21 | End: 2024-05-20 | Stop reason: HOSPADM

## 2024-05-20 RX ORDER — ATORVASTATIN CALCIUM 40 MG/1
40 TABLET, FILM COATED ORAL DAILY
Status: DISCONTINUED | OUTPATIENT
Start: 2024-05-20 | End: 2024-05-20 | Stop reason: HOSPADM

## 2024-05-20 RX ORDER — ALBUTEROL SULFATE 0.83 MG/ML
2.5 SOLUTION RESPIRATORY (INHALATION) ONCE AS NEEDED
Status: DISCONTINUED | OUTPATIENT
Start: 2024-05-20 | End: 2024-05-20 | Stop reason: HOSPADM

## 2024-05-20 RX ORDER — OXYCODONE HYDROCHLORIDE 5 MG/1
10 TABLET ORAL EVERY 4 HOURS PRN
Status: DISCONTINUED | OUTPATIENT
Start: 2024-05-20 | End: 2024-05-20 | Stop reason: HOSPADM

## 2024-05-20 RX ORDER — BUPROPION HYDROCHLORIDE 300 MG/1
300 TABLET ORAL EVERY MORNING
Status: DISCONTINUED | OUTPATIENT
Start: 2024-05-21 | End: 2024-05-20 | Stop reason: HOSPADM

## 2024-05-20 RX ORDER — NORETHINDRONE AND ETHINYL ESTRADIOL 0.5-0.035
KIT ORAL AS NEEDED
Status: DISCONTINUED | OUTPATIENT
Start: 2024-05-20 | End: 2024-05-20

## 2024-05-20 RX ORDER — HYDRALAZINE HYDROCHLORIDE 20 MG/ML
5 INJECTION INTRAMUSCULAR; INTRAVENOUS EVERY 30 MIN PRN
Status: DISCONTINUED | OUTPATIENT
Start: 2024-05-20 | End: 2024-05-20 | Stop reason: HOSPADM

## 2024-05-20 RX ORDER — SERTRALINE HYDROCHLORIDE 100 MG/1
200 TABLET, FILM COATED ORAL DAILY
Status: DISCONTINUED | OUTPATIENT
Start: 2024-05-20 | End: 2024-05-20 | Stop reason: HOSPADM

## 2024-05-20 RX ORDER — POLYETHYLENE GLYCOL 3350 17 G/17G
17 POWDER, FOR SOLUTION ORAL DAILY
Status: DISCONTINUED | OUTPATIENT
Start: 2024-05-20 | End: 2024-05-20 | Stop reason: HOSPADM

## 2024-05-20 RX ORDER — TRANEXAMIC ACID 100 MG/ML
INJECTION, SOLUTION INTRAVENOUS AS NEEDED
Status: DISCONTINUED | OUTPATIENT
Start: 2024-05-20 | End: 2024-05-20

## 2024-05-20 RX ORDER — SODIUM CHLORIDE, SODIUM LACTATE, POTASSIUM CHLORIDE, CALCIUM CHLORIDE 600; 310; 30; 20 MG/100ML; MG/100ML; MG/100ML; MG/100ML
125 INJECTION, SOLUTION INTRAVENOUS CONTINUOUS
Status: DISCONTINUED | OUTPATIENT
Start: 2024-05-20 | End: 2024-05-20 | Stop reason: HOSPADM

## 2024-05-20 RX ORDER — PROPOFOL 10 MG/ML
INJECTION, EMULSION INTRAVENOUS AS NEEDED
Status: DISCONTINUED | OUTPATIENT
Start: 2024-05-20 | End: 2024-05-20

## 2024-05-20 RX ADMIN — CELECOXIB 400 MG: 200 CAPSULE ORAL at 06:31

## 2024-05-20 RX ADMIN — Medication 200 MCG: at 08:41

## 2024-05-20 RX ADMIN — MIDAZOLAM HYDROCHLORIDE 2 MG: 1 INJECTION, SOLUTION INTRAMUSCULAR; INTRAVENOUS at 07:42

## 2024-05-20 RX ADMIN — Medication 100 MCG: at 09:09

## 2024-05-20 RX ADMIN — PROPOFOL 630 MG: 10 INJECTION, EMULSION INTRAVENOUS at 08:18

## 2024-05-20 RX ADMIN — TRANEXAMIC ACID 2000 MG: 100 INJECTION, SOLUTION INTRAVENOUS at 08:19

## 2024-05-20 RX ADMIN — Medication 100 MCG: at 09:16

## 2024-05-20 RX ADMIN — OXYCODONE 5 MG: 5 TABLET ORAL at 15:03

## 2024-05-20 RX ADMIN — EPHEDRINE SULFATE 10 MG: 50 INJECTION, SOLUTION INTRAVENOUS at 08:51

## 2024-05-20 RX ADMIN — CEFAZOLIN SODIUM 2 G: 2 INJECTION, SOLUTION INTRAVENOUS at 08:15

## 2024-05-20 RX ADMIN — FENTANYL CITRATE 100 MCG: 50 INJECTION INTRAMUSCULAR; INTRAVENOUS at 07:42

## 2024-05-20 RX ADMIN — EPHEDRINE SULFATE 10 MG: 50 INJECTION, SOLUTION INTRAVENOUS at 09:17

## 2024-05-20 RX ADMIN — EPHEDRINE SULFATE 10 MG: 50 INJECTION, SOLUTION INTRAVENOUS at 08:40

## 2024-05-20 RX ADMIN — Medication 100 MCG: at 09:00

## 2024-05-20 RX ADMIN — Medication 10 ML: at 07:50

## 2024-05-20 RX ADMIN — SODIUM CHLORIDE, SODIUM LACTATE, POTASSIUM CHLORIDE, AND CALCIUM CHLORIDE 100 ML/HR: 600; 310; 30; 20 INJECTION, SOLUTION INTRAVENOUS at 06:59

## 2024-05-20 RX ADMIN — DEXAMETHASONE SODIUM PHOSPHATE 10 MG: 10 INJECTION, SOLUTION INTRAMUSCULAR; INTRAVENOUS at 07:50

## 2024-05-20 RX ADMIN — Medication 100 MCG: at 08:50

## 2024-05-20 RX ADMIN — PREGABALIN 75 MG: 75 CAPSULE ORAL at 06:30

## 2024-05-20 RX ADMIN — EPHEDRINE SULFATE 10 MG: 50 INJECTION, SOLUTION INTRAVENOUS at 08:57

## 2024-05-20 RX ADMIN — POVIDONE-IODINE 1 APPLICATION: 5 SOLUTION TOPICAL at 06:26

## 2024-05-20 RX ADMIN — Medication 100 MCG: at 09:05

## 2024-05-20 SDOH — HEALTH STABILITY: MENTAL HEALTH: CURRENT SMOKER: 1

## 2024-05-20 ASSESSMENT — PAIN - FUNCTIONAL ASSESSMENT
PAIN_FUNCTIONAL_ASSESSMENT: 0-10
PAIN_FUNCTIONAL_ASSESSMENT: CPOT (CRITICAL CARE PAIN OBSERVATION TOOL)
PAIN_FUNCTIONAL_ASSESSMENT: 0-10

## 2024-05-20 ASSESSMENT — COGNITIVE AND FUNCTIONAL STATUS - GENERAL
MOVING FROM LYING ON BACK TO SITTING ON SIDE OF FLAT BED WITH BEDRAILS: A LITTLE
STANDING UP FROM CHAIR USING ARMS: A LITTLE
CLIMB 3 TO 5 STEPS WITH RAILING: A LITTLE
WALKING IN HOSPITAL ROOM: A LITTLE
MOVING TO AND FROM BED TO CHAIR: A LITTLE
MOBILITY SCORE: 17
TURNING FROM BACK TO SIDE WHILE IN FLAT BAD: A LOT

## 2024-05-20 ASSESSMENT — PAIN SCALES - GENERAL
PAINLEVEL_OUTOF10: 0 - NO PAIN
PAINLEVEL_OUTOF10: 2
PAINLEVEL_OUTOF10: 0 - NO PAIN
PAINLEVEL_OUTOF10: 0 - NO PAIN
PAINLEVEL_OUTOF10: 10 - WORST POSSIBLE PAIN
PAINLEVEL_OUTOF10: 0 - NO PAIN
PAINLEVEL_OUTOF10: 5 - MODERATE PAIN
PAINLEVEL_OUTOF10: 0 - NO PAIN
PAINLEVEL_OUTOF10: 0 - NO PAIN
PAINLEVEL_OUTOF10: 2
PAINLEVEL_OUTOF10: 5 - MODERATE PAIN
PAIN_LEVEL: 1
PAINLEVEL_OUTOF10: 0 - NO PAIN

## 2024-05-20 ASSESSMENT — PAIN DESCRIPTION - DESCRIPTORS
DESCRIPTORS: DULL;ACHING
DESCRIPTORS: DULL;ACHING

## 2024-05-20 ASSESSMENT — ACTIVITIES OF DAILY LIVING (ADL): ADL_ASSISTANCE: INDEPENDENT

## 2024-05-20 NOTE — HH CARE COORDINATION
Home Care received a Referral for Physical Therapy. We have processed the referral for a Start of Care on 05/21.     If you have any questions or concerns, please feel free to contact us at [phone]. Follow the prompts, enter your five digit zip code, and you will be directed to your care team on [facility].

## 2024-05-20 NOTE — DISCHARGE SUMMARY
Discharge Diagnosis  Primary osteoarthritis of right hip    Issues Requiring Follow-Up  Right total hip    Test Results Pending At Discharge  Pending Labs       No current pending labs.            Hospital Course   Patient brought to the operative day of admission for total hip replacement.  Uncomplicated surgery.  Plan for home discharge same day.    Pertinent Physical Exam At Time of Discharge  Physical Exam    Home Medications     Medication List      START taking these medications     aspirin 81 mg EC tablet; Take 1 tablet (81 mg) by mouth 2 times a day   for 28 days.   docusate sodium 100 mg capsule; Commonly known as: Colace; Take 1   capsule (100 mg) by mouth 2 times a day for 15 days.     CHANGE how you take these medications     * acetaminophen 500 mg tablet; Commonly known as: Tylenol; What changed:   Another medication with the same name was added. Make sure you understand   how and when to take each.   * acetaminophen 500 mg tablet; Commonly known as: Tylenol; Take 2   tablets (1,000 mg) by mouth every 6 hours if needed for mild pain (1 - 3)   for up to 15 days.; What changed: You were already taking a medication   with the same name, and this prescription was added. Make sure you   understand how and when to take each.   oxyCODONE 5 mg immediate release tablet; Commonly known as: Roxicodone;   Take 1 tablet (5 mg) by mouth every 4 hours if needed for severe pain (7 -   10) for up to 7 days.; What changed: when to take this  * This list has 2 medication(s) that are the same as other medications   prescribed for you. Read the directions carefully, and ask your doctor or   other care provider to review them with you.     CONTINUE taking these medications     Aleve 220 mg tablet; Generic drug: naproxen sodium   atorvastatin 40 mg tablet; Commonly known as: Lipitor; Take 1 tablet (40   mg) by mouth once daily.   buPROPion  mg 24 hr tablet; Commonly known as: Wellbutrin XL; Take   1 tablet (300 mg) by  mouth once daily in the morning.   cholecalciferol 25 MCG (1000 UT) capsule; Commonly known as: Vitamin D-3   levothyroxine 175 mcg tablet; Commonly known as: Synthroid, Levoxyl;   Take 1 tablet (175 mcg) by mouth once daily in the morning. Take before   meals.   losartan 100 mg tablet; Commonly known as: Cozaar   multivitamin tablet   propranolol  mg 24 hr capsule; Commonly known as: Inderal LA; Take   2 capsules (240 mg) by mouth once daily. Do not crush, chew, or split.   sertraline 100 mg tablet; Commonly known as: Zoloft; Take 2 tablets (200   mg) by mouth once daily.       Outpatient Follow-Up  Future Appointments   Date Time Provider Department Center   6/11/2024  9:30 AM Keila Gray PT [id number]       Josiah Salgado MD

## 2024-05-20 NOTE — ANESTHESIA PROCEDURE NOTES
Peripheral Block    Patient location during procedure: pre-op  Start time: 5/20/2024 7:50 AM  End time: 5/20/2024 7:57 AM  Reason for block: at surgeon's request and post-op pain management  Staffing  Performed: attending   Authorized by: José Mckeon MD    Performed by: José Mckeon MD  Preanesthetic Checklist  Completed: patient identified, IV checked, site marked, risks and benefits discussed, surgical consent, monitors and equipment checked, pre-op evaluation and timeout performed   Timeout performed at: 5/20/2024 7:40 AM  Peripheral Block  Patient position: laying flat  Prep: ChloraPrep  Patient monitoring: heart rate and cardiac monitor  Block type: other (PENG)  Laterality: right  Injection technique: single-shot  Guidance: ultrasound guided  Needle  Needle type: short-bevel   Needle gauge: 21 G  Needle length: 10 cm  Needle localization: anatomical landmarks and ultrasound guidance  Catheter at skin depth: 8 cm  Assessment  Injection assessment: negative aspiration for heme and no paresthesia on injection  Paresthesia pain: none  Heart rate change: no  Slow fractionated injection: yes  Additional Notes  Pericapsular Nerve Group Block, Bupivacaine 0.25% with epi x 20mL, with dexamethasone added, 10mg.

## 2024-05-20 NOTE — PROGRESS NOTES
Physical Therapy    Physical Therapy Evaluation & Treatment    Patient Name: Monse Aguilera  MRN: [id number]  Today's Date: 5/20/2024   Time Calculation  Start Time: 1504  Stop Time: 1528  Time Calculation (min): 24 min    Assessment/Plan   PT Assessment  End of Session Patient Position:  (seated EOB with RN at bedside)   IP OR SWING BED PT PLAN  Inpatient or Swing Bed: Inpatient  PT Plan  Treatment/Interventions: Transfer training, Bed mobility, Gait training, Stair training, Strengthening, Range of motion, Therapeutic exercise, Therapeutic activity  PT Frequency:  (Eval/tx, 1 visit as pt planned to d/c today.)  PT Discharge Recommendations: Low intensity level of continued care  PT Recommended Transfer Status: Assist x1 (FWW)  PT - OK to Discharge: Yes      Subjective     General Visit Information:  General  Reason for Referral: Impaired functional mobility. This 63 year old female was admitted for elective surgery. She is now s/p R THOMAS by Dr. Salgado on 5/20/24.  Prior to Session Communication: Bedside nurse  Patient Position Received: Bed, 3 rail up, Alarm off, not on at start of session  General Comment: RN cleared pt for participation. She agreed to session and was fully engaged throughout.    Home Living:  Home Living  Type of Home: House  Lives With: Spouse  Home Adaptive Equipment:  (axillary crutches, straight cane, FWW)  Home Layout: Two level, Able to live on main level with bedroom/bathroom  Home Access: Stairs to enter with rails  Entrance Stairs-Rails: Both  Entrance Stairs-Number of Steps: 3  Bathroom Shower/Tub: Walk-in shower, Tub/shower unit  Bathroom Equipment: Grab bars in shower, Shower chair without back    Prior Level of Function:  Prior Function Per Pt/Caregiver Report  ADL Assistance: Independent  Homemaking Assistance: Independent  Ambulatory Assistance:  (Reported 1 fall in the past year.)  Transfers:  (Mod I)  Gait:  (Mod I with straight cane)  Stairs:  (Mod I with hand rail)  Prior Function  "Comments: (+) driving    Precautions:  Hearing/Visual Limitations:  (Hearing WFL. Wears glasses.)  Medical Precautions: Fall precautions  Post-Surgical Precautions: Right hip precautions  Precautions Comment: Issued hand out with posterior hip precautions and verbally reviewed  precautions. Hand out also included post THOMAS therex. Instruted her to perform x20 reps each twice a day. She verbalized understanding.    Vital Signs:  Heart Rate: 72  Heart Rate Source: Monitor  SpO2: 95 %  BP: 111/78  MAP (mmHg): 86  BP Location: Right arm  BP Method: Automatic  Patient Position: Lying    Objective   Pain:  Pain Assessment: 0-10  Pain Score: 5 - Moderate pain  Pain Type: Surgical pain  Pain Location: Hip  Pain Orientation: Right  Pain Interventions: Ambulation/increased activity, Repositioned    Cognition:  Overall Cognitive Status: Within Functional Limits    General Assessments:  Activity Tolerance  Endurance: Tolerates 10 - 20 min exercise with multiple rests    Sensation  Not tested; during ambulation pt stated \"it feels like I'm walking on memory foam.\" Pt with spinal block for surgery.    ROM/Strength  BLE AROM: WFL except R hip limited as anticipated post-op  BLE strength:  Based on observation of mobility, grossly >/=3+/5 except at R hip >/=3-/5    Perception  Motor Planning: Appears intact    Coordination  Movements are Fluid and Coordinated: Yes    Postural Control  Within Functional Limits  Posture Comment: fwd head posture; trunk fwd flexed at hips in stance    Static Sitting Balance  Static Sitting-Balance Support: Feet supported, No upper extremity supported  Static Sitting-Level of Assistance: Close supervision    Dynamic Sitting Balance  Dynamic Sitting-Balance Support: Bilateral upper extremity supported (unilateral LE support)  Dynamic Sitting-Comments: Close S    Static Standing Balance  Static Standing-Balance Support: Bilateral upper extremity supported  Static Standing-Level of Assistance: Contact " guard    Dynamic Standing Balance  Dynamic Standing-Balance Support: Bilateral upper extremity supported  Dynamic Standing-Comments: CGA x1    Functional Assessments:  Bed Mobility  Bed Mobility 1: Supine to sitting  Level of Assistance 1: Moderate assistance (assist to RLE and trunk)  Bed Mobility Comments 1: HOB elevated and used R bed rail    Transfers  Technique 1: Sit to stand, Stand to sit  Transfer Device 1: Walker  Transfer Level of Assistance 1: Contact guard (cued for RLE position to maintain hip precautions)  Trials/Comments 1: x1 trial at EOB    Ambulation/Gait Training  Surface 1: Level tile  Device 1: Rolling walker  Assistance 1: Contact guard (Cued for smaller walker movements with tight turns.)  Comments/Distance (ft) 1: 50 ft with minimial step through pattern, slow velocity, and discontinuous movement. Steady gait.    Stairs  Stairs: No (Prior to verbal instruction,pt stated correct LE sequencing to protect RLE during stair negotiaiton. She said she was familiar with the correct sequencing as she underwent bilat TKAs.)     Treatments:  Bed Mobility  Bed Mobility 1: Supine to sitting  Level of Assistance 1: Moderate assistance (assist to RLE and trunk)  Bed Mobility Comments 1: HOB elevated and used R bed rail    Transfers  Technique 1: Sit to stand, Stand to sit  Transfer Device 1: Walker  Transfer Level of Assistance 1: Contact guard (cued for RLE position to maintain hip precautions)  Trials/Comments 1: x1 trial at EOB    Ambulation/Gait Training  Surface 1: Level tile  Device 1: Rolling walker  Assistance 1: Contact guard (Cued for smaller walker movements with tight turns.)  Comments/Distance (ft) 1: 50 ft with minimial step through pattern, slow velocity, and discontinuous movement. Steady gait.    Stairs  Prior to verbal instruction, pt stated correct LE sequencing to protect RLE during stair negotiaiton. She said she was familiar with the correct sequencing as she underwent bilat  TKAs.    Outcome Measures:  [facility] Basic Mobility  Turning from your back to your side while in a flat bed without using bedrails: A little  Moving from lying on your back to sitting on the side of a flat bed without using bedrails: A lot  Moving to and from bed to chair (including a wheelchair): A little  Standing up from a chair using your arms (e.g. wheelchair or bedside chair): A little  To walk in hospital room: A little  Climbing 3-5 steps with railing: A little  Basic Mobility - Total Score: 17        Education Documentation  Precautions, taught by Darleen Angeles PT at 5/20/2024  3:53 PM.  Learner: Patient  Readiness: Acceptance  Method: Explanation  Response: Verbalizes Understanding, Needs Reinforcement, Demonstrated Understanding    Home Exercise Program, taught by Darleen Angeles PT at 5/20/2024  3:53 PM.  Learner: Patient  Readiness: Acceptance  Method: Explanation  Response: Verbalizes Understanding, Needs Reinforcement, Demonstrated Understanding    Mobility Training, taught by Darleen Angeles PT at 5/20/2024  3:53 PM.  Learner: Patient  Readiness: Acceptance  Method: Explanation  Response: Verbalizes Understanding, Needs Reinforcement, Demonstrated Understanding    Education Comments  No comments found.

## 2024-05-20 NOTE — OP NOTE
Arthroplasty Total Hip  85290 - DC ARTHRP ACETBLR/PROX FEM PROSTC AGRFT/ALGRFT   Operative Note     Date: 2024  OR Location: [facility]    Name: Monse Aguilera, : 1960, Age: 63 y.o., MRN: [id number], Sex: female    PRE Diagnosis  Right hip osteoarthritis  Morbid obesity BMI 37    POST Diagnosis  Same    Procedures  Right total hip arthroplasty    Surgeons      * Josiah Salgado - Primary    Resident/Fellow/Other Assistant:  dave Pabon dan    Procedure Summary  Implants:  Styker Total Hip: Trident 2 Acetabular Component 52; X3 Acetabular Liner Size 36-F, 10 degree aligned at 3:00; Accolade 2 femoral stem size 5, Biolox delta femoral head size 36-2.5     Anesthesia: Spinal with Andrei  ASA: II  Anesthesia Staff:   Anesthesiologist: José Mckeon MD  Estimated Blood Loss: 250 mL    Specimen: No specimens collected     Staff:   Circulator: María St RN; Ashly Muro RN  Scrub Person: Travis Conner; Romel Pabon         Drains and/or Catheters:   None    Tourniquet Times:                Findings: Prior lumbar fusion.  Severe degenerative change of the hip joint space.  Anteversion of the native acetabulum.  Macerated tearing of the labrum with detachment along the 12 to 4 o'clock position.  Stable implantation of the above components.  Full extension external rotation without impingement.  Hip flexion in a neutral plane to 100 degrees.  At position hip flexion 45 adduction 20 I can internally rotate 80 degrees prior to any instability.  There is no anterior impingement.  This appeared to restore leg lengths.    Indications: Monse Aguilera is an 63 y.o. female who is having surgery for PAIN.   Patient presented with pain and symptoms of her right hip radiating to her knee.  Pain not relieved with conservative care including injections.  Affecting daily activities.  She had reached a point of disability.  She wished to proceed with a hip replacement surgery.  I discussed  with her the risk benefits complications and alternatives and she agreed.  X-rays revealed severe degenerative change with lateral subluxation and cam style deformity the proximal femur.  Patient was also adamant about returning home same day.  I discussed with the patient the risks, benefits, alternatives, complications of a total hip replacement.  The risks, including but not limited to, infection, deep vein thrombosis, pulmonary embolism, recurring dislocation, leg-length discrepancy, periprosthetic fracture, damage to ligaments tendons neurovascular structures were discussed.  Despite these risks they elected to proceed.      Procedure Details: Medications: 2 g IV, Ozempic acid 2 g IV, Decadron 10 milligrams IV    Patient was seen and evaluated in the preoperative area.  The right leg was marked as the operative extremity.  They were then brought back to the operating room.  Anesthesia controlled the head neck airway and administered a general anesthetic.  A Lopez catheter was inserted.  I positioned them in the lateral decubitus position on a pegboard.  All bony prominences were well padded.  I proceeded to wash the leg with chlorhexidine and alcohol.  I then prepped with a ChloraPrep.  We then draped in the usual fashion.  An operative time-out was performed.    I marked out the lateral trochanteric margin as well as a curvilinear incision in line with the Kocher Langenbach approach to the hip joint.  I proceeded to make the incision with the scalpel and carried down the dissection with the Bovie to the level of the fascia.  I incised the fascia in line with the skin incision.  I bluntly split the fibers of the gluteus melina.  I palpated the posterior flap and inserted a Charnley retractor.  I then extended and internally rotated the hip joint.  I identified the abductor tendon protected this with a narrow Fátima.  I then released the short external rotators from the posterior edge of the femur including the  capsule.  I then T'd the capsule to the level of the piriformis.  I was able to atraumatically dislocate the head.  I brought this into the incision with a femoral neck elevator.  I palpated the lesser trochanter marked out an approximate osteotomy site based off of preoperative templating.  I protected the soft tissues with a Hohmann retractor.  I then used an oscillating saw to resect the femoral head.  I removed the head with a tenaculum.  I sized it with an outer diameter of 49 millimeters.  I proceeded now to retract the femur anteriorly with a curved anterior Hohmann.  Placed a straight Hohmann retractor posteriorly.  I released the inferior capsule.  I removed the labrum circumferentially and removed the ligamentum teres as well to identify the floor of the acetabulum.  I then sequentially reamed from a size 46 millimeter Reamer to a size 52 millimeter Reamer.  I felt the last Reamer had good anterior posterior fit in the acetabulum.  There is good bleeding cancellous bone.  This was thoroughly irrigated at this time.  I then selected the acetabular component and aligned it with the screw holes in the posterior superior quadrant.  I impacted in approximate position of 40 degrees of abduction, 15 degrees of anteversion.  The component was seated fully and checked with a tonsil.  I then placed 1 cancellous screw in the posterior superior quadrant palpating the drill hole prior to insertion.  I placed a trial acetabular liner returned to the femur at this time.    I brought the femur back into the incision with a femoral neck elevator.  I debrided the piriformis fossa and then used a box osteotome and a canal finder to enter the proximal femur.  I then sequentially broached from a size 0 to a size 5. I felt the last size broach had good stability to both axial and rotational loading in the proximal femur.  I now selected to 127 degree neck shaft angle trial component with a 0 neck length head.  I was unable to  reduce.  I trialed with a -5.  I did a trial reduction.  With extension and external rotation she seemed to be subluxated anteriorly.  I removed the liner trial and placed a 10 degree liner at the 3 o'clock position.  I then trialed again with the -2.5.  There was no further subluxation.  I was satisfied with the initial trialing and component positioning.      I now retracted the femur anteriorly.  I placed a curved anterior Hohmann retractor to hold position.  I removed the acetabular trial liner and cleaned the locking mechanism.  I then irrigated.  I then seated the trident X 3 10 degree polyethylene insert.  I aligned it at 3:00.  The fit was secure circumferentially.  I now returned to the femur.  I checked the fit of the broach and felt that there was still good rotational stability.  I removed this and thoroughly irrigated the femoral canal.  There was good cortical contact medially.  There is no apparent fracture of the calcar.  I selected the Accolade 2 127 degree neck shaft angle size 5 femoral component.  I impacted to the same level as the broach.  I then trialed with a -2.5 millimeter femoral head and felt that this best restored the leg lengths as well as stability with range of motion.  I removed the head trial and cleaned the trunnion.  I then impacted the femoral head component.  The fit was secure.  I did a final reduction at this time.  I tested the hip kinematics and was satisfied with range of motion and stability as noted above.      I now thoroughly irrigated with a diluted Betadine solution.  I washed this out with normal saline.  I obtained hemostasis.  I then did a posterior capsular closure through bone drill holes with #2 FiberWire suture.  The repair was excellent.  I then removed the Charnley retractors.  I irrigated and then closed the fascia with #2 Ethibond suture for a watertight seal.  I then irrigated the subcutaneous tissue closed in a layered fashion with a running 0 Vicryl  suture, buried interrupted 2-0 Vicryl, running Monocryl 4-0 suture.  Dermabond and a silver impregnated dressing were applied.  They tolerated the procedure well and were awakened and taken to PACU in stable condition.    The patient will be attempted to discharge same day after evaluation of PT in the recovery area.  Complications:  None; patient tolerated the procedure well.    Disposition: PACU - hemodynamically stable.  Condition: stable             Attending Attestation: I performed the procedure.    Josiah Salgado  Phone Number: [phone]

## 2024-05-20 NOTE — ANESTHESIA POSTPROCEDURE EVALUATION
Patient: Monse Aguilera    Procedure Summary       Date: 05/20/24 Room / Location: [facility] OR 08 / Virtual [facility]    Anesthesia Start: 0754 Anesthesia Stop: 1002    Procedure: Arthroplasty Total Hip (Right: Hip) Diagnosis:       Primary osteoarthritis of right hip      (PAIN)    Surgeons: Josiah Salgado MD Responsible Provider: José Mckeon MD    Anesthesia Type: MAC, spinal ASA Status: 2            Anesthesia Type: MAC, spinal    Vitals Value Taken Time   /68 05/20/24 1234   Temp 36 °C (96.8 °F) 05/20/24 0957   Pulse 52 05/20/24 1252   Resp 14 05/20/24 1252   SpO2 96 % 05/20/24 1252   Vitals shown include unfiled device data.    Anesthesia Post Evaluation    Patient location during evaluation: PACU  Patient participation: complete - patient participated  Level of consciousness: awake and alert  Pain score: 1  Pain management: satisfactory to patient  Airway patency: patent  Two or more strategies used to mitigate risk of obstructive sleep apnea  Cardiovascular status: acceptable  Respiratory status: acceptable  Hydration status: acceptable  Postoperative Nausea and Vomiting: none  Comments: Spinal regressing expectantly.        There were no known notable events for this encounter.

## 2024-05-20 NOTE — ANESTHESIA PREPROCEDURE EVALUATION
Patient: Monse Aguilera    Procedure Information       Date/Time: 24    Procedure: Arthroplasty Total Hip (Right: Hip) - JEFFERY, STERILE MAGNENT    [facility]    Surgeons: Josiah Salgado MD            Relevant Problems   Cardiac   (+) CPVT (catecholaminergic polymorphic ventricular tachycardia) (Multi)   (+) Electrocardiogram abnormal   (+) Essential hypertension   (+) Hyperlipidemia   (+) Long QT syndrome      Neuro   (+) Depressive disorder      /Renal   (+) Calculus of kidney      Endocrine   (+) Hypothyroidism   (+) Obesity, morbid (Multi)      Musculoskeletal   (+) Osteoarthritis of right shoulder     Past Surgical History:   Procedure Laterality Date    BACK SURGERY      BUNIONECTOMY      CARDIAC DEFIBRILLATOR PLACEMENT Left 2005     SECTION, LOW TRANSVERSE      ENDOMETRIAL ABLATION      FL GUIDED INJECTION HIP RIGHT Right 2023    FL GUIDED INJECTION HIP RIGHT 2023 [facility]    FL GUIDED INJECTION HIP RIGHT  2024    INSERT / REPLACE / REMOVE PACEMAKER      ICD placement,  Not Pacemaker    JOINT REPLACEMENT  ,  &     Bilat Knees/ Right Shoulder    LUMBAR FUSION      LUMBAR LAMINECTOMY      OOPHORECTOMY      OTHER SURGICAL HISTORY      Generator change in ICD    SPINAL FUSION  2014    L1-L5 rods/screws    TOTAL KNEE ARTHROPLASTY Bilateral     TOTAL SHOULDER ARTHROPLASTY Right 10/2023    TUBAL LIGATION           Clinical information reviewed:                   NPO Detail:  No data recorded     Physical Exam    Airway  Mallampati: II  TM distance: >3 FB  Neck ROM: full     Cardiovascular   Comments: deferred   Dental    Pulmonary   Comments: deferred   Abdominal     Comments: deferred           Anesthesia Plan    History of general anesthesia?: yes  History of complications of general anesthesia?: no    ASA 2     MAC and spinal     The patient is a current smoker.  Patient was previously instructed to abstain from smoking on day  of procedure.  Patient did not smoke on day of procedure.    intravenous induction   Postoperative administration of opioids is intended.  Anesthetic plan and risks discussed with patient.

## 2024-05-20 NOTE — ANESTHESIA PROCEDURE NOTES
"Spinal Block    Patient location during procedure: OR  Start time: 5/20/2024 8:09 AM  End time: 5/20/2024 8:14 AM  Reason for block: primary anesthetic  Staffing  Performed: attending   Authorized by: José Mckeon MD    Performed by: José Mckeon MD    Preanesthetic Checklist  Completed: patient identified, IV checked, risks and benefits discussed, surgical consent, monitors and equipment checked, pre-op evaluation, timeout performed and sterile techniques followed  Block Timeout  RN/Licensed healthcare professional reads aloud to the Anesthesia provider and entire team: Patient identity, procedure with side and site, patient position, and as applicable the availability of implants/special equipment/special requirements.    Timeout performed at: 5/20/2024 8:09 AM  Spinal Block  Patient position: sitting  Prep: Betadine  Sterility prep: drape, cap, gloves, hand hygiene and mask  Sedation level: light sedation  Patient monitoring: blood pressure and continuous pulse oximetry  Approach: midline  Vertebral space: L2-3  Injection technique: single-shot  Needle  Needle type: Yony   Needle gauge: 22 G  Needle length: 3.5 in  Free flowing CSF: yes    Assessment bilateral  Block outcome: patient comfortable  Procedure assessment: patient sedated but conversant throughout procedure and patient tolerated procedure well with no immediate complications  Additional Notes  Lidocaine 1% local up to 5mL given subcutaneously to numb the tract.    Dose of \"spinal\" bupivacaine noted in intraoperative record          "

## 2024-05-20 NOTE — POST-PROCEDURE NOTE
Patient states has walker, crutches and cane at home.  Has Icebag at home and is. Spouse called for ride.

## 2024-05-21 ENCOUNTER — HOME CARE VISIT (OUTPATIENT)
Dept: HOME HEALTH SERVICES | Facility: HOME HEALTH | Age: 64
End: 2024-05-21
Payer: MEDICARE

## 2024-05-21 VITALS
HEART RATE: 62 BPM | OXYGEN SATURATION: 94 % | TEMPERATURE: 98.3 F | DIASTOLIC BLOOD PRESSURE: 70 MMHG | RESPIRATION RATE: 18 BRPM | SYSTOLIC BLOOD PRESSURE: 112 MMHG

## 2024-05-21 PROCEDURE — G0151 HHCP-SERV OF PT,EA 15 MIN: HCPCS | Mod: HHH

## 2024-05-21 PROCEDURE — 1090000002 HH PPS REVENUE DEBIT

## 2024-05-21 PROCEDURE — 1090000001 HH PPS REVENUE CREDIT

## 2024-05-21 PROCEDURE — 0023 HH SOC

## 2024-05-21 PROCEDURE — 169592 NO-PAY CLAIM PROCEDURE

## 2024-05-21 SDOH — HEALTH STABILITY: PHYSICAL HEALTH
EXERCISE COMMENTS: PT INSTRUCTED PATIENT IN EXERCISES PER POSTERIOR THA PROTOCOL 1X10:  - ANKLE PUMPS  - GLUT SETS  - QUAD SETS  - HEELSLIDES  - HIP ABDUCTION  - SAQ    PT ALSO EDUCATED PATIENT ON HOW TO PROGRESS EXERCISES OVER THE NEXT TWO WEEKS.    - LAQ

## 2024-05-21 ASSESSMENT — ENCOUNTER SYMPTOMS
HIGHEST PAIN SEVERITY IN PAST 24 HOURS: 10/10
PAIN: 1
PAIN LOCATION: RIGHT HIP
SUBJECTIVE PAIN PROGRESSION: WAXING AND WANING
LOWEST PAIN SEVERITY IN PAST 24 HOURS: 0/10
PERSON REPORTING PAIN: PATIENT
PAIN LOCATION - PAIN SEVERITY: 0/10

## 2024-05-21 ASSESSMENT — ACTIVITIES OF DAILY LIVING (ADL)
ENTERING_EXITING_HOME: STAND BY ASSIST
OASIS_M1830: 04
AMBULATION ASSISTANCE ON FLAT SURFACES: 1

## 2024-05-22 ENCOUNTER — HOME CARE VISIT (OUTPATIENT)
Dept: HOME HEALTH SERVICES | Facility: HOME HEALTH | Age: 64
End: 2024-05-22
Payer: MEDICARE

## 2024-05-22 PROCEDURE — 1090000002 HH PPS REVENUE DEBIT

## 2024-05-22 PROCEDURE — 1090000001 HH PPS REVENUE CREDIT

## 2024-05-23 PROCEDURE — 1090000001 HH PPS REVENUE CREDIT

## 2024-05-23 PROCEDURE — 1090000002 HH PPS REVENUE DEBIT

## 2024-05-24 PROCEDURE — 1090000001 HH PPS REVENUE CREDIT

## 2024-05-24 PROCEDURE — 1090000002 HH PPS REVENUE DEBIT

## 2024-05-25 PROCEDURE — 1090000001 HH PPS REVENUE CREDIT

## 2024-05-25 PROCEDURE — 1090000002 HH PPS REVENUE DEBIT

## 2024-05-26 PROCEDURE — 1090000001 HH PPS REVENUE CREDIT

## 2024-05-26 PROCEDURE — 1090000002 HH PPS REVENUE DEBIT

## 2024-05-27 PROCEDURE — 1090000002 HH PPS REVENUE DEBIT

## 2024-05-27 PROCEDURE — 1090000001 HH PPS REVENUE CREDIT

## 2024-05-28 PROCEDURE — 1090000002 HH PPS REVENUE DEBIT

## 2024-05-28 PROCEDURE — 1090000001 HH PPS REVENUE CREDIT

## 2024-05-29 PROCEDURE — 1090000002 HH PPS REVENUE DEBIT

## 2024-05-29 PROCEDURE — 1090000001 HH PPS REVENUE CREDIT

## 2024-05-30 PROCEDURE — 1090000002 HH PPS REVENUE DEBIT

## 2024-05-30 PROCEDURE — 1090000001 HH PPS REVENUE CREDIT

## 2024-05-31 PROCEDURE — 1090000001 HH PPS REVENUE CREDIT

## 2024-05-31 PROCEDURE — 1090000002 HH PPS REVENUE DEBIT

## 2024-06-01 PROCEDURE — 1090000002 HH PPS REVENUE DEBIT

## 2024-06-01 PROCEDURE — 1090000001 HH PPS REVENUE CREDIT

## 2024-06-02 PROCEDURE — 1090000001 HH PPS REVENUE CREDIT

## 2024-06-02 PROCEDURE — 1090000002 HH PPS REVENUE DEBIT

## 2024-06-03 PROCEDURE — 1090000002 HH PPS REVENUE DEBIT

## 2024-06-03 PROCEDURE — 1090000001 HH PPS REVENUE CREDIT

## 2024-06-04 PROCEDURE — 1090000001 HH PPS REVENUE CREDIT

## 2024-06-04 PROCEDURE — 1090000002 HH PPS REVENUE DEBIT

## 2024-06-05 PROCEDURE — 1090000001 HH PPS REVENUE CREDIT

## 2024-06-05 PROCEDURE — 1090000002 HH PPS REVENUE DEBIT

## 2024-06-06 PROCEDURE — 1090000001 HH PPS REVENUE CREDIT

## 2024-06-06 PROCEDURE — 1090000002 HH PPS REVENUE DEBIT

## 2024-06-07 PROCEDURE — 1090000001 HH PPS REVENUE CREDIT

## 2024-06-07 PROCEDURE — 1090000002 HH PPS REVENUE DEBIT

## 2024-06-08 PROCEDURE — 1090000002 HH PPS REVENUE DEBIT

## 2024-06-08 PROCEDURE — 1090000001 HH PPS REVENUE CREDIT

## 2024-06-09 PROCEDURE — 1090000002 HH PPS REVENUE DEBIT

## 2024-06-09 PROCEDURE — 1090000001 HH PPS REVENUE CREDIT

## 2024-06-10 PROCEDURE — 1090000002 HH PPS REVENUE DEBIT

## 2024-06-10 PROCEDURE — 1090000001 HH PPS REVENUE CREDIT

## 2024-06-11 ENCOUNTER — EVALUATION (OUTPATIENT)
Dept: PHYSICAL THERAPY | Facility: CLINIC | Age: 64
End: 2024-06-11
Payer: MEDICARE

## 2024-06-11 DIAGNOSIS — Z96.641 HISTORY OF TOTAL HIP REPLACEMENT, RIGHT: ICD-10-CM

## 2024-06-11 PROBLEM — M25.551 RIGHT HIP PAIN: Status: ACTIVE | Noted: 2024-06-11

## 2024-06-11 PROCEDURE — 97110 THERAPEUTIC EXERCISES: CPT | Mod: GP

## 2024-06-11 PROCEDURE — 97161 PT EVAL LOW COMPLEX 20 MIN: CPT | Mod: GP

## 2024-06-11 PROCEDURE — 97140 MANUAL THERAPY 1/> REGIONS: CPT | Mod: GP

## 2024-06-11 NOTE — PROGRESS NOTES
"Physical Therapy    Physical Therapy Evaluation    Patient Name: Monse Aguilera  MRN: [id number]  Today's Date: 6/11/2024  Time Calculation  Start Time: 0930  Stop Time: 1010  Time Calculation (min): 40 min  Visit 1/4    Assessment:  PT Assessment  PT Assessment Results: Decreased strength, Decreased range of motion, Impaired balance, Decreased endurance, Decreased mobility, Decreased coordination, Pain, Orthopedic restrictions  Rehab Prognosis: Good pt is a 63 year old F who participated in a physical therapy evaluation today due to s/p R THOMAS . her impairments include;  balance deficits, tenderness, strength, pain, swelling, ROM deficits, motor control. Due to these impairments, pt has the following functional limitations Gait deviations, increased fall risk,  stair negotiation, transfers, performing household/recreational/ occupational activities, and performing ADLs]. Pt will benefit from continued skilled physical therapy to address above impairments and progress toward therapy related goals.         RFV: R THOMAS,   DOS:05/20  Pain: \" no pain just stiffness\"   Description of pain: stiff  Precautions: standard THOMAS precautions ( posterior)  Acute/Chronic: Acute   Functional limitations; gait, stairs, transfers, etc       Hip Palpation/Joint Mobility  Palpation/Joint Mobility Comment: tenderness noted along anterior portion R knee  Hip PROM  L WFL  R WFL within parameters   Strength  RLE >3/5   LLE 5/5  Gait  Gait Comment: Antalgic gait, ambulates with standard cane  Stairs:  Step to pattern noted            Treatments:  Access Code: [email or web address]  URL: [email or web address]  Date: 06/11/2024  Prepared by: Keila Gray    Exercises  - Supine Bridge  - 1 x daily - 7 x weekly - 3 sets - 10 reps  - Sit to Stand  - 1 x daily - 7 x weekly - 3 sets - 10 reps  - Standing Single Leg Stance with Counter Support  - 1 x daily - 7 x weekly - 3 sets - 30 hold  - Standing Hip Abduction with Counter Support  - 1 x daily - 7 x " weekly - 3 sets - 10 reps  - Mini Squat with Counter Support  - 1 x daily - 7 x weekly - 3 sets - 10 reps     Manual:   PROM of HIP flexion, ER, ABD       EDUCATION:  Outpatient Education  Individual(s) Educated: Patient  Education Provided: Anatomy, Home Exercise Program, Physiology, Post-Op Precautions  Risk and Benefits Discussed with Patient/Caregiver/Other: yes  Patient/Caregiver Demonstrated Understanding: yes  Plan of Care Discussed and Agreed Upon: yes  Patient Response to Education: Patient/Caregiver Asked Appropriate Questions, Patient/Caregiver Performed Return Demonstration of Exercises/Activities, Patient/Caregiver Verbalized Understanding of Information         Plan  Treatment/Interventions: Education/ Instruction, Electrical stimulation, Hot pack, Manual therapy, Neuromuscular re-education, Therapeutic activities, Therapeutic exercises  PT Plan: Skilled PT  PT Frequency: 2 times per week  Duration: 4  Onset Date: 05/20/24  Certification Period Start Date: 06/11/24  Certification Period End Date: 09/09/24  Number of Treatments Authorized: [place]  Rehab Potential: Good  Plan of Care Agreement: Patient    Current Problem  1. History of total hip replacement, right  Referral to Physical Therapy    Follow Up In Physical Therapy            OP EDUCATION:  Outpatient Education  Individual(s) Educated: Patient  Education Provided: Anatomy, Home Exercise Program  Risk and Benefits Discussed with Patient/Caregiver/Other: yes  Patient/Caregiver Demonstrated Understanding: yes  Plan of Care Discussed and Agreed Upon: yes  Patient Response to Education: Patient/Caregiver Verbalized Understanding of Information, Patient/Caregiver Performed Return Demonstration of Exercises/Activities, Patient/Caregiver Asked Appropriate Questions    Goals:  Active       PT Problem       Pt will demonstrate 4+/5 B/L LE strength        Start:  06/11/24    Expected End:  09/09/24            Pt will be indep with HEP         Start:   06/11/24    Expected End:  09/09/24            Pt will demonstrate symmetrical AROM in B/L LEs        Start:  06/11/24    Expected End:  09/09/24            pt will report no greater than 2/10 pain for 3 consecutive days       Start:  06/11/24    Expected End:  09/09/24

## 2024-06-13 ENCOUNTER — APPOINTMENT (OUTPATIENT)
Dept: PRIMARY CARE | Facility: CLINIC | Age: 64
End: 2024-06-13
Payer: MEDICARE

## 2024-06-18 ENCOUNTER — APPOINTMENT (OUTPATIENT)
Dept: PHYSICAL THERAPY | Facility: CLINIC | Age: 64
End: 2024-06-18
Payer: MEDICARE

## 2024-06-19 ENCOUNTER — TREATMENT (OUTPATIENT)
Dept: PHYSICAL THERAPY | Facility: CLINIC | Age: 64
End: 2024-06-19
Payer: MEDICARE

## 2024-06-19 DIAGNOSIS — Z96.641 HISTORY OF TOTAL HIP REPLACEMENT, RIGHT: ICD-10-CM

## 2024-06-19 PROCEDURE — 97110 THERAPEUTIC EXERCISES: CPT | Mod: GP,CQ

## 2024-06-19 PROCEDURE — 97140 MANUAL THERAPY 1/> REGIONS: CPT | Mod: GP,CQ

## 2024-06-19 NOTE — PROGRESS NOTES
Physical Therapy Treatment    Patient Name: Monse Aguilera  MRN: [id number]  Today's Date: 6/19/2024  Time Calculation  Start Time: 0755  Stop Time: 0835  Time Calculation (min): 40 min  PT Therapeutic Procedures Time Entry  Manual Therapy Time Entry: 10  Therapeutic Exercise Time Entry: 30    Insurance:  Visit number: 2 of 8  Authorization info: MEDICARE A/B - NO AUTH / [place] / $564.13 used 2024 PT/ST.  [employer] SUPP Plan N paid thru 6/1/24 PER [place] [facility] [place] W/S / ds 6/10/24.  Insurance Type: Payor: MEDICARE / Plan: MEDICARE PART A AND B / Product Type: *No Product type* /     Current Problem   1. History of total hip replacement, right  Follow Up In Physical Therapy          Subjective   General    Pt reports that her back bothers her more then her hip.  Precautions:   R THR  Pain    R hip 0/10, LBP 5/10  Post Treatment Pain Level better    Objective   Pt presents with a R posterior rotation and piriformis tightness    Treatments:  Therapeutic Exercise:   Nu-step x 7'  STS 3 x 10  ABD bracing 3 x 10  Bridges 3 x 10  Reverse curls 3 x 10  ABD bracing with MIP 5 x 10  Supine slow leg fallout  Manual:  MET to correct rotation  Piriformis release  Assessment   Assessment:    Pt tolerated session fairly well. Reduction in low back pain.    Plan:    Progress POC    OP EDUCATION:   Added to HEP    Goals:   Active       PT Problem       Pt will demonstrate 4+/5 B/L LE strength  (Progressing)       Start:  06/11/24    Expected End:  09/09/24            Pt will be indep with HEP   (Progressing)       Start:  06/11/24    Expected End:  09/09/24            Pt will demonstrate symmetrical AROM in B/L LEs  (Progressing)       Start:  06/11/24    Expected End:  09/09/24            pt will report no greater than 2/10 pain for 3 consecutive days (Progressing)       Start:  06/11/24    Expected End:  09/09/24

## 2024-06-26 ENCOUNTER — TREATMENT (OUTPATIENT)
Dept: PHYSICAL THERAPY | Facility: CLINIC | Age: 64
End: 2024-06-26
Payer: MEDICARE

## 2024-06-26 ENCOUNTER — APPOINTMENT (OUTPATIENT)
Dept: PHYSICAL THERAPY | Facility: CLINIC | Age: 64
End: 2024-06-26
Payer: MEDICARE

## 2024-06-26 DIAGNOSIS — Z96.641 HISTORY OF TOTAL HIP REPLACEMENT, RIGHT: ICD-10-CM

## 2024-06-26 PROCEDURE — 97110 THERAPEUTIC EXERCISES: CPT | Mod: GP

## 2024-06-26 NOTE — PROGRESS NOTES
Physical Therapy Treatment    Patient Name: Monse Aguilera  MRN: [id number]  Today's Date: 6/26/2024  Time Calculation  Start Time: 0845  Stop Time: 0925  Time Calculation (min): 40 min  PT Therapeutic Procedures Time Entry  Therapeutic Exercise Time Entry: 40    Insurance:  Visit number: 3 of 8  Authorization info: MEDICARE A/B - NO AUTH / [place] / $564.13 used 2024 PT/ST.  [employer] SU[employer] Plan N paid thru 6/1/24 PER [place] [facility] [place] W/S / ds 6/10/24.  Insurance Type: Payor: MEDICARE / Plan: MEDICARE PART A AND B / Product Type: *No Product type* /     Current Problem   1. History of total hip replacement, right  Follow Up In Physical Therapy          Subjective   General    Pt reports that her back bothers her more then her hip. Presents with some groin pain  Precautions:   R THR  Pain    R hip 0/10, LBP 5/10  Post Treatment Pain Level better    Objective   Pt presents with a R posterior rotation and piriformis tightness    Treatments:  Therapeutic Exercise:   Nu-step x 7'  Leg press 60# 3 x 10  Sit to stand progression 2 --> 1 --> chair only  Step ups 2 x 10  Lateral step downs 2 x 10  Step downs 2  x10   Bridges 3 x 10     Assessment   Assessment:    Emphasis of todays treatment was to focus on general strengthening with CKC activities  . Pt will cont to progress with skilled PT to continue to address above impairments       Plan:    Progress POC    OP EDUCATION:   Added to HEP    Goals:   Active       PT Problem       Pt will demonstrate 4+/5 B/L LE strength  (Progressing)       Start:  06/11/24    Expected End:  09/09/24            Pt will be indep with HEP   (Progressing)       Start:  06/11/24    Expected End:  09/09/24            Pt will demonstrate symmetrical AROM in B/L LEs  (Progressing)       Start:  06/11/24    Expected End:  09/09/24            pt will report no greater than 2/10 pain for 3 consecutive days (Progressing)       Start:  06/11/24    Expected End:  09/09/24

## 2024-06-28 LAB — NONINV COLON CA DNA+OCC BLD SCRN STL QL: NEGATIVE

## 2024-07-02 ENCOUNTER — TREATMENT (OUTPATIENT)
Dept: PHYSICAL THERAPY | Facility: CLINIC | Age: 64
End: 2024-07-02
Payer: MEDICARE

## 2024-07-02 DIAGNOSIS — Z96.641 HISTORY OF TOTAL HIP REPLACEMENT, RIGHT: ICD-10-CM

## 2024-07-02 PROCEDURE — 97110 THERAPEUTIC EXERCISES: CPT | Mod: GP

## 2024-07-02 NOTE — PROGRESS NOTES
Physical Therapy Treatment    Patient Name: Monse Aguilera  MRN: [id number]  Today's Date: 07/01/2024  Time Calculation  Start Time: 0845  Stop Time: 0925  Time Calculation (min): 40 min  PT Therapeutic Procedures Time Entry  Therapeutic Exercise Time Entry: 40    Insurance:  Visit number: 4 of 8  Authorization info: MEDICARE A/B - NO AUTH / [place] / $564.13 used 2024 PT/ST.  [employer] [place]  SUPP Plan N paid thru 6/1/24 PER [place] [facility] [place] W/S / ds 6/10/24.  Insurance Type: Payor: MEDICARE / Plan: MEDICARE PART A AND B / Product Type: *No Product type* /     Current Problem   1. History of total hip replacement, right  Follow Up In Physical Therapy          Subjective   General   Pt reports that R hip feels fine, mostly having low back pain   Precautions:   R THR  Pain    R hip 0/10, LBP 5/10  Post Treatment Pain Level better    Objective   Strength >3/5 BL  AROM: WFL ( within parameters)  Palpation; min tenderness noted along lateral aspect of R hip  Stairs: WFL  Transfers: WFL  Gait; ambulates with standard cane but demonstrates ability to ambulate without, slightly forward posture     Treatments:  Therapeutic Exercise:   Nu-step x 7'  Leg press 60# 3 x 10  Sit to stand progression 2 --> 1 --> chair only  Step ups 2 x 10  Lateral step downs 2 x 10  Step downs 2  x10   Bridges 3 x 10     Reassessment   Review HEP    Assessment   Assessment:    Pt presents to the clinic today for a formal reassessment, pt would like to make today her last formal visit of PT- feels comfortable with HEP provided and is indep with HEP. Pt is demonstrating significant improvement with respect to strength, AROM, and tolerance to activity. Pt has no pain or functional limitations and this time and has successfully met all of her therapeutic goals. Pt will be D.C from PT at this time.        Plan:    DC PT    OP EDUCATION:   Added to HEP    Goals:   Active       PT Problem       Pt will demonstrate 4+/5 B/L LE strength  (Progressing)        Start:  06/11/24    Expected End:  09/09/24            Pt will be indep with HEP   (Progressing)       Start:  06/11/24    Expected End:  09/09/24            Pt will demonstrate symmetrical AROM in B/L LEs  (Progressing)       Start:  06/11/24    Expected End:  09/09/24            pt will report no greater than 2/10 pain for 3 consecutive days (Progressing)       Start:  06/11/24    Expected End:  09/09/24

## 2024-07-03 ENCOUNTER — APPOINTMENT (OUTPATIENT)
Dept: PHYSICAL THERAPY | Facility: CLINIC | Age: 64
End: 2024-07-03
Payer: MEDICARE

## 2024-07-11 ENCOUNTER — APPOINTMENT (OUTPATIENT)
Dept: PHYSICAL THERAPY | Facility: CLINIC | Age: 64
End: 2024-07-11
Payer: MEDICARE

## 2024-07-26 DIAGNOSIS — E03.9 ACQUIRED HYPOTHYROIDISM: ICD-10-CM

## 2024-07-26 RX ORDER — LEVOTHYROXINE SODIUM 175 UG/1
175 TABLET ORAL
Qty: 90 TABLET | Refills: 0 | Status: SHIPPED | OUTPATIENT
Start: 2024-07-26 | End: 2024-10-24

## 2024-09-23 ENCOUNTER — DOCUMENTATION (OUTPATIENT)
Dept: PHYSICAL THERAPY | Facility: CLINIC | Age: 64
End: 2024-09-23
Payer: MEDICARE

## 2024-09-23 NOTE — PROGRESS NOTES
Physical Therapy    Discharge Summary    Name: Monse Aguilera  MRN: [id number]  : 1960  Date: 24    Discharge Summary: PT    Discharge Information: Date of discharge       Rehab Discharge Reason: Achieved all and/or the most significant goals(s)

## 2024-09-28 DIAGNOSIS — F32.A DEPRESSIVE DISORDER: ICD-10-CM

## 2024-09-28 DIAGNOSIS — E78.2 MIXED HYPERLIPIDEMIA: ICD-10-CM

## 2024-09-30 RX ORDER — ATORVASTATIN CALCIUM 40 MG/1
40 TABLET, FILM COATED ORAL DAILY
Qty: 90 TABLET | Refills: 1 | Status: SHIPPED | OUTPATIENT
Start: 2024-09-30

## 2024-09-30 RX ORDER — SERTRALINE HYDROCHLORIDE 100 MG/1
TABLET, FILM COATED ORAL
Qty: 180 TABLET | Refills: 1 | Status: SHIPPED | OUTPATIENT
Start: 2024-09-30

## 2024-10-08 ENCOUNTER — APPOINTMENT (OUTPATIENT)
Dept: PRIMARY CARE | Facility: CLINIC | Age: 64
End: 2024-10-08
Payer: MEDICARE

## 2024-11-05 DIAGNOSIS — E03.9 ACQUIRED HYPOTHYROIDISM: ICD-10-CM

## 2024-11-05 RX ORDER — LEVOTHYROXINE SODIUM 175 UG/1
175 TABLET ORAL
Qty: 90 TABLET | Refills: 1 | Status: SHIPPED | OUTPATIENT
Start: 2024-11-05 | End: 2025-05-04

## 2024-12-30 DIAGNOSIS — I10 ESSENTIAL HYPERTENSION: Primary | ICD-10-CM

## 2024-12-30 RX ORDER — LOSARTAN POTASSIUM 100 MG/1
100 TABLET ORAL DAILY
Qty: 90 TABLET | Refills: 0 | Status: SHIPPED | OUTPATIENT
Start: 2024-12-30 | End: 2025-03-30

## 2025-01-28 ENCOUNTER — OFFICE VISIT (OUTPATIENT)
Dept: PRIMARY CARE | Facility: CLINIC | Age: 65
End: 2025-01-28
Payer: MEDICARE

## 2025-01-28 VITALS
SYSTOLIC BLOOD PRESSURE: 138 MMHG | BODY MASS INDEX: 35.96 KG/M2 | RESPIRATION RATE: 16 BRPM | DIASTOLIC BLOOD PRESSURE: 82 MMHG | OXYGEN SATURATION: 96 % | HEIGHT: 65 IN | WEIGHT: 215.8 LBS | HEART RATE: 68 BPM

## 2025-01-28 DIAGNOSIS — Z13.1 SCREENING FOR DIABETES MELLITUS (DM): ICD-10-CM

## 2025-01-28 DIAGNOSIS — E83.52 HYPERCALCEMIA: ICD-10-CM

## 2025-01-28 DIAGNOSIS — G43.E19 INTRACTABLE CHRONIC MIGRAINE WITH AURA AND WITHOUT STATUS MIGRAINOSUS: ICD-10-CM

## 2025-01-28 DIAGNOSIS — I47.29: ICD-10-CM

## 2025-01-28 DIAGNOSIS — F19.21 HISTORY OF SUBSTANCE DEPENDENCE (MULTI): ICD-10-CM

## 2025-01-28 DIAGNOSIS — E78.2 MIXED HYPERLIPIDEMIA: Primary | ICD-10-CM

## 2025-01-28 DIAGNOSIS — G89.4 CHRONIC PAIN SYNDROME: ICD-10-CM

## 2025-01-28 DIAGNOSIS — E03.9 ACQUIRED HYPOTHYROIDISM: ICD-10-CM

## 2025-01-28 DIAGNOSIS — E55.9 VITAMIN D DEFICIENCY: ICD-10-CM

## 2025-01-28 DIAGNOSIS — R91.8 MULTIPLE LUNG NODULES: ICD-10-CM

## 2025-01-28 DIAGNOSIS — E66.01 OBESITY, MORBID (MULTI): ICD-10-CM

## 2025-01-28 DIAGNOSIS — I10 ESSENTIAL HYPERTENSION: ICD-10-CM

## 2025-01-28 DIAGNOSIS — F32.A DEPRESSIVE DISORDER: ICD-10-CM

## 2025-01-28 DIAGNOSIS — Z12.31 SCREENING MAMMOGRAM, ENCOUNTER FOR: ICD-10-CM

## 2025-01-28 DIAGNOSIS — Z00.00 MEDICARE ANNUAL WELLNESS VISIT, SUBSEQUENT: ICD-10-CM

## 2025-01-28 DIAGNOSIS — Z87.891 PERSONAL HISTORY OF NICOTINE DEPENDENCE: ICD-10-CM

## 2025-01-28 PROCEDURE — 1159F MED LIST DOCD IN RCRD: CPT

## 2025-01-28 PROCEDURE — 3079F DIAST BP 80-89 MM HG: CPT

## 2025-01-28 PROCEDURE — 4004F PT TOBACCO SCREEN RCVD TLK: CPT

## 2025-01-28 PROCEDURE — 3008F BODY MASS INDEX DOCD: CPT

## 2025-01-28 PROCEDURE — G0439 PPPS, SUBSEQ VISIT: HCPCS

## 2025-01-28 PROCEDURE — 1170F FXNL STATUS ASSESSED: CPT

## 2025-01-28 PROCEDURE — 1125F AMNT PAIN NOTED PAIN PRSNT: CPT

## 2025-01-28 PROCEDURE — 3075F SYST BP GE 130 - 139MM HG: CPT

## 2025-01-28 PROCEDURE — 1157F ADVNC CARE PLAN IN RCRD: CPT

## 2025-01-28 PROCEDURE — 99215 OFFICE O/P EST HI 40 MIN: CPT

## 2025-01-28 PROCEDURE — 1124F ACP DISCUSS-NO DSCNMKR DOCD: CPT

## 2025-01-28 RX ORDER — SERTRALINE HYDROCHLORIDE 100 MG/1
200 TABLET, FILM COATED ORAL DAILY
COMMUNITY

## 2025-01-28 RX ORDER — SERTRALINE HYDROCHLORIDE 100 MG/1
100 TABLET, FILM COATED ORAL DAILY
Qty: 90 TABLET | Refills: 1 | Status: SHIPPED
Start: 2025-01-28 | End: 2025-01-28 | Stop reason: WASHOUT

## 2025-01-28 RX ORDER — LOSARTAN POTASSIUM 100 MG/1
100 TABLET ORAL DAILY
Qty: 90 TABLET | Refills: 1 | Status: SHIPPED | OUTPATIENT
Start: 2025-01-28 | End: 2025-07-27

## 2025-01-28 RX ORDER — BUPROPION HYDROCHLORIDE 300 MG/1
300 TABLET ORAL EVERY MORNING
Qty: 90 TABLET | Refills: 1 | Status: SHIPPED | OUTPATIENT
Start: 2025-01-28 | End: 2025-07-27

## 2025-01-28 RX ORDER — LEVOTHYROXINE SODIUM 175 UG/1
175 TABLET ORAL
Qty: 90 TABLET | Refills: 1 | Status: SHIPPED | OUTPATIENT
Start: 2025-01-28 | End: 2025-07-27

## 2025-01-28 RX ORDER — ATORVASTATIN CALCIUM 40 MG/1
40 TABLET, FILM COATED ORAL DAILY
Qty: 90 TABLET | Refills: 1 | Status: SHIPPED | OUTPATIENT
Start: 2025-01-28

## 2025-01-28 RX ORDER — PROPRANOLOL HYDROCHLORIDE 120 MG/1
240 CAPSULE, EXTENDED RELEASE ORAL DAILY
Qty: 180 CAPSULE | Refills: 1 | Status: SHIPPED | OUTPATIENT
Start: 2025-01-28 | End: 2025-07-27

## 2025-01-28 ASSESSMENT — ACTIVITIES OF DAILY LIVING (ADL)
PREPARING MEALS: INDEPENDENT
WALKS IN HOME: INDEPENDENT
PATIENT'S MEMORY ADEQUATE TO SAFELY COMPLETE DAILY ACTIVITIES?: YES
TAKING MEDICATION: INDEPENDENT
FEEDING YOURSELF: INDEPENDENT
EATING: INDEPENDENT
JUDGMENT_ADEQUATE_SAFELY_COMPLETE_DAILY_ACTIVITIES: YES
USING TRANSPORTATION: INDEPENDENT
DOING HOUSEWORK: INDEPENDENT
GROCERY SHOPPING: INDEPENDENT
NEEDS ASSISTANCE WITH FOOD: INDEPENDENT
STIL DRIVING: YES
USING TELEPHONE: INDEPENDENT
DRESSING YOURSELF: INDEPENDENT
PILL BOX USED: NO
TOILETING: INDEPENDENT
MANAGING FINANCES: INDEPENDENT
HEARING - RIGHT EAR: FUNCTIONAL
ADEQUATE_TO_COMPLETE_ADL: YES
BATHING: INDEPENDENT
GROOMING: INDEPENDENT

## 2025-01-28 ASSESSMENT — PATIENT HEALTH QUESTIONNAIRE - PHQ9
2. FEELING DOWN, DEPRESSED OR HOPELESS: NEARLY EVERY DAY
9. THOUGHTS THAT YOU WOULD BE BETTER OFF DEAD, OR OF HURTING YOURSELF: NOT AT ALL
1. LITTLE INTEREST OR PLEASURE IN DOING THINGS: NOT AT ALL
SUM OF ALL RESPONSES TO PHQ9 QUESTIONS 1 AND 2: 0
2. FEELING DOWN, DEPRESSED OR HOPELESS: NOT AT ALL
1. LITTLE INTEREST OR PLEASURE IN DOING THINGS: NEARLY EVERY DAY
SUM OF ALL RESPONSES TO PHQ QUESTIONS 1-9: 21
10. IF YOU CHECKED OFF ANY PROBLEMS, HOW DIFFICULT HAVE THESE PROBLEMS MADE IT FOR YOU TO DO YOUR WORK, TAKE CARE OF THINGS AT HOME, OR GET ALONG WITH OTHER PEOPLE: VERY DIFFICULT
SUM OF ALL RESPONSES TO PHQ9 QUESTIONS 1 AND 2: 6
6. FEELING BAD ABOUT YOURSELF - OR THAT YOU ARE A FAILURE OR HAVE LET YOURSELF OR YOUR FAMILY DOWN: NEARLY EVERY DAY
4. FEELING TIRED OR HAVING LITTLE ENERGY: NEARLY EVERY DAY
7. TROUBLE CONCENTRATING ON THINGS, SUCH AS READING THE NEWSPAPER OR WATCHING TELEVISION: NEARLY EVERY DAY
5. POOR APPETITE OR OVEREATING: NEARLY EVERY DAY
8. MOVING OR SPEAKING SO SLOWLY THAT OTHER PEOPLE COULD HAVE NOTICED. OR THE OPPOSITE, BEING SO FIGETY OR RESTLESS THAT YOU HAVE BEEN MOVING AROUND A LOT MORE THAN USUAL: NOT AT ALL
3. TROUBLE FALLING OR STAYING ASLEEP OR SLEEPING TOO MUCH: NEARLY EVERY DAY

## 2025-01-28 ASSESSMENT — ENCOUNTER SYMPTOMS
DEPRESSION: 0
LOSS OF SENSATION IN FEET: 0
OCCASIONAL FEELINGS OF UNSTEADINESS: 0

## 2025-01-28 ASSESSMENT — PAIN SCALES - GENERAL: PAINLEVEL_OUTOF10: 3

## 2025-01-28 NOTE — PATIENT INSTRUCTIONS
Assessment & Plan  Mixed hyperlipidemia    Decrease intake of saturated fats, fast food, sweets.  Increase intake of fresh fruit fresh vegetables and lean meats.  Increase healthy fats seeds, nuts, olive oil instead of butter.  walk 150 minutes/week for heart health.   Aim for 25-30 grams of fiber in your diet daily.  May consider adding Fish Oil supplement 1,200 mg per day or Omega 3 Supplement daily.       Essential hypertension    HYPERTENSION:   Decrease intake of processed foods, fast foods, lunch meat, canned soups, canned veggies.  Increase intake of fresh fruits, veggies, and lean meats. Monitor blood pressure at home, keep a log and bring this with you to your next appointment. Call the office if your blood pressure runs 150/90 or higher consistently.  Blood Pressure Technique:  Sit quietly in a chair for 5 minutes with back supported and feet on the floor  Then place left arm on a table or armrest so bicep area is at the same level of heart or left breast  Check three times in a row, disregard the highest reading and average the other two    Vitamin D deficiency    LAB Order/ BLOOD TESTS   I have ordered lab work for you to get done. This should be fasting. Nothing to eat or drink after midnight besides black tea,  black coffee, or water. If you do not hear from this office within two days of having your labs done, please call for your results.     Acquired hypothyroidism  LAB Order/ BLOOD TESTS   I have ordered lab work for you to get done. This should be fasting. Nothing to eat or drink after midnight besides black tea,  black coffee, or water. If you do not hear from this office within two days of having your labs done, please call for your results.     Hypercalcemia      Screening mammogram, encounter for  MAMMOGRAM For Breast Cancer Screening:  I have ordered you a mammogram to be done as soon as you are able.  We will call the results.    Multiple lung nodules    CT chest ordered for follow up lung  nodules   Medicare annual wellness visit, subsequent  LAB Order/ BLOOD TESTS   I have ordered lab work for you to get done. This should be fasting. Nothing to eat or drink after midnight besides black tea,  black coffee, or water. If you do not hear from this office within two days of having your labs done, please call for your results.     Screening for diabetes mellitus (DM)  LAB Order/ BLOOD TESTS   I have ordered lab work for you to get done. This should be fasting. Nothing to eat or drink after midnight besides black tea,  black coffee, or water. If you do not hear from this office within two days of having your labs done, please call for your results.     Body mass index (BMI) 35.0-35.9, adult    Orders:    CBC and Auto Differential; Future    Depressive disorder    Orders:    buPROPion XL (Wellbutrin XL) 300 mg 24 hr tablet; Take 1 tablet (300 mg) by mouth once daily in the morning.    sertraline (Zoloft) 100 mg tablet; Take 1 tablet (100 mg) by mouth once daily.    Intractable chronic migraine with aura and without status migrainosus    Orders:    propranolol LA (Inderal LA) 120 mg 24 hr capsule; Take 2 capsules (240 mg) by mouth once daily. Do not crush, chew, or split.    Personal history of nicotine dependence    Orders:    CT lung screening low dose; Future    Chronic pain syndrome    Orders:    Disability Placard

## 2025-01-28 NOTE — ASSESSMENT & PLAN NOTE
Decrease intake of saturated fats, fast food, sweets.  Increase intake of fresh fruit fresh vegetables and lean meats.  Increase healthy fats seeds, nuts, olive oil instead of butter.  walk 150 minutes/week for heart health.   Aim for 25-30 grams of fiber in your diet daily.  May consider adding Fish Oil supplement 1,200 mg per day or Omega 3 Supplement daily.

## 2025-01-28 NOTE — ASSESSMENT & PLAN NOTE
Orders:    propranolol LA (Inderal LA) 120 mg 24 hr capsule; Take 2 capsules (240 mg) by mouth once daily. Do not crush, chew, or split.

## 2025-01-28 NOTE — ASSESSMENT & PLAN NOTE
LAB Order/ BLOOD TESTS   I have ordered lab work for you to get done. This should be fasting. Nothing to eat or drink after midnight besides black tea,  black coffee, or water. If you do not hear from this office within two days of having your labs done, please call for your results.

## 2025-01-28 NOTE — PROGRESS NOTES
"Subjective   Reason for Visit: Monse Aguilera is an 64 y.o. female here for a Medicare Wellness visit.     Past Medical, Surgical, and Family History reviewed and updated in chart.    Reviewed all medications by prescribing practitioner or clinical pharmacist (such as prescriptions, OTCs, herbal therapies and supplements) and documented in the medical record.    HPI   Retired nurse.  Addressed concerns that I do not send refills for 1 year.  Discussed in depth my reasoning and logic behind it advised patient that she may find a different PCP if she would find that helpful or somebody that would prescribe annual medications and not asked to see the patient back in 6 months.  At this time patient stated she does not know what she is going to do.  But will schedule 6-month follow-up with me.  States she does not like doing 6-month follow-ups because she is so busy\"  Patient denies any falls, urgent care, ER, hospitalization, new diagnoses, since they were here last.  Denies any issues with chest pain, chest pressure, shortness of breath, constipation, diarrhea, blood in stool, urinary urgency, frequency, blood in urine, muscle weakness in arms and legs, numbness or tingling in fingers or toes.    Left shoulder replacement 2/2024  Right hip 5/2024.    HTN   Monitors BP at home generally in 140/80s or lower.    Patient Care Team:  LORI Montesinos as PCP - General (Internal Medicine)     Review of Systems  Review of Systems negative except as noted in HPI and Chief complaint.    Current Outpatient Medications:     acetaminophen (Tylenol) 500 mg tablet, Take 2 tablets (1,000 mg) by mouth every 8 hours if needed for mild pain (1 - 3)., Disp: , Rfl:     cholecalciferol (Vitamin D-3) 25 MCG (1000 UT) capsule, Take 1 capsule (25 mcg) by mouth once daily., Disp: , Rfl:     multivitamin tablet, Take 1 tablet by mouth once daily., Disp: , Rfl:     naproxen sodium (Aleve) 220 mg tablet, Take 2 tablets (440 mg) by mouth " "2 times daily (morning and late afternoon)., Disp: , Rfl:     atorvastatin (Lipitor) 40 mg tablet, Take 1 tablet (40 mg) by mouth once daily., Disp: 90 tablet, Rfl: 1    buPROPion XL (Wellbutrin XL) 300 mg 24 hr tablet, Take 1 tablet (300 mg) by mouth once daily in the morning., Disp: 90 tablet, Rfl: 1    levothyroxine (Synthroid, Levoxyl) 175 mcg tablet, Take 1 tablet (175 mcg) by mouth once daily in the morning. Take before meals., Disp: 90 tablet, Rfl: 1    losartan (Cozaar) 100 mg tablet, Take 1 tablet (100 mg) by mouth once daily. Last dose 5- 1100, Disp: 90 tablet, Rfl: 1    propranolol LA (Inderal LA) 120 mg 24 hr capsule, Take 2 capsules (240 mg) by mouth once daily. Do not crush, chew, or split., Disp: 180 capsule, Rfl: 1    sertraline (Zoloft) 100 mg tablet, Take 2 tablets (200 mg) by mouth once daily., Disp: , Rfl:     Objective   Vitals:  /82 (BP Location: Left arm, Patient Position: Sitting, BP Cuff Size: Adult)   Pulse 68   Resp 16   Ht 1.651 m (5' 5\")   Wt 97.9 kg (215 lb 12.8 oz)   SpO2 96%   BMI 35.91 kg/m²       Physical Exam  Vitals reviewed.   Constitutional:       General: She is not in acute distress.     Appearance: Normal appearance.   HENT:      Head: Normocephalic.      Right Ear: Tympanic membrane normal.      Left Ear: Tympanic membrane normal.      Nose: Nose normal.      Mouth/Throat:      Mouth: Mucous membranes are moist.      Pharynx: Oropharynx is clear.   Eyes:      Extraocular Movements: Extraocular movements intact.      Conjunctiva/sclera: Conjunctivae normal.      Pupils: Pupils are equal, round, and reactive to light.   Cardiovascular:      Rate and Rhythm: Normal rate.      Pulses: Normal pulses.      Heart sounds: Normal heart sounds.   Pulmonary:      Effort: Pulmonary effort is normal.      Breath sounds: Normal breath sounds.   Abdominal:      General: Abdomen is flat. Bowel sounds are normal.      Palpations: Abdomen is soft.   Musculoskeletal:         " General: Normal range of motion.   Skin:     General: Skin is warm and dry.      Capillary Refill: Capillary refill takes 2 to 3 seconds.   Neurological:      General: No focal deficit present.      Mental Status: She is alert and oriented to person, place, and time. Mental status is at baseline.   Psychiatric:         Mood and Affect: Mood normal.         Behavior: Behavior is cooperative.         Assessment & Plan  Mixed hyperlipidemia    Decrease intake of saturated fats, fast food, sweets.  Increase intake of fresh fruit fresh vegetables and lean meats.  Increase healthy fats seeds, nuts, olive oil instead of butter.  walk 150 minutes/week for heart health.   Aim for 25-30 grams of fiber in your diet daily.  May consider adding Fish Oil supplement 1,200 mg per day or Omega 3 Supplement daily.       Essential hypertension    HYPERTENSION:   Decrease intake of processed foods, fast foods, lunch meat, canned soups, canned veggies.  Increase intake of fresh fruits, veggies, and lean meats. Monitor blood pressure at home, keep a log and bring this with you to your next appointment. Call the office if your blood pressure runs 150/90 or higher consistently.  Blood Pressure Technique:  Sit quietly in a chair for 5 minutes with back supported and feet on the floor  Then place left arm on a table or armrest so bicep area is at the same level of heart or left breast  Check three times in a row, disregard the highest reading and average the other two    Vitamin D deficiency    LAB Order/ BLOOD TESTS   I have ordered lab work for you to get done. This should be fasting. Nothing to eat or drink after midnight besides black tea,  black coffee, or water. If you do not hear from this office within two days of having your labs done, please call for your results.     Acquired hypothyroidism  LAB Order/ BLOOD TESTS   I have ordered lab work for you to get done. This should be fasting. Nothing to eat or drink after midnight besides  black tea,  black coffee, or water. If you do not hear from this office within two days of having your labs done, please call for your results.     Hypercalcemia      Screening mammogram, encounter for  MAMMOGRAM For Breast Cancer Screening:  I have ordered you a mammogram to be done as soon as you are able.  We will call the results.    Multiple lung nodules    CT chest ordered for follow up lung nodules   Medicare annual wellness visit, subsequent  LAB Order/ BLOOD TESTS   I have ordered lab work for you to get done. This should be fasting. Nothing to eat or drink after midnight besides black tea,  black coffee, or water. If you do not hear from this office within two days of having your labs done, please call for your results.     Screening for diabetes mellitus (DM)  LAB Order/ BLOOD TESTS   I have ordered lab work for you to get done. This should be fasting. Nothing to eat or drink after midnight besides black tea,  black coffee, or water. If you do not hear from this office within two days of having your labs done, please call for your results.     Body mass index (BMI) 35.0-35.9, adult    Orders:    CBC and Auto Differential; Future    Depressive disorder    Orders:    buPROPion XL (Wellbutrin XL) 300 mg 24 hr tablet; Take 1 tablet (300 mg) by mouth once daily in the morning.    Intractable chronic migraine with aura and without status migrainosus    Orders:    propranolol LA (Inderal LA) 120 mg 24 hr capsule; Take 2 capsules (240 mg) by mouth once daily. Do not crush, chew, or split.    Personal history of nicotine dependence    Orders:    CT lung screening low dose; Future    Chronic pain syndrome    Orders:    Disability Placard       This note was dictated using DRAGON speech recognition software and was corrected for spelling or grammatical errors, but despite proofreading several typographical errors might be present that might affect the meaning of the content.  Genet Gaffney, CNP  Advanced care  planning was discussed with Monse Aguilera today. We reviewed code status, Medical Power of , and Living will. Pt has LW or POA.

## 2025-01-28 NOTE — ASSESSMENT & PLAN NOTE
Orders:    buPROPion XL (Wellbutrin XL) 300 mg 24 hr tablet; Take 1 tablet (300 mg) by mouth once daily in the morning.

## 2025-01-28 NOTE — ASSESSMENT & PLAN NOTE
HYPERTENSION:   Decrease intake of processed foods, fast foods, lunch meat, canned soups, canned veggies.  Increase intake of fresh fruits, veggies, and lean meats. Monitor blood pressure at home, keep a log and bring this with you to your next appointment. Call the office if your blood pressure runs 150/90 or higher consistently.  Blood Pressure Technique:  Sit quietly in a chair for 5 minutes with back supported and feet on the floor  Then place left arm on a table or armrest so bicep area is at the same level of heart or left breast  Check three times in a row, disregard the highest reading and average the other two

## 2025-02-27 ENCOUNTER — HOSPITAL ENCOUNTER (OUTPATIENT)
Dept: RADIOLOGY | Facility: HOSPITAL | Age: 65
Discharge: HOME | End: 2025-02-27
Payer: MEDICARE

## 2025-02-27 VITALS — HEIGHT: 65 IN | WEIGHT: 205 LBS | BODY MASS INDEX: 34.16 KG/M2

## 2025-02-27 DIAGNOSIS — R91.8 MULTIPLE LUNG NODULES: ICD-10-CM

## 2025-02-27 DIAGNOSIS — Z87.891 PERSONAL HISTORY OF NICOTINE DEPENDENCE: ICD-10-CM

## 2025-02-27 DIAGNOSIS — M19.011 PRIMARY OSTEOARTHRITIS, RIGHT SHOULDER: ICD-10-CM

## 2025-02-27 DIAGNOSIS — Z12.31 SCREENING MAMMOGRAM, ENCOUNTER FOR: ICD-10-CM

## 2025-02-27 DIAGNOSIS — Z96.611 PRESENCE OF RIGHT ARTIFICIAL SHOULDER JOINT: ICD-10-CM

## 2025-02-27 PROCEDURE — 77067 SCR MAMMO BI INCL CAD: CPT | Performed by: RADIOLOGY

## 2025-02-27 PROCEDURE — 71271 CT THORAX LUNG CANCER SCR C-: CPT | Performed by: RADIOLOGY

## 2025-02-27 PROCEDURE — 77063 BREAST TOMOSYNTHESIS BI: CPT

## 2025-02-27 PROCEDURE — 77063 BREAST TOMOSYNTHESIS BI: CPT | Performed by: RADIOLOGY

## 2025-02-27 PROCEDURE — 73200 CT UPPER EXTREMITY W/O DYE: CPT | Mod: RT

## 2025-02-27 PROCEDURE — 71271 CT THORAX LUNG CANCER SCR C-: CPT

## 2025-07-11 DIAGNOSIS — F32.A DEPRESSIVE DISORDER: Primary | ICD-10-CM

## 2025-07-11 RX ORDER — SERTRALINE HYDROCHLORIDE 100 MG/1
200 TABLET, FILM COATED ORAL DAILY
Qty: 90 TABLET | Refills: 0 | Status: SHIPPED | OUTPATIENT
Start: 2025-07-11

## 2025-07-28 ENCOUNTER — TELEPHONE (OUTPATIENT)
Dept: PRIMARY CARE | Facility: CLINIC | Age: 65
End: 2025-07-28
Payer: MEDICARE

## 2025-07-29 ENCOUNTER — LAB (OUTPATIENT)
Facility: HOSPITAL | Age: 65
End: 2025-07-29
Payer: MEDICARE

## 2025-07-29 ENCOUNTER — PRE-ADMISSION TESTING (OUTPATIENT)
Dept: PREADMISSION TESTING | Facility: HOSPITAL | Age: 65
DRG: 483 | End: 2025-07-29
Payer: MEDICARE

## 2025-07-29 VITALS
DIASTOLIC BLOOD PRESSURE: 82 MMHG | HEART RATE: 66 BPM | SYSTOLIC BLOOD PRESSURE: 131 MMHG | HEIGHT: 65 IN | BODY MASS INDEX: 35.59 KG/M2 | WEIGHT: 213.63 LBS | OXYGEN SATURATION: 100 % | RESPIRATION RATE: 18 BRPM | TEMPERATURE: 97.2 F

## 2025-07-29 DIAGNOSIS — R79.89 OTHER SPECIFIED ABNORMAL FINDINGS OF BLOOD CHEMISTRY: ICD-10-CM

## 2025-07-29 DIAGNOSIS — Z01.818 PRE-OP EXAMINATION: Primary | ICD-10-CM

## 2025-07-29 DIAGNOSIS — R94.6 ABNORMAL RESULTS OF THYROID FUNCTION STUDIES: ICD-10-CM

## 2025-07-29 LAB
25(OH)D3 SERPL-MCNC: 54 NG/ML (ref 30–100)
ALBUMIN SERPL BCP-MCNC: 4.5 G/DL (ref 3.4–5)
ALP SERPL-CCNC: 129 U/L (ref 33–136)
ALT SERPL W P-5'-P-CCNC: 14 U/L (ref 7–45)
ANION GAP SERPL CALCULATED.3IONS-SCNC: 12 MMOL/L (ref 10–20)
AST SERPL W P-5'-P-CCNC: 20 U/L (ref 9–39)
ATRIAL RATE: 61 BPM
BASOPHILS # BLD AUTO: 0.06 X10*3/UL (ref 0–0.1)
BASOPHILS NFR BLD AUTO: 0.7 %
BILIRUB SERPL-MCNC: 0.6 MG/DL (ref 0–1.2)
BUN SERPL-MCNC: 26 MG/DL (ref 6–23)
CALCIUM SERPL-MCNC: 9.6 MG/DL (ref 8.6–10.3)
CHLORIDE SERPL-SCNC: 102 MMOL/L (ref 98–107)
CHOLEST SERPL-MCNC: 225 MG/DL (ref 0–199)
CHOLEST/HDLC SERPL: 4.9 {RATIO}
CO2 SERPL-SCNC: 30 MMOL/L (ref 21–32)
CREAT SERPL-MCNC: 0.8 MG/DL (ref 0.5–1.05)
EGFRCR SERPLBLD CKD-EPI 2021: 81 ML/MIN/1.73M*2
EOSINOPHIL # BLD AUTO: 0.26 X10*3/UL (ref 0–0.7)
EOSINOPHIL NFR BLD AUTO: 3.1 %
ERYTHROCYTE [DISTWIDTH] IN BLOOD BY AUTOMATED COUNT: 14.9 % (ref 11.5–14.5)
EST. AVERAGE GLUCOSE BLD GHB EST-MCNC: 105 MG/DL
GLUCOSE SERPL-MCNC: 112 MG/DL (ref 74–99)
HBA1C MFR BLD: 5.3 % (ref ?–5.7)
HCT VFR BLD AUTO: 40.9 % (ref 36–46)
HDLC SERPL-MCNC: 45.7 MG/DL
HGB BLD-MCNC: 13.3 G/DL (ref 12–16)
IMM GRANULOCYTES # BLD AUTO: 0.04 X10*3/UL (ref 0–0.7)
IMM GRANULOCYTES NFR BLD AUTO: 0.5 % (ref 0–0.9)
LDLC SERPL CALC-MCNC: 109 MG/DL
LYMPHOCYTES # BLD AUTO: 1.76 X10*3/UL (ref 1.2–4.8)
LYMPHOCYTES NFR BLD AUTO: 21.1 %
MCH RBC QN AUTO: 29.6 PG (ref 26–34)
MCHC RBC AUTO-ENTMCNC: 32.5 G/DL (ref 32–36)
MCV RBC AUTO: 91 FL (ref 80–100)
MONOCYTES # BLD AUTO: 0.63 X10*3/UL (ref 0.1–1)
MONOCYTES NFR BLD AUTO: 7.5 %
NEUTROPHILS # BLD AUTO: 5.6 X10*3/UL (ref 1.2–7.7)
NEUTROPHILS NFR BLD AUTO: 67.1 %
NON HDL CHOLESTEROL: 179 MG/DL (ref 0–149)
NRBC BLD-RTO: 0 /100 WBCS (ref 0–0)
P AXIS: 54 DEGREES
P OFFSET: 186 MS
P ONSET: 123 MS
PLATELET # BLD AUTO: 260 X10*3/UL (ref 150–450)
POTASSIUM SERPL-SCNC: 4.7 MMOL/L (ref 3.5–5.3)
PR INTERVAL: 206 MS
PROT SERPL-MCNC: 7.4 G/DL (ref 6.4–8.2)
Q ONSET: 226 MS
QRS COUNT: 10 BEATS
QRS DURATION: 72 MS
QT INTERVAL: 436 MS
QTC CALCULATION(BAZETT): 438 MS
QTC FREDERICIA: 438 MS
R AXIS: -17 DEGREES
RBC # BLD AUTO: 4.49 X10*6/UL (ref 4–5.2)
SODIUM SERPL-SCNC: 139 MMOL/L (ref 136–145)
T AXIS: 53 DEGREES
T OFFSET: 444 MS
TRIGL SERPL-MCNC: 350 MG/DL (ref 0–149)
TSH SERPL-ACNC: 0.44 MIU/L (ref 0.44–3.98)
VENTRICULAR RATE: 61 BPM
VLDL: 70 MG/DL (ref 0–40)
WBC # BLD AUTO: 8.4 X10*3/UL (ref 4.4–11.3)

## 2025-07-29 PROCEDURE — 85025 COMPLETE CBC W/AUTO DIFF WBC: CPT

## 2025-07-29 PROCEDURE — 99214 OFFICE O/P EST MOD 30 MIN: CPT

## 2025-07-29 PROCEDURE — 80053 COMPREHEN METABOLIC PANEL: CPT

## 2025-07-29 PROCEDURE — 82306 VITAMIN D 25 HYDROXY: CPT | Mod: WESLAB

## 2025-07-29 PROCEDURE — 87081 CULTURE SCREEN ONLY: CPT | Mod: WESLAB

## 2025-07-29 PROCEDURE — 84443 ASSAY THYROID STIM HORMONE: CPT

## 2025-07-29 PROCEDURE — 93010 ELECTROCARDIOGRAM REPORT: CPT | Performed by: INTERNAL MEDICINE

## 2025-07-29 PROCEDURE — 83036 HEMOGLOBIN GLYCOSYLATED A1C: CPT | Mod: WESLAB

## 2025-07-29 PROCEDURE — 93005 ELECTROCARDIOGRAM TRACING: CPT

## 2025-07-29 PROCEDURE — 80061 LIPID PANEL: CPT

## 2025-07-29 ASSESSMENT — ENCOUNTER SYMPTOMS
PSYCHIATRIC NEGATIVE: 1
GASTROINTESTINAL NEGATIVE: 1
EYES NEGATIVE: 1
CARDIOVASCULAR NEGATIVE: 1
HEMATOLOGIC/LYMPHATIC NEGATIVE: 1
NEUROLOGICAL NEGATIVE: 1
ENDOCRINE NEGATIVE: 1
CONSTITUTIONAL NEGATIVE: 1
ALLERGIC/IMMUNOLOGIC NEGATIVE: 1
RESPIRATORY NEGATIVE: 1

## 2025-07-29 ASSESSMENT — DUKE ACTIVITY SCORE INDEX (DASI)
CAN YOU HAVE SEXUAL RELATIONS: YES
CAN YOU WALK A BLOCK OR TWO ON LEVEL GROUND: NO
TOTAL_SCORE: 25.95
CAN YOU WALK INDOORS, SUCH AS AROUND YOUR HOUSE: YES
CAN YOU PARTICIPATE IN STRENOUS SPORTS LIKE SWIMMING, SINGLES TENNIS, FOOTBALL, BASKETBALL, OR SKIING: NO
CAN YOU DO YARD WORK LIKE RAKING LEAVES, WEEDING OR PUSHING A MOWER: YES
CAN YOU CLIMB A FLIGHT OF STAIRS OR WALK UP A HILL: YES
CAN YOU PARTICIPATE IN MODERATE RECREATIONAL ACTIVITIES LIKE GOLF, BOWLING, DANCING, DOUBLES TENNIS OR THROWING A BASEBALL OR FOOTBALL: NO
CAN YOU RUN A SHORT DISTANCE: NO
CAN YOU DO HEAVY WORK AROUND THE HOUSE LIKE SCRUBBING FLOORS OR LIFTING AND MOVING HEAVY FURNITURE: NO
DASI METS SCORE: 5.9
CAN YOU TAKE CARE OF YOURSELF (EAT, DRESS, BATHE, OR USE TOILET): YES
CAN YOU DO MODERATE WORK AROUND THE HOUSE LIKE VACUUMING, SWEEPING FLOORS OR CARRYING GROCERIES: YES
CAN YOU DO LIGHT WORK AROUND THE HOUSE LIKE DUSTING OR WASHING DISHES: YES

## 2025-07-29 ASSESSMENT — PAIN DESCRIPTION - DESCRIPTORS: DESCRIPTORS: ACHING;DISCOMFORT

## 2025-07-29 ASSESSMENT — PAIN SCALES - GENERAL: PAINLEVEL_OUTOF10: 3

## 2025-07-29 ASSESSMENT — PAIN - FUNCTIONAL ASSESSMENT: PAIN_FUNCTIONAL_ASSESSMENT: 0-10

## 2025-07-29 NOTE — PREPROCEDURE INSTRUCTIONS
Medication List            Accurate as of July 29, 2025  8:07 AM. Always use your most recent med list.                acetaminophen 500 mg tablet  Commonly known as: Tylenol  Medication Adjustments for Surgery: Take/Use as prescribed     Aleve 220 mg tablet  Generic drug: naproxen sodium  Additional Medication Adjustments for Surgery: Take last dose 7 days before surgery     atorvastatin 40 mg tablet  Commonly known as: Lipitor  Take 1 tablet (40 mg) by mouth once daily.  Medication Adjustments for Surgery: Take on the morning of surgery     buPROPion  mg 24 hr tablet  Commonly known as: Wellbutrin XL  Take 1 tablet (300 mg) by mouth once daily in the morning.  Medication Adjustments for Surgery: Take on the morning of surgery     cholecalciferol 25 mcg (1,000 units) capsule  Commonly known as: Vitamin D-3  Additional Medication Adjustments for Surgery: Take last dose 7 days before surgery     levothyroxine 175 mcg tablet  Commonly known as: Synthroid, Levoxyl  Take 1 tablet (175 mcg) by mouth once daily in the morning. Take before meals.  Medication Adjustments for Surgery: Take on the morning of surgery     losartan 100 mg tablet  Commonly known as: Cozaar  Take 1 tablet (100 mg) by mouth once daily. Last dose 5- 1100  Medication Adjustments for Surgery: Do Not take on the morning of surgery     multivitamin tablet  Additional Medication Adjustments for Surgery: Take last dose 7 days before surgery     propranolol  mg 24 hr capsule  Commonly known as: Inderal LA  Take 2 capsules (240 mg) by mouth once daily. Do not crush, chew, or split.  Medication Adjustments for Surgery: Take on the morning of surgery     sertraline 100 mg tablet  Commonly known as: Zoloft  Take 2 tablets (200 mg) by mouth once daily.  Medication Adjustments for Surgery: Take on the morning of surgery                     Preoperative Fasting Guidelines    Why must I stop eating and drinking near surgery time?  With  sedation, food or liquid in your stomach can enter your lungs causing serious complications  Increases nausea and vomiting    When do I need to stop eating and drinking before my surgery?  Do not eat any food after midnight the night before your surgery/procedure.  You may have up to 13.5 ounces of clear liquid until TWO hours before your instructed arrival time to the hospital.  This includes water, black tea/coffee, (no milk or cream) apple juice, and electrolyte drinks (Gatorade)  You may chew gum until TWO hours before your surgery/procedure    PAT DISCHARGE INSTRUCTIONS    The Same Day Surgery (SDS) Department of the hospital where your procedure will be performed will contact you after 2:00 PM the day before your surgery. If you are scheduled on a Monday, or a Tuesday following a Monday holiday, they will call on the last business day prior to your surgery.  Please check your voicemail for any missed messages.    [facility]  [address] [place] [address] [phone]  Second Floor      [address], [address] [phone]  Second Floor  *PLEASE CALL ABOVE NUMBER FOR ARRIVAL TIME       [facility]  [address]  [place], [place] [id number] [phone]    Please let your surgeon know if:      You develop any open sores, shingles, burning or painful urination as these may increase your risk of an infection.   You no longer wish to have the surgery.   Any other personal circumstances change that may lead to the need to cancel or defer this surgery-such as being sick or getting admitted to any hospital within one week of your planned procedure.    Your contact details change, such as a change of address or phone number.    Starting now:     Please DO NOT drink alcohol or smoke for 24 hours before surgery. It is well known that quitting smoking can make a huge difference to  your health and recovery from surgery. The longer you abstain from smoking, the better your chances of a healthy recovery. If you need help with quitting, call 1800-QUIT-NOW to be connected to a trained counselor who will discuss the best methods to help you quit.     Before your surgery:    Please stop all supplements 7 days prior to surgery. Or as directed by your surgeon.   Please stop taking NSAID pain medicine such as Advil and Motrin 7 days before surgery.    If you develop any fever, cough, cold, rashes, cuts, scratches, scrapes, urinary symptoms or infection anywhere on your body (including teeth and gums) prior to surgery, please call your surgeon’s office as soon as possible. This may require treatment to reduce the chance of cancellation on the day of surgery.    The day before your surgery:   DIET- Please follow the diet instructions at the top of your packet.   Get a good night’s rest.  Use the special soap for bathing if you have been instructed to use one.    Scheduled surgery times may change and you will be notified if this occurs - please check your personal voicemail for any updates.     On the morning of surgery:   Wear comfortable, loose fitting clothes which open in the front. Please do not wear moisturizers, creams, lotions, makeup or perfume.    Please bring with you to surgery:   Photo ID and insurance card   Current list of medicines and allergies   Pacemaker/ Defibrillator/Heart stent cards   CPAP machine and mask    Slings/ splints/ crutches   A copy of your complete advanced directive/DHPOA.    Please do NOT bring with you to surgery:   All jewelry and valuables should be left at home.   Prosthetic devices such as contact lenses, hearing aids, dentures, eyelash extensions, hairpins and body piercings must be removed prior to going in to the surgical suite.    After outpatient surgery:   A responsible adult MUST accompany you at the time of discharge and stay with you for 24 hours after  your surgery. You may NOT drive yourself home after surgery.    Do not drive, operate machinery, make critical decisions or do activities that require co-ordination or balance until after a night’s sleep.   Do not drink alcoholic beverages for 24 hours.   Instructions for resuming your medications will be provided by your surgeon.    CALL YOUR DOCTOR AFTER SURGERY IF YOU HAVE:     Chills and/or a fever of 101° F or higher.    Redness, swelling, pus or drainage from your surgical wound or a bad smell from the wound.    Lightheadedness, fainting or confusion.    Persistent vomiting (throwing up) and are not able to eat or drink for 12 hours.    Three or more loose, watery bowel movements in 24 hours (diarrhea).   Difficulty or pain while urinating( after non-urological surgery)    Pain and swelling in your legs, especially if it is only on one side.    Difficulty breathing or are breathing faster than normal.    Any new concerning symptoms.      Patient Information: Pre-Operative Infection Prevention Measures     Why did I have my nose, under my arms, and groin swabbed?  The purpose of the swab is to identify Staphylococcus aureus inside your nose or on your skin.  The swab was sent to the laboratory for culture.  A positive swab/culture for Staphylococcus aureus is called colonization or carriage.      What is Staphylococcus aureus?  Staphylococcus aureus, also known as “staph”, is a germ found on the skin or in the nose of healthy people.  Sometimes Staphylococcus aureus can get into the body and cause an infection.  This can be minor (such as pimples, boils, or other skin problems).  It might also be serious (such as a blood infection, pneumonia, or a surgical site infection).    What is Staphylococcus aureus colonization or carriage?  Colonization or carriage means that a person has the germ but is not sick from it.  These bacteria can be spread on the hands or when breathing or sneezing.    How is Staphylococcus  aureus spread?  It is most often spread by close contact with a person or item that carries it.    What happens if my culture is positive for Staphylococcus aureus?  Your doctor/medical team will use this information to guide any antibiotic treatment which may be necessary.  Regardless of the culture results, we will clean the inside of your nose with a betadine swab just before you have your surgery.      Will I get an infection if I have Staphylococcus aureus in my nose or on my skin?  Anyone can get an infection with Staphylococcus aureus.  However, the best way to reduce your risk of infection is to follow the instructions provided to you for the use of your CHG soap and dental rinse.        Patient Information: Oral/Dental Rinse    What is oral/dental rinse?   It is a mouthwash. It is a way of cleaning the mouth with a germ-killing solution before your surgery.  The solution contains chlorhexidine, commonly known as CHG.   It is used inside the mouth to kill a bacteria known as Staphylococcus aureus.  Let your doctor know if you are allergic to Chlorhexidine.    Why do I need to use CHG oral/dental rinse?  The CHG oral/dental rinse helps to kill a bacteria in your mouth known as Staphylococcus aureus.     This reduces the risk of infection at the surgical site.      Using your CHG oral/dental rinse  STEPS:  Use your CHG oral/dental rinse after you brush your teeth the night before (at bedtime) and the morning of your surgery.  Follow all directions on your prescription label.    Use the cap on the container to measure 15ml   Swish (gargle if you can) the mouthwash in your mouth for at least 30 seconds, (do not swallow) and spit out  After you use your CHG rinse, do not rinse your mouth with water, drink or eat.  Please refer to the prescription label for the appropriate time to resume oral intake      What side effects might I have using the CHG oral/dental rinse?  CHG rinse will stick to plaque on the teeth.   Brush and floss just before use.  Teeth brushing will help avoid staining of plaque during use.      Patient Information: Home Preoperative Antibacterial Shower      What is a home preoperative antibacterial shower?  This shower is a way of cleaning the skin with a germ-killing solution before surgery.  The solution contains chlorhexidine, commonly known as CHG.  CHG is a skin cleanser with germ-killing ability.  Let your doctor know if you are allergic to chlorhexidine.    Why do I need to take a preoperative antibacterial shower?  Skin is not sterile.  It is best to try to make your skin as free of germs as possible before surgery.  Proper cleansing with a germ-killing soap before surgery can lower the number of germs on your skin.  This helps to reduce the risk of infection at the surgical site.  Following the instructions listed below will help you prepare your skin for surgery.      How do I use the solution?  Steps:  Begin using your CHG soap 7/29, 7/30, 7/31 and the morning of your scheduled surgery on ___8/1/25_.    First, wash and rinse your hair using the CHG soap. Keep CHG soap away from ear canals and eyes.  Rinse completely, do not condition.  Hair extensions should be removed.  Wash your face with your normal soap and rinse.    Apply the CHG solution to a clean wet washcloth.  Turn the water off or move away from the water spray to avoid premature rinsing of the CHG soap as you are applying.   Firmly lather your entire body from the neck down.  Do not use on your face.  Pay special attention to the area(s) where your incision(s) will be located unless they are on your face.  Avoid scrubbing your skin too hard.  The important point is to have the CHG soap sit on your skin for 3 minutes.    When the 3 minutes are up, turn on the water and rinse the CHG solution off your body completely.   DO NOT wash with regular soap after you have used the CHG soap solution  Pat yourself dry with a clean,  freshly-laundered towel.  DO NOT apply powders, deodorants, or lotions.  Dress in clean, freshly laundered nightclothes.    Be sure to sleep with clean, freshly laundered sheets.  Be aware that CHG will cause stains on fabrics; if you wash them with bleach after use.  Rinse your washcloth and other linens that have contact with CHG completely.  Use only non-chlorine detergents to launder the items used.   The morning of surgery is the fifth day.  Repeat the above steps and dress in clean comfortable clothing     Whom should I contact if I have any questions regarding the use of CHG soap?  Call the [facility] at [phone] if you have any questions.

## 2025-07-29 NOTE — CPM/PAT H&P
CPM/PAT Evaluation       Name: Monse Aguilera (Monse Aguilera)  /Age: 1960/65 y.o.     In-Person       Chief Complaint: Right shoulder pain    HPI: Monse Aguilera is a 65 year old female who has complaints of right shoulder pain. She had a total right shoulder in . She states the right  shoulder began to give her pain in 2024 . She denies injury. She states with ROM the shoulder does not glide smoothly. She states she has numbness and tingling in her hand. She takes Aleve, Tylenol She is scheduled for a total right shoulder revision arthroplasty. She denies fever, chills, nausea, vomiting, chest pain, SOB, dizziness,and palpitations    Medical History[1]    Surgical History[2]    Social History     Tobacco Use    Smoking status: Some Days     Current packs/day: 1.00     Average packs/day: 1 pack/day for 52.6 years (52.6 ttl pk-yrs)     Types: Cigarettes     Start date:      Passive exposure: Current    Smokeless tobacco: Current    Tobacco comments:     Pt states she is a weekend smoker   Substance Use Topics    Alcohol use: Yes     Alcohol/week: 3.0 standard drinks of alcohol     Types: 3 Standard drinks or equivalent per week     Comment: 3-4 drinks per week     Social History     Substance and Sexual Activity   Drug Use Never         Family History[3]    Allergies[4]  Current Outpatient Medications   Medication Sig Dispense Refill    acetaminophen (Tylenol) 500 mg tablet Take 2 tablets (1,000 mg) by mouth every 8 hours if needed for mild pain (1 - 3).      atorvastatin (Lipitor) 40 mg tablet Take 1 tablet (40 mg) by mouth once daily. 90 tablet 1    buPROPion XL (Wellbutrin XL) 300 mg 24 hr tablet Take 1 tablet (300 mg) by mouth once daily in the morning. 90 tablet 1    cholecalciferol (Vitamin D-3) 25 MCG (1000 UT) capsule Take 1 capsule (25 mcg) by mouth once daily.      levothyroxine (Synthroid, Levoxyl) 175 mcg tablet Take 1 tablet (175 mcg) by mouth once daily in the morning. Take  before meals. 90 tablet 1    losartan (Cozaar) 100 mg tablet Take 1 tablet (100 mg) by mouth once daily. Last dose 5- 1100 90 tablet 1    multivitamin tablet Take 1 tablet by mouth once daily.      naproxen sodium (Aleve) 220 mg tablet Take 2 tablets (440 mg) by mouth 2 times daily (morning and late afternoon).      propranolol LA (Inderal LA) 120 mg 24 hr capsule Take 2 capsules (240 mg) by mouth once daily. Do not crush, chew, or split. 180 capsule 1    sertraline (Zoloft) 100 mg tablet Take 2 tablets (200 mg) by mouth once daily. 90 tablet 0     No current facility-administered medications for this visit.        Review of Systems   Constitutional: Negative.    HENT: Negative.     Eyes: Negative.    Respiratory: Negative.     Cardiovascular: Negative.    Gastrointestinal: Negative.    Endocrine: Negative.    Genitourinary: Negative.    Musculoskeletal:         Right shoulder pain     Skin: Negative.    Allergic/Immunologic: Negative.    Neurological: Negative.    Hematological: Negative.    Psychiatric/Behavioral: Negative.               Physical Exam  Vitals reviewed.   Constitutional:       Appearance: Normal appearance.   HENT:      Head: Normocephalic and atraumatic.      Nose: Nose normal.      Mouth/Throat:      Mouth: Mucous membranes are moist.      Pharynx: Oropharynx is clear.     Eyes:      Extraocular Movements: Extraocular movements intact.      Conjunctiva/sclera: Conjunctivae normal.       Cardiovascular:      Rate and Rhythm: Normal rate and regular rhythm.      Pulses: Normal pulses.      Heart sounds: Normal heart sounds.   Pulmonary:      Effort: Pulmonary effort is normal.      Breath sounds: Normal breath sounds.   Abdominal:      Palpations: Abdomen is soft.   Genitourinary:     Comments: Assessment deferred to physician    Musculoskeletal:      Cervical back: Normal range of motion and neck supple.      Comments: Limited ROM in right shoulder. Patient uses a cane to ambulate  "    Skin:     General: Skin is warm and dry.     Neurological:      General: No focal deficit present.      Mental Status: She is alert and oriented to person, place, and time.     Psychiatric:         Mood and Affect: Mood normal.         Behavior: Behavior normal.         Thought Content: Thought content normal.         Judgment: Judgment normal.          [facility] AIRWAY:   Airway:     Mallampati::  III    TM distance::  >3 FB    Neck ROM::  Full  normal          Visit Vitals  /82   Pulse 66   Temp 36.2 °C (97.2 °F) (Temporal)   Resp 18   Ht 1.651 m (5' 5\")   Wt 96.9 kg (213 lb 10 oz)   SpO2 100%   BMI 35.55 kg/m²   OB Status Postmenopausal   Smoking Status Some Days   BSA 2.11 m²   ASA: III  CHADS2: 2.8%  RCRI: 0.9%  DASI:25.95  METS:5.9  STOP BAN        Assessment and Plan:     Pain in prosthetic joint, initial encounter : Revision Total Shoulder Arthroplasty   Catecholaminergic polymorphic ventricular tachycardia: ICD placed in  with generator replacement in . Pt reports the last time the battery was checked was in  and per pt the report showed 5 years of battery life remaining. Per pt the ICD has never went off. Pt used to see Dr Finnegan. Pt reports when the ICD device battery runs out she does not wish to have a new generator placed. Pt reports the device was placed d/t one of her children being diagnosed with the problem and finding out that the genetic condition was passed from her to her children.   Hypertension: Losartan  Hyperlipidemia: Atorvastatin  Hypothyroid: Levothyroxine  Migraines: Propranolol  Anxiety: Wellbutrin, Zoloft  Difficult airway  BMI: 35.55    LABS: CBC, BMP, Hemoglobin A1C, TSH  EKG done in [facility]  Patient is requesting to have Dr. Mckeon anesthesia who approved her surgery last time without cardiac clearance or a ICD check. I spoke with Dr. Hamilton who states she will need clearance.  Patient was told she will need cardiac clearance for this procedure. Jona's " office is aware.  Secure Chat  Leida CHAVIRA-CNP: Hello I just wanted to make you aware of this patient who is scheduled for a right shoulder revision. She has a history of Catecholaminergic polymorphic ventricular tachycardia. She had an ICD placed in 2005 with generator replacement in 2015. Pt reports the last time the battery was checked was in 2020 and per pt the report showed 5 years of battery life remaining. Per pt the ICD has never went off. Pt used to see Dr Finnegan. Pt reports when the ICD device battery runs out she does not wish to have a new generator placed. Pt reports the device was placed d/t one of her children being diagnosed with the problem and finding out that the genetic condition was passed from her to her children. She did have her hip replaced 5/20/24 without a ICD check done or cardiac clearance. Are you ok with this?    4 mins  PADDY Hamilton MD MPH  No. She needs cardiac clearance.    1 min  Leida CHAVIRA-CNP: Thank you      LORI Koch  Addendum  Cardiac Clearance Dr. Aragon 7/30/25  65-year patient with no prior cardiac history except a history of polymorphic VT, mixed hyperlipidemia as well as long QT syndrome.  Upcoming right shoulder surgery.  No chest pain or tightness currently well-managed with the beta-blocker.  1.  Benign hypertension: Continue current losartan milligram tablet p.o. daily.  2.  History of polymorphic VT/ICD: Continue current propranolol 24 mg capsule p.o. daily.  3.  Mixed hyperlipidemia: Continue current atorvastatin 40 mg daily.  Patient also on Tricor for hypertriglyceridemia.  4.  Preop clearance: Patient stable cardiac wise, continue beta-blocker or rate control medication.  Moderate risk for CV events during the surgery.  No contraindication for surgery.  Hold blood thinner 48 hours before surgery.    LORI Koch         [1]   Past Medical History:  Diagnosis Date    AICD (automatic cardioverter/defibrillator)  present     CPVT    Allergic     Anxiety     related to loss of son    Arrhythmia     CPVT.   Found on genetic testing after 14y/o  Son’s death; unwitnessed cardiac arrest.  **ICD implanted.    Arthritis     Catecholaminergic polymorphic ventricular tachycardia     Chronic pain disorder 2004    Lumbar Stenosis    Depression     loss of 18 y/o son d/t unwitnessed, cardiac arrest    Disease of thyroid gland     Hard to intubate     Heart disease     History of transfusion     Hyperlipidemia ???    Hypertension 2019    Hypothyroidism ????  10+ years    Joint pain     Degenerative Arthritis    Migraine     Nephrolithiasis 2004    Noted on Xray for spinal stenosis    Spinal stenosis 2004    ongoing pain   [2]   Past Surgical History:  Procedure Laterality Date    BACK SURGERY      BUNIONECTOMY      CARDIAC DEFIBRILLATOR PLACEMENT Left 2005     SECTION, LOW TRANSVERSE  1991    [facility]    ENDOMETRIAL ABLATION  ???     FL GUIDED CONTRAST INJECTION HIP RIGHT Right 2023    FL GUIDED INJECTION HIP RIGHT 2023 STEFFANIE DIAGRAD    FL GUIDED CONTRAST INJECTION HIP RIGHT  2024    INSERT / REPLACE / REMOVE PACEMAKER  2005    ICD placement,  Not Pacemaker    JOINT REPLACEMENT  ,  &     Bilat Knees/ Right Shoulder    LUMBAR FUSION   and     L1-L5    LUMBAR LAMINECTOMY  2006    OOPHORECTOMY  1989    OTHER SURGICAL HISTORY  2015    Generator change in ICD    SPINAL FUSION  2014    L1-L5 rods/screws    TOTAL KNEE ARTHROPLASTY Bilateral     TOTAL SHOULDER ARTHROPLASTY Right 10/2023    TUBAL LIGATION     [3]   Family History  Problem Relation Name Age of Onset    Other (elevated lipids) Mother Mom     Rheum arthritis Mother Mom     Diabetes Mother Mom     Hypertension Mother Mom     Other (Elevated lipids) Father Dad     Coronary artery disease Father Dad     Kidney failure Father Dad     Hypertension Father Dad     Diabetes Father Dad     Heart disease Father Dad      Arthritis Father Dad     Hyperlipidemia Father Dad     Kidney disease Father Dad     Other (thyroid problems) Sister      Other (Cardiac arrest) Son  12    Hyperlipidemia Sibling      Other (Cardiac arrest) Daughter Amy 15   [4]   Allergies  Allergen Reactions    Sulfa (Sulfonamide Antibiotics) Rash

## 2025-07-30 ENCOUNTER — OFFICE VISIT (OUTPATIENT)
Dept: PRIMARY CARE | Facility: CLINIC | Age: 65
End: 2025-07-30
Payer: MEDICARE

## 2025-07-30 ENCOUNTER — OFFICE VISIT (OUTPATIENT)
Dept: CARDIOLOGY | Facility: CLINIC | Age: 65
End: 2025-07-30
Payer: MEDICARE

## 2025-07-30 ENCOUNTER — RESULTS FOLLOW-UP (OUTPATIENT)
Dept: PRIMARY CARE | Facility: CLINIC | Age: 65
End: 2025-07-30

## 2025-07-30 VITALS
WEIGHT: 215.4 LBS | HEIGHT: 65 IN | RESPIRATION RATE: 16 BRPM | OXYGEN SATURATION: 98 % | HEART RATE: 77 BPM | SYSTOLIC BLOOD PRESSURE: 124 MMHG | DIASTOLIC BLOOD PRESSURE: 80 MMHG | BODY MASS INDEX: 35.89 KG/M2

## 2025-07-30 VITALS
SYSTOLIC BLOOD PRESSURE: 120 MMHG | RESPIRATION RATE: 16 BRPM | HEART RATE: 72 BPM | DIASTOLIC BLOOD PRESSURE: 74 MMHG | WEIGHT: 215 LBS | OXYGEN SATURATION: 94 % | BODY MASS INDEX: 35.78 KG/M2

## 2025-07-30 DIAGNOSIS — I10 ESSENTIAL HYPERTENSION: Primary | ICD-10-CM

## 2025-07-30 DIAGNOSIS — Z78.0 ASYMPTOMATIC MENOPAUSAL STATE: ICD-10-CM

## 2025-07-30 DIAGNOSIS — I45.81 LONG QT SYNDROME: ICD-10-CM

## 2025-07-30 DIAGNOSIS — G43.E19 INTRACTABLE CHRONIC MIGRAINE WITH AURA AND WITHOUT STATUS MIGRAINOSUS: ICD-10-CM

## 2025-07-30 DIAGNOSIS — Z95.810 ICD (IMPLANTABLE CARDIOVERTER-DEFIBRILLATOR) IN PLACE: ICD-10-CM

## 2025-07-30 DIAGNOSIS — E03.9 ACQUIRED HYPOTHYROIDISM: ICD-10-CM

## 2025-07-30 DIAGNOSIS — Z01.810 ENCOUNTER FOR PRE-OPERATIVE CARDIOVASCULAR CLEARANCE: ICD-10-CM

## 2025-07-30 DIAGNOSIS — Z87.891 PERSONAL HISTORY OF NICOTINE DEPENDENCE: ICD-10-CM

## 2025-07-30 DIAGNOSIS — E78.2 MIXED HYPERLIPIDEMIA: ICD-10-CM

## 2025-07-30 DIAGNOSIS — I47.29 CPVT (CATECHOLAMINERGIC POLYMORPHIC VENTRICULAR TACHYCARDIA): ICD-10-CM

## 2025-07-30 LAB — STAPHYLOCOCCUS SPEC CULT: NORMAL

## 2025-07-30 PROCEDURE — 1160F RVW MEDS BY RX/DR IN RCRD: CPT | Performed by: INTERNAL MEDICINE

## 2025-07-30 PROCEDURE — 99214 OFFICE O/P EST MOD 30 MIN: CPT

## 2025-07-30 PROCEDURE — 3078F DIAST BP <80 MM HG: CPT | Performed by: INTERNAL MEDICINE

## 2025-07-30 PROCEDURE — 3074F SYST BP LT 130 MM HG: CPT

## 2025-07-30 PROCEDURE — 99204 OFFICE O/P NEW MOD 45 MIN: CPT | Performed by: INTERNAL MEDICINE

## 2025-07-30 PROCEDURE — 1159F MED LIST DOCD IN RCRD: CPT | Performed by: INTERNAL MEDICINE

## 2025-07-30 PROCEDURE — 3079F DIAST BP 80-89 MM HG: CPT

## 2025-07-30 PROCEDURE — 1125F AMNT PAIN NOTED PAIN PRSNT: CPT

## 2025-07-30 PROCEDURE — 3008F BODY MASS INDEX DOCD: CPT

## 2025-07-30 PROCEDURE — 1159F MED LIST DOCD IN RCRD: CPT

## 2025-07-30 PROCEDURE — 3074F SYST BP LT 130 MM HG: CPT | Performed by: INTERNAL MEDICINE

## 2025-07-30 PROCEDURE — 1126F AMNT PAIN NOTED NONE PRSNT: CPT | Performed by: INTERNAL MEDICINE

## 2025-07-30 PROCEDURE — G2211 COMPLEX E/M VISIT ADD ON: HCPCS | Performed by: INTERNAL MEDICINE

## 2025-07-30 RX ORDER — FENOFIBRATE 54 MG/1
54 TABLET ORAL DAILY
Qty: 90 TABLET | Refills: 1 | Status: SHIPPED | OUTPATIENT
Start: 2025-07-30 | End: 2026-01-26

## 2025-07-30 RX ORDER — PROPRANOLOL HYDROCHLORIDE 120 MG/1
240 CAPSULE, EXTENDED RELEASE ORAL DAILY
Qty: 180 CAPSULE | Refills: 1 | Status: SHIPPED | OUTPATIENT
Start: 2025-07-30 | End: 2026-01-26

## 2025-07-30 ASSESSMENT — LIFESTYLE VARIABLES
AUDIT TOTAL SCORE: 2
HOW OFTEN DO YOU HAVE SIX OR MORE DRINKS ON ONE OCCASION: NEVER
AUDIT-C TOTAL SCORE: 2
HAVE YOU OR SOMEONE ELSE BEEN INJURED AS A RESULT OF YOUR DRINKING: NO
HOW MANY STANDARD DRINKS CONTAINING ALCOHOL DO YOU HAVE ON A TYPICAL DAY: 1 OR 2
HOW OFTEN DO YOU HAVE A DRINK CONTAINING ALCOHOL: 2-4 TIMES A MONTH
HAS A RELATIVE, FRIEND, DOCTOR, OR ANOTHER HEALTH PROFESSIONAL EXPRESSED CONCERN ABOUT YOUR DRINKING OR SUGGESTED YOU CUT DOWN: NO
SKIP TO QUESTIONS 9-10: 1

## 2025-07-30 ASSESSMENT — PATIENT HEALTH QUESTIONNAIRE - PHQ9
2. FEELING DOWN, DEPRESSED OR HOPELESS: NOT AT ALL
6. FEELING BAD ABOUT YOURSELF - OR THAT YOU ARE A FAILURE OR HAVE LET YOURSELF OR YOUR FAMILY DOWN: NEARLY EVERY DAY
9. THOUGHTS THAT YOU WOULD BE BETTER OFF DEAD, OR OF HURTING YOURSELF: NOT AT ALL
9. THOUGHTS THAT YOU WOULD BE BETTER OFF DEAD, OR OF HURTING YOURSELF: NOT AT ALL
7. TROUBLE CONCENTRATING ON THINGS, SUCH AS READING THE NEWSPAPER OR WATCHING TELEVISION: NEARLY EVERY DAY
5. POOR APPETITE OR OVEREATING: NEARLY EVERY DAY
2. FEELING DOWN, DEPRESSED OR HOPELESS: NEARLY EVERY DAY
SUM OF ALL RESPONSES TO PHQ9 QUESTIONS 1 AND 2: 0
8. MOVING OR SPEAKING SO SLOWLY THAT OTHER PEOPLE COULD HAVE NOTICED. OR THE OPPOSITE, BEING SO FIGETY OR RESTLESS THAT YOU HAVE BEEN MOVING AROUND A LOT MORE THAN USUAL: NOT AT ALL
8. MOVING OR SPEAKING SO SLOWLY THAT OTHER PEOPLE COULD HAVE NOTICED. OR THE OPPOSITE, BEING SO FIGETY OR RESTLESS THAT YOU HAVE BEEN MOVING AROUND A LOT MORE THAN USUAL: NOT AT ALL
1. LITTLE INTEREST OR PLEASURE IN DOING THINGS: NEARLY EVERY DAY
1. LITTLE INTEREST OR PLEASURE IN DOING THINGS: NOT AT ALL
SUM OF ALL RESPONSES TO PHQ QUESTIONS 1-9: 21
2. FEELING DOWN, DEPRESSED OR HOPELESS: NOT AT ALL
3. TROUBLE FALLING OR STAYING ASLEEP OR SLEEPING TOO MUCH: NEARLY EVERY DAY
4. FEELING TIRED OR HAVING LITTLE ENERGY: NEARLY EVERY DAY
1. LITTLE INTEREST OR PLEASURE IN DOING THINGS: NOT AT ALL
SUM OF ALL RESPONSES TO PHQ9 QUESTIONS 1 AND 2: 6
SUM OF ALL RESPONSES TO PHQ9 QUESTIONS 1 AND 2: 0

## 2025-07-30 ASSESSMENT — ENCOUNTER SYMPTOMS
LOSS OF SENSATION IN FEET: 0
DEPRESSION: 0
LOSS OF SENSATION IN FEET: 0
DEPRESSION: 0
OCCASIONAL FEELINGS OF UNSTEADINESS: 0
OCCASIONAL FEELINGS OF UNSTEADINESS: 0

## 2025-07-30 ASSESSMENT — COLUMBIA-SUICIDE SEVERITY RATING SCALE - C-SSRS
2. HAVE YOU ACTUALLY HAD ANY THOUGHTS OF KILLING YOURSELF?: NO
1. IN THE PAST MONTH, HAVE YOU WISHED YOU WERE DEAD OR WISHED YOU COULD GO TO SLEEP AND NOT WAKE UP?: NO
6. HAVE YOU EVER DONE ANYTHING, STARTED TO DO ANYTHING, OR PREPARED TO DO ANYTHING TO END YOUR LIFE?: NO

## 2025-07-30 ASSESSMENT — PAIN SCALES - GENERAL
PAINLEVEL_OUTOF10: 0-NO PAIN
PAINLEVEL_OUTOF10: 6

## 2025-07-30 NOTE — PATIENT INSTRUCTIONS
Assessment/Plan   Assessment & Plan  Essential hypertension    HYPERTENSION:   Decrease intake of processed foods, fast foods, lunch meat, canned soups, canned veggies.  Increase intake of fresh fruits, veggies, and lean meats. Monitor blood pressure at home, keep a log and bring this with you to your next appointment. Call the office if your blood pressure runs 150/90 or higher consistently.  Blood Pressure Technique:  Sit quietly in a chair for 5 minutes with back supported and feet on the floor  Then place left arm on a table or armrest so bicep area is at the same level of heart or left breast  Check three times in a row, disregard the highest reading and average the other two    Mixed hyperlipidemia      Decrease intake of saturated fats, fast food, sweets.  Increase intake of fresh fruit fresh vegetables and lean meats.  Increase healthy fats seeds, nuts, olive oil instead of butter.  walk 150 minutes/week for heart health.   Aim for 25-30 grams of fiber in your diet daily.  May consider adding Fish Oil supplement 1,200 mg per day or Omega 3 Supplement daily.      LAB Order/ BLOOD TESTS   I have ordered lab work for you to get done. This should be fasting. Nothing to eat or drink after midnight besides black tea,  black coffee, or water. If you do not hear from this office within two days of having your labs done, please call for your results.   Please call to schedule an appointment:   [facility] Scheduling phone number is [phone]  You can also schedule an appointment online by logging into   [employer]   Personal history of nicotine dependence    Orders:    XR DEXA bone density; Future  Osteoporosis Screening  I have ordered a bone density test to be done when you can. We will call the results.  Please stop medications that contain calcium or calcium supplement Including calcium pills, vitamin D, antacid medications, and multivitamins 24 hours before your test.   If it has been less than 23 months  since your last bone density scan, please check with your insurance provider prior to exam to ensure coverage.   Insulin Devices cannot be worn during a CT, XRAY, or MRI exam- exposure could cause the device to malfunction. Please schedule around the exchange of your device as it cannot be reattached once removed.   You can not have a bone denisty scan if a contrast study was completed in the last 7 days of the Bone density exam.     Asymptomatic menopausal state    Orders:    XR DEXA bone density; Future  Osteoporosis Screening  I have ordered a bone density test to be done when you can. We will call the results.  Please stop medications that contain calcium or calcium supplement Including calcium pills, vitamin D, antacid medications, and multivitamins 24 hours before your test.   If it has been less than 23 months since your last bone density scan, please check with your insurance provider prior to exam to ensure coverage.   Insulin Devices cannot be worn during a CT, XRAY, or MRI exam- exposure could cause the device to malfunction. Please schedule around the exchange of your device as it cannot be reattached once removed.   You can not have a bone denisty scan if a contrast study was completed in the last 7 days of the Bone density exam.     Intractable chronic migraine with aura and without status migrainosus    Orders:    propranolol LA (Inderal LA) 120 mg 24 hr capsule; Take 2 capsules (240 mg) by mouth once daily. Do not crush, chew, or split.        Vaccines are important! Please reconsider a flu shot and the Covid-19 vaccine  - Yearly seasonal flu shots are recommended, high dose is indicated for patient over 65.   - Tetanus boosters should be given every 10 yrs, this also covers for diphtheria and pertussis.   - most insurances cover the 2-shot series for shingles (shingrix) after the age of 50.   - Pneumonia vaccines are given routinely at age 65, however if you have a chronic heart or lung diagnosis  this may be given before age 65.     Preventative cancer screens SAVE LIVES!  - mammograms are recommend yearly starting at the age of 40 in women, sometimes sooner based on family history.   - Prostate cancer screening is included in blood work in men over 40 every 2-4 years, unless risk high  - Colon cancer screening is recommended at age 45 for all patients, sometimes sooner based on family history.   - Cervical cancer screening through the use of a PAP test, is done on all women aged 21-65.   - other tests may be recommended if you are a smoker!     150 minutes of aerobic exercise is advised for good cardiovascular health and to maintain a healthy weight.   Please try to work on maintaining a healthy body weight, with a BMI close to or at a BMI 19-25.    I recommend a whole-foods, plant-based diet, filled with fresh fruits, vegetables, seeds, beans, nuts and berries.    Discussed smoking cessation/Abstinence is best.     Abstaining from the use of alcohol is best. However if you choose to drink current guidelines are 2 or less drinks per day for men and 1.5 or less per day for women (and not all saved for one night on the weekend).

## 2025-07-30 NOTE — PROGRESS NOTES
"Subjective   Patient ID: Monse Aguilera is a 65 y.o. female who presents for 6 Month follow up .    HPI   Patient denies any falls, urgent care, ER, hospitalization, new diagnoses, surgeries since they were here last.      Denies any issues with chest pain, chest pressure, constipation, diarrhea, blood in stool, urinary urgency, frequency, blood in urine, muscle weakness in arms and legs, numbness or tingling in fingers or toes.    HLD  7/29/25 Recent labs  Cholesterol 225 previous (224)    previous (124)   Triglycerides 350  previous (227)   Compliant on atorvastatin 40mg once daily   Diet is not compliant   Only eating once per day,     Fasting glucose 112  All other labs stable/wnl     SOB with exertion resolves with rest     Right shoulder replacement on Friday- does not need surgical clearance by this provider.     Admits to increasing stress at home with her spouse. During that time she notes slight elevation in her blood pressure.   Review of Systems  Review of Systems negative except as noted in HPI and Chief complaint.  Current Medications[1]    Objective   /80 (BP Location: Left arm, Patient Position: Sitting, BP Cuff Size: Adult)   Pulse 77   Resp 16   Ht 1.651 m (5' 5\")   Wt 97.7 kg (215 lb 6.4 oz)   SpO2 98%   BMI 35.84 kg/m²     Physical Exam  Vitals reviewed.     Cardiovascular:      Rate and Rhythm: Normal rate.     Musculoskeletal:      Cervical back: Normal range of motion.     Neurological:      General: No focal deficit present.      Mental Status: She is alert.     Psychiatric:         Mood and Affect: Mood normal.         Assessment/Plan   Assessment & Plan  Essential hypertension    HYPERTENSION:   Decrease intake of processed foods, fast foods, lunch meat, canned soups, canned veggies.  Increase intake of fresh fruits, veggies, and lean meats. Monitor blood pressure at home, keep a log and bring this with you to your next appointment. Call the office if your blood pressure " runs 150/90 or higher consistently.  Blood Pressure Technique:  Sit quietly in a chair for 5 minutes with back supported and feet on the floor  Then place left arm on a table or armrest so bicep area is at the same level of heart or left breast  Check three times in a row, disregard the highest reading and average the other two    Mixed hyperlipidemia  Begin Tricor once daily. Repeat fasting lipid panel in 12 weeks     Decrease intake of saturated fats, fast food, sweets.  Increase intake of fresh fruit fresh vegetables and lean meats.  Increase healthy fats seeds, nuts, olive oil instead of butter.  walk 150 minutes/week for heart health.   Aim for 25-30 grams of fiber in your diet daily.  May consider adding Fish Oil supplement 1,200 mg per day or Omega 3 Supplement daily.      LAB Order/ BLOOD TESTS   I have ordered lab work for you to get done. This should be fasting. Nothing to eat or drink after midnight besides black tea,  black coffee, or water. If you do not hear from this office within two days of having your labs done, please call for your results.   Please call to schedule an appointment:   [facility] Scheduling phone number is [phone]  You can also schedule an appointment online by logging into   [employer]   Personal history of nicotine dependence    Orders:    XR DEXA bone density; Future  Osteoporosis Screening  I have ordered a bone density test to be done when you can. We will call the results.  Please stop medications that contain calcium or calcium supplement Including calcium pills, vitamin D, antacid medications, and multivitamins 24 hours before your test.   If it has been less than 23 months since your last bone density scan, please check with your insurance provider prior to exam to ensure coverage.   Insulin Devices cannot be worn during a CT, XRAY, or MRI exam- exposure could cause the device to malfunction. Please schedule around the exchange of your device as it cannot be  reattached once removed.   You can not have a bone denisty scan if a contrast study was completed in the last 7 days of the Bone density exam.     Asymptomatic menopausal state    Orders:    XR DEXA bone density; Future  Osteoporosis Screening  I have ordered a bone density test to be done when you can. We will call the results.  Please stop medications that contain calcium or calcium supplement Including calcium pills, vitamin D, antacid medications, and multivitamins 24 hours before your test.   If it has been less than 23 months since your last bone density scan, please check with your insurance provider prior to exam to ensure coverage.   Insulin Devices cannot be worn during a CT, XRAY, or MRI exam- exposure could cause the device to malfunction. Please schedule around the exchange of your device as it cannot be reattached once removed.   You can not have a bone denisty scan if a contrast study was completed in the last 7 days of the Bone density exam.     Intractable chronic migraine with aura and without status migrainosus    Orders:    propranolol LA (Inderal LA) 120 mg 24 hr capsule; Take 2 capsules (240 mg) by mouth once daily. Do not crush, chew, or split.    Acquired hypothyroidism    Orders:    Tsh With Reflex To Free T4 If Abnormal; Future  Begin synthroid Monday through Saturday- skipping Sunday dose.   Repeat TSH in 3 months       Vaccines are important! Please reconsider a flu shot and the Covid-19 vaccine  - Yearly seasonal flu shots are recommended, high dose is indicated for patient over 65.   - Tetanus boosters should be given every 10 yrs, this also covers for diphtheria and pertussis.   - most insurances cover the 2-shot series for shingles (shingrix) after the age of 50.   - Pneumonia vaccines are given routinely at age 65, however if you have a chronic heart or lung diagnosis this may be given before age 65.     Preventative cancer screens SAVE LIVES!  - mammograms are recommend yearly  starting at the age of 40 in women, sometimes sooner based on family history.   - Prostate cancer screening is included in blood work in men over 40 every 2-4 years, unless risk high  - Colon cancer screening is recommended at age 45 for all patients, sometimes sooner based on family history.   - Cervical cancer screening through the use of a PAP test, is done on all women aged 21-65.   - other tests may be recommended if you are a smoker!     150 minutes of aerobic exercise is advised for good cardiovascular health and to maintain a healthy weight.   Please try to work on maintaining a healthy body weight, with a BMI close to or at a BMI 19-25.    I recommend a whole-foods, plant-based diet, filled with fresh fruits, vegetables, seeds, beans, nuts and berries.    Discussed smoking cessation/Abstinence is best.     Abstaining from the use of alcohol is best. However if you choose to drink current guidelines are 2 or less drinks per day for men and 1.5 or less per day for women (and not all saved for one night on the weekend).    This note was dictated using [employer] speech recognition software and was corrected for spelling or grammatical errors, but despite proofreading several typographical errors might be present that might affect the meaning of the content.  Genet Gaffney, BRYAN             [1]   Current Outpatient Medications:     atorvastatin (Lipitor) 40 mg tablet, Take 1 tablet (40 mg) by mouth once daily., Disp: 90 tablet, Rfl: 1    buPROPion XL (Wellbutrin XL) 300 mg 24 hr tablet, Take 1 tablet (300 mg) by mouth once daily in the morning., Disp: 90 tablet, Rfl: 1    cholecalciferol (Vitamin D-3) 25 MCG (1000 UT) capsule, Take 1 capsule (25 mcg) by mouth once daily., Disp: , Rfl:     levothyroxine (Synthroid, Levoxyl) 175 mcg tablet, Take 1 tablet (175 mcg) by mouth once daily in the morning. Take before meals., Disp: 90 tablet, Rfl: 1    losartan (Cozaar) 100 mg tablet, Take 1 tablet (100 mg) by mouth once  daily. Last dose 5- 1100, Disp: 90 tablet, Rfl: 1    multivitamin tablet, Take 1 tablet by mouth once daily., Disp: , Rfl:     naproxen sodium (Aleve) 220 mg tablet, Take 2 tablets (440 mg) by mouth 2 times daily (morning and late afternoon)., Disp: , Rfl:     sertraline (Zoloft) 100 mg tablet, Take 2 tablets (200 mg) by mouth once daily., Disp: 90 tablet, Rfl: 0    fenofibrate (Tricor) 54 mg tablet, Take 1 tablet (54 mg) by mouth once daily., Disp: 90 tablet, Rfl: 1    propranolol LA (Inderal LA) 120 mg 24 hr capsule, Take 2 capsules (240 mg) by mouth once daily. Do not crush, chew, or split., Disp: 180 capsule, Rfl: 1

## 2025-07-30 NOTE — PROGRESS NOTES
[facility] [place]        Subjective   Chief Complaint   Patient presents with    Establish Care       New pt, surgical clearance. [facility] internal defibrillator 2005 2nd to [facility].       65-year patient with history of polymorphic VT with status post ICD placed.  No active chest pain tightness currently on a Lopressor, losartan as well as statin therapy.  Coming right shoulder surgery fairly active no active angina CHF sign symptoms.    She has a past medical history of AICD (automatic cardioverter/defibrillator) present (2005), Allergic, Anxiety (2003), Arrhythmia (2005), Arthritis, Catecholaminergic polymorphic ventricular tachycardia, Chronic pain disorder (2004), Depression (2003), Disease of thyroid gland, Hard to intubate, Heart disease, History of transfusion, Hyperlipidemia (???), Hypertension (2019), Hypothyroidism (????  10+ years), Joint pain (2020), Migraine, Nephrolithiasis (2004), and Spinal stenosis (2004).    She has no past medical history of ADD (attention deficit disorder), ADHD (attention deficit hyperactivity disorder), WINSTON (acute kidney injury), Autism ([facility]), Autoimmune disorder (Multi), Biliary disease, Bipolar disorder, Bladder cancer (Multi), BPH (benign prostatic hyperplasia), Cancer of neurologic system (Multi), Celiac disease ([facility]), Cerebral aneurysm ([facility]), Cerebral palsy, Cerebral vascular accident (Multi), Cervical cancer, Cervical disc disease, Cholelithiasis, Chronic kidney disease, Cirrhosis (Multi), CKD (chronic kidney disease), Cognitive decline, Cognitive disorder, Colon polyp, Colorectal cancer (Multi), Crohn's disease (Multi), Dementia, Diverticulosis, Dizziness, Dysfunctional uterine bleeding, Endometrial cancer (Multi), Esophageal cancer (Multi), Esophageal disease, ESRD (end stage renal disease) (Multi), Fibroid, Fibromyalgia, primary, Fractures, Gastric cancer (Multi), Gender dysphoria, GERD (gastroesophageal reflux  disease), Gestational diabetes, Gestational hypertension (HHS-HCC), GI (gastrointestinal bleed), Hemodialysis status, Hernia, internal, Hiatal hernia, History of peritoneal dialysis, HIV disease (Multi), Hydrocephalus, Immunocompromised, Intellectual disability, Irritable bowel syndrome, Liver disease, Lumbar disc disease, Lupus, Mastocytosis, MS (multiple sclerosis) (Multi), Muscular dystrophy (Multi), Myasthenia gravis, Myoneural disorder (Multi), Myotonia, PAGE (nonalcoholic steatohepatitis), Neuromuscular disorder (Multi), Ovarian cancer (Multi), Pancreatic cancer (Multi), Pancreatitis (HHS-HCC), Pelvic mass, Peptic ulcer disease, Peripheral neuropathy, Personal history of irradiation, Personal history of other mental and behavioral disorders, Polycystic ovarian disease, Pregnant (HHS-HCC), Prematurity (HHS-HCC), Prostate cancer (Multi), PTSD (post-traumatic stress disorder), Pyelonephritis, Renal cancer (Multi), Renal disease due to hypertension, Schizophrenia, Scoliosis, Seizure disorder (Multi), Substance addiction (Multi), Thoracic spinal cord injury (Multi), TIA (transient ischemic attack), Ulcerative colitis, Urinary tract infection, Uterine cancer (Multi), or Vertigo.  She has a past surgical history that includes Cardiac defibrillator placement (Left, ); Lumbar fusion ( and ); Lumbar laminectomy (); Oophorectomy (); Bunionectomy; FL guided contrast injection hip right (Right, 2023); Total shoulder arthroplasty (Right, 10/2023); Total knee arthroplasty (Bilateral);  section, low transverse (); Other surgical history (); FL guided contrast injection hip right (2024); Back surgery; Endometrial ablation (??? ); Joint replacement (,  & ); Tubal ligation (); Insert / replace / remove pacemaker (); and Spinal fusion (2014).   Family medical history includes sudden death in son.  Current Outpatient Medications   Medication Sig Dispense  Refill    atorvastatin (Lipitor) 40 mg tablet Take 1 tablet (40 mg) by mouth once daily. 90 tablet 1    buPROPion XL (Wellbutrin XL) 300 mg 24 hr tablet Take 1 tablet (300 mg) by mouth once daily in the morning. 90 tablet 1    cholecalciferol (Vitamin D-3) 25 MCG (1000 UT) capsule Take 1 capsule (25 mcg) by mouth once daily.      fenofibrate (Tricor) 54 mg tablet Take 1 tablet (54 mg) by mouth once daily. 90 tablet 1    levothyroxine (Synthroid, Levoxyl) 175 mcg tablet Take 1 tablet (175 mcg) by mouth once daily in the morning. Take before meals. 90 tablet 1    losartan (Cozaar) 100 mg tablet Take 1 tablet (100 mg) by mouth once daily. Last dose 5- 1100 90 tablet 1    multivitamin tablet Take 1 tablet by mouth once daily.      naproxen sodium (Aleve) 220 mg tablet Take 2 tablets (440 mg) by mouth 2 times daily (morning and late afternoon).      propranolol LA (Inderal LA) 120 mg 24 hr capsule Take 2 capsules (240 mg) by mouth once daily. Do not crush, chew, or split. 180 capsule 1    sertraline (Zoloft) 100 mg tablet Take 2 tablets (200 mg) by mouth once daily. 90 tablet 0     No current facility-administered medications for this visit.      reports that she has been smoking cigarettes. She started smoking about 52 years ago. She has a 52.6 pack-year smoking history. She has been exposed to tobacco smoke. She uses smokeless tobacco. She reports current alcohol use of about 5.0 standard drinks of alcohol per week. She reports that she does not use drugs.  Allergies:  Sulfa (sulfonamide antibiotics)    ROS: See HPI  CONSTITUTIONAL: Chills- none. Fever- none. Weight change appropriate for age.  HEENT: Headache- Negative.  Change in vision- none.  Ear pain- none. Nasal congestion- none. Post-nasal drip-none.  Sore throat-none.  CARDIOLOGY: Chest pain- none.  Leg edema-trace.  Murmurs-soft systolic.  Palpitation- none.  RESPIRATORY: Denies any shortness of breath.  GI: Abdominal pain- none.  Change in bowel  "habits- none.  Constipation- none.  Diarrhea- none.  Nausea- none.  Vomiting- none.  MUSCULOSKELETAL: Joint pain- none.  Muscle aches- none.  DERMATOLOGY: Rash- none.  NEUROLOGY: Dizziness- none.   Headache- none.  PSYCHIATRY: Denies any depression or anxiety     Vitals:    07/30/25 1337   BP: 120/74   Pulse: 72   Resp: 16   SpO2: 94%   Weight: 97.5 kg (215 lb)   PainSc: 0-No pain      BMI:Body mass index is 35.78 kg/m².   General Cardiology:  General Appearance: Alert, oriented and in no acute distress.  HEENT: extra ocular movements intact (EOMI), pupils equal,  round, reactive to light and accommodation (PERRLA).  Carotid Upstroke: no bruit, normal.  Jugular Venous Distention (JVD): flat.  Chest: normal.  Lungs: Clear to auscultation,   Heart Sounds: no S3 or S4, normal S1, S2, regular rate.  Murmur, Click, Gallop: soft systolic murmur.  Abdomen: no hepatomegaly, no masses felt, soft.  Extremities: no leg edema.  Peripheral pulses: 2 plus bilateral.  NEUROLOGY Cranial nerves II-XII grossly intact.     Last Labs:  CMP:  Recent Labs     07/29/25  0853 02/21/24  1423 10/03/23  1102    142 140   K 4.7 4.5 5.2*    99 100   CO2 30 27 28   ANIONGAP 12 16 12   BUN 26* 26* 30*   CREATININE 0.80 0.80 0.80   EGFR 81 82 82   GLUCOSE 112* 105* 100*     Recent Labs     07/29/25  0853 05/14/21  0844 12/17/20  1141   ALBUMIN 4.5 4.5 4.8   ALKPHOS 129 158* 142*   ALT 14 19 14   AST 20 25 22   BILITOT 0.6 0.4 0.5     CBC:  Recent Labs     07/29/25  0853 05/14/24  1116 02/21/24  1423   WBC 8.4 9.2 13.0*   HGB 13.3 14.2 15.0   HCT 40.9 43.8 47.3*    270 320   MCV 91 88 90     COAG: No results for input(s): \"INR\", \"DDIMERVTE\" in the last 57350 hours.  HEME/ENDO:  Recent Labs     07/29/25  0853 10/03/23  1102 05/14/21  0844   TSH 0.44 1.65 3.48   HGBA1C 5.3  --   --       CARDIAC: No results for input(s): \"LDH\", \"CKMB\", \"TROPHS\", \"BNP\" in the last 96181 hours.    No lab exists for component: \"CK\", \"CKMBP\"  Recent " Labs     07/29/25  0853 05/14/21  0844 12/17/20  1141   CHOL 225* 253* 205*   LDLCALC 109* Unable to report calculated LDL due to increased triglycerides. Direct 98   HDL 45.7 41* 47*   TRIG 350* 643* 298*       Last Cardiology Tests:  Echo:  Echo Results:  No results found for this or any previous visit from the past 3650 days.     Cath:  Stress Test:  Stress Results:  No results found for this or any previous visit from the past 365 days.     Cardiac Imaging:    Problem List Items Addressed This Visit       Essential hypertension - Primary    Relevant Orders    Referral to Pacemaker Clinic and Follow Up    Hyperlipidemia    Relevant Orders    Referral to Pacemaker Clinic and Follow Up    Long QT syndrome    Relevant Orders    Referral to Pacemaker Clinic and Follow Up    CPVT (catecholaminergic polymorphic ventricular tachycardia)    Relevant Orders    Referral to Pacemaker Clinic and Follow Up    Encounter for pre-operative cardiovascular clearance    Relevant Orders    Referral to Pacemaker Clinic and Follow Up    ICD (implantable cardioverter-defibrillator) in place    Relevant Orders    Referral to Pacemaker Clinic and Follow Up      65-year patient with no prior cardiac history except a history of polymorphic VT, mixed hyperlipidemia as well as long QT syndrome.  Upcoming right shoulder surgery.  No chest pain or tightness currently well-managed with the beta-blocker.  1.  Benign hypertension: Continue current losartan milligram tablet p.o. daily.  2.  History of polymorphic VT/ICD: Continue current propranolol 24 mg capsule p.o. daily.  3.  Mixed hyperlipidemia: Continue current atorvastatin 40 mg daily.  Patient also on Tricor for hypertriglyceridemia.  4.  Preop clearance: Patient stable cardiac wise, continue beta-blocker or rate control medication.  Moderate risk for CV events during the surgery.  No contraindication for surgery.  Hold blood thinner 48 hours before surgery.    Advised patient to avoid  lunch meats, canned soups, pizzas, bread rolls, and sandwiches. Advised patient to limit salt intake 1,500 mg daily. Advised patient to exercise 30 mins/3 times a week including treadmill or aerobic type, Goal to achieve 65% target HR.    Diet and exercise reviewed with patient..advice to walk about 10,000 steps or about 2 hours during day time. Cut back on salt, sugar and flour.    Pt. care time is spent includes independent review of diagnostic tests, labs, radiographs, EKGs and coordination of care. Assessment, impression and plans are reflected in the note above as well as the orders.    Cory Aragon MD  [place] [facility] [place]  [facility]

## 2025-07-30 NOTE — LETTER
July 30, 2025     Anderson Tenorio MD  [address]  [address]  [facility] [address]    Patient: Monse Aguilera   YOB: 1960   Date of Visit: 7/30/2025       Dear Dr. Anderson Tenorio MD:    Thank you for referring Monse Aguilera to me for evaluation. Below are my notes for this consultation.  If you have questions, please do not hesitate to call me. I look forward to following your patient along with you.  Patient stable cardiac wise, continue beta-blocker or rate control medication.  Moderate risk for CV events during the surgery.  No contraindication for surgery.  Hold blood thinner 48 hours before surgery.  Please apply magnet on ICD during surgery.    Sincerely,     Cory Aragon MD      CC: No Recipients  ______________________________________________________________________________________      [facility] [place]        Subjective  Chief Complaint   Patient presents with   • Establish Care       New pt, surgical clearance. [facility] internal defibrillator 2005 2nd to [facility].       65-year patient with history of polymorphic VT with status post ICD placed.  No active chest pain tightness currently on a Lopressor, losartan as well as statin therapy.  Coming right shoulder surgery fairly active no active angina CHF sign symptoms.    She has a past medical history of AICD (automatic cardioverter/defibrillator) present (2005), Allergic, Anxiety (2003), Arrhythmia (2005), Arthritis, Catecholaminergic polymorphic ventricular tachycardia, Chronic pain disorder (2004), Depression (2003), Disease of thyroid gland, Hard to intubate, Heart disease, History of transfusion, Hyperlipidemia (???), Hypertension (2019), Hypothyroidism (????  10+ years), Joint pain (2020), Migraine, Nephrolithiasis (2004), and Spinal stenosis (2004).    She has no past medical history of ADD (attention deficit disorder), ADHD (attention deficit hyperactivity disorder), WINSTON (acute kidney  injury), Autism ([facility]), Autoimmune disorder (Multi), Biliary disease, Bipolar disorder, Bladder cancer (Multi), BPH (benign prostatic hyperplasia), Cancer of neurologic system (Multi), Celiac disease ([facility]), Cerebral aneurysm ([facility]), Cerebral palsy, Cerebral vascular accident (Multi), Cervical cancer, Cervical disc disease, Cholelithiasis, Chronic kidney disease, Cirrhosis (Multi), CKD (chronic kidney disease), Cognitive decline, Cognitive disorder, Colon polyp, Colorectal cancer (Multi), Crohn's disease (Multi), Dementia, Diverticulosis, Dizziness, Dysfunctional uterine bleeding, Endometrial cancer (Multi), Esophageal cancer (Multi), Esophageal disease, ESRD (end stage renal disease) (Multi), Fibroid, Fibromyalgia, primary, Fractures, Gastric cancer (Multi), Gender dysphoria, GERD (gastroesophageal reflux disease), Gestational diabetes, Gestational hypertension ([facility]), GI (gastrointestinal bleed), Hemodialysis status, Hernia, internal, Hiatal hernia, History of peritoneal dialysis, HIV disease (Multi), Hydrocephalus, Immunocompromised, Intellectual disability, Irritable bowel syndrome, Liver disease, Lumbar disc disease, Lupus, Mastocytosis, MS (multiple sclerosis) (Multi), Muscular dystrophy (Multi), Myasthenia gravis, Myoneural disorder (Multi), Myotonia, PAGE (nonalcoholic steatohepatitis), Neuromuscular disorder (Multi), Ovarian cancer (Multi), Pancreatic cancer (Multi), Pancreatitis ([facility]), Pelvic mass, Peptic ulcer disease, Peripheral neuropathy, Personal history of irradiation, Personal history of other mental and behavioral disorders, Polycystic ovarian disease, Pregnant ([facility]), Prematurity ([facility]), Prostate cancer (Multi), PTSD (post-traumatic stress disorder), Pyelonephritis, Renal cancer (Multi), Renal disease due to hypertension, Schizophrenia, Scoliosis, Seizure disorder (Multi), Substance addiction (Multi), Thoracic spinal cord injury (Multi), TIA (transient ischemic attack),  Ulcerative colitis, Urinary tract infection, Uterine cancer (Multi), or Vertigo.  She has a past surgical history that includes Cardiac defibrillator placement (Left, ); Lumbar fusion ( and ); Lumbar laminectomy (); Oophorectomy (); Bunionectomy; FL guided contrast injection hip right (Right, 2023); Total shoulder arthroplasty (Right, 10/2023); Total knee arthroplasty (Bilateral);  section, low transverse (); Other surgical history (); FL guided contrast injection hip right (2024); Back surgery; Endometrial ablation (??? ); Joint replacement (,  & ); Tubal ligation (); Insert / replace / remove pacemaker (); and Spinal fusion (2014).   Family medical history includes sudden death in son.  Current Outpatient Medications   Medication Sig Dispense Refill   • atorvastatin (Lipitor) 40 mg tablet Take 1 tablet (40 mg) by mouth once daily. 90 tablet 1   • buPROPion XL (Wellbutrin XL) 300 mg 24 hr tablet Take 1 tablet (300 mg) by mouth once daily in the morning. 90 tablet 1   • cholecalciferol (Vitamin D-3) 25 MCG (1000 UT) capsule Take 1 capsule (25 mcg) by mouth once daily.     • fenofibrate (Tricor) 54 mg tablet Take 1 tablet (54 mg) by mouth once daily. 90 tablet 1   • levothyroxine (Synthroid, Levoxyl) 175 mcg tablet Take 1 tablet (175 mcg) by mouth once daily in the morning. Take before meals. 90 tablet 1   • losartan (Cozaar) 100 mg tablet Take 1 tablet (100 mg) by mouth once daily. Last dose 2024 1100 90 tablet 1   • multivitamin tablet Take 1 tablet by mouth once daily.     • naproxen sodium (Aleve) 220 mg tablet Take 2 tablets (440 mg) by mouth 2 times daily (morning and late afternoon).     • propranolol LA (Inderal LA) 120 mg 24 hr capsule Take 2 capsules (240 mg) by mouth once daily. Do not crush, chew, or split. 180 capsule 1   • sertraline (Zoloft) 100 mg tablet Take 2 tablets (200 mg) by mouth once daily. 90 tablet 0     No  current facility-administered medications for this visit.      reports that she has been smoking cigarettes. She started smoking about 52 years ago. She has a 52.6 pack-year smoking history. She has been exposed to tobacco smoke. She uses smokeless tobacco. She reports current alcohol use of about 5.0 standard drinks of alcohol per week. She reports that she does not use drugs.  Allergies:  Sulfa (sulfonamide antibiotics)    ROS: See HPI  CONSTITUTIONAL: Chills- none. Fever- none. Weight change appropriate for age.  HEENT: Headache- Negative.  Change in vision- none.  Ear pain- none. Nasal congestion- none. Post-nasal drip-none.  Sore throat-none.  CARDIOLOGY: Chest pain- none.  Leg edema-trace.  Murmurs-soft systolic.  Palpitation- none.  RESPIRATORY: Denies any shortness of breath.  GI: Abdominal pain- none.  Change in bowel habits- none.  Constipation- none.  Diarrhea- none.  Nausea- none.  Vomiting- none.  MUSCULOSKELETAL: Joint pain- none.  Muscle aches- none.  DERMATOLOGY: Rash- none.  NEUROLOGY: Dizziness- none.   Headache- none.  PSYCHIATRY: Denies any depression or anxiety     Vitals:    07/30/25 1337   BP: 120/74   Pulse: 72   Resp: 16   SpO2: 94%   Weight: 97.5 kg (215 lb)   PainSc: 0-No pain      BMI:Body mass index is 35.78 kg/m².   General Cardiology:  General Appearance: Alert, oriented and in no acute distress.  HEENT: extra ocular movements intact (EOMI), pupils equal,  round, reactive to light and accommodation (PERRLA).  Carotid Upstroke: no bruit, normal.  Jugular Venous Distention (JVD): flat.  Chest: normal.  Lungs: Clear to auscultation,   Heart Sounds: no S3 or S4, normal S1, S2, regular rate.  Murmur, Click, Gallop: soft systolic murmur.  Abdomen: no hepatomegaly, no masses felt, soft.  Extremities: no leg edema.  Peripheral pulses: 2 plus bilateral.  NEUROLOGY Cranial nerves II-XII grossly intact.     Last Labs:  CMP:  Recent Labs     07/29/25  0853 02/21/24  1423 10/03/23  1102     "142 140   K 4.7 4.5 5.2*    99 100   CO2 30 27 28   ANIONGAP 12 16 12   BUN 26* 26* 30*   CREATININE 0.80 0.80 0.80   EGFR 81 82 82   GLUCOSE 112* 105* 100*     Recent Labs     07/29/25  0853 05/14/21  0844 12/17/20  1141   ALBUMIN 4.5 4.5 4.8   ALKPHOS 129 158* 142*   ALT 14 19 14   AST 20 25 22   BILITOT 0.6 0.4 0.5     CBC:  Recent Labs     07/29/25  0853 05/14/24  1116 02/21/24  1423   WBC 8.4 9.2 13.0*   HGB 13.3 14.2 15.0   HCT 40.9 43.8 47.3*    270 320   MCV 91 88 90     COAG: No results for input(s): \"INR\", \"DDIMERVTE\" in the last 80133 hours.  HEME/ENDO:  Recent Labs     07/29/25  0853 10/03/23  1102 05/14/21  0844   TSH 0.44 1.65 3.48   HGBA1C 5.3  --   --       CARDIAC: No results for input(s): \"LDH\", \"CKMB\", \"TROPHS\", \"BNP\" in the last 87383 hours.    No lab exists for component: \"CK\", \"CKMBP\"  Recent Labs     07/29/25  0853 05/14/21  0844 12/17/20  1141   CHOL 225* 253* 205*   LDLCALC 109* Unable to report calculated LDL due to increased triglycerides. Direct 98   HDL 45.7 41* 47*   TRIG 350* 643* 298*       Last Cardiology Tests:  Echo:  Echo Results:  No results found for this or any previous visit from the past 3650 days.     Cath:  Stress Test:  Stress Results:  No results found for this or any previous visit from the past 365 days.     Cardiac Imaging:    Problem List Items Addressed This Visit       Essential hypertension - Primary    Relevant Orders    Referral to Pacemaker Clinic and Follow Up    Hyperlipidemia    Relevant Orders    Referral to Pacemaker Clinic and Follow Up    Long QT syndrome    Relevant Orders    Referral to Pacemaker Clinic and Follow Up    CPVT (catecholaminergic polymorphic ventricular tachycardia)    Relevant Orders    Referral to Pacemaker Clinic and Follow Up    Encounter for pre-operative cardiovascular clearance    Relevant Orders    Referral to Pacemaker Clinic and Follow Up    ICD (implantable cardioverter-defibrillator) in place    Relevant Orders    " Referral to Pacemaker Clinic and Follow Up      65-year patient with no prior cardiac history except a history of polymorphic VT, mixed hyperlipidemia as well as long QT syndrome.  Upcoming right shoulder surgery.  No chest pain or tightness currently well-managed with the beta-blocker.  1.  Benign hypertension: Continue current losartan milligram tablet p.o. daily.  2.  History of polymorphic VT/ICD: Continue current propranolol 24 mg capsule p.o. daily.  3.  Mixed hyperlipidemia: Continue current atorvastatin 40 mg daily.  Patient also on Tricor for hypertriglyceridemia.  4.  Preop clearance: Patient stable cardiac wise, continue beta-blocker or rate control medication.  Moderate risk for CV events during the surgery.  No contraindication for surgery.  Hold blood thinner 48 hours before surgery.    Advised patient to avoid lunch meats, canned soups, pizzas, bread rolls, and sandwiches. Advised patient to limit salt intake 1,500 mg daily. Advised patient to exercise 30 mins/3 times a week including treadmill or aerobic type, Goal to achieve 65% target HR.    Diet and exercise reviewed with patient..advice to walk about 10,000 steps or about 2 hours during day time. Cut back on salt, sugar and flour.    Pt. care time is spent includes independent review of diagnostic tests, labs, radiographs, EKGs and coordination of care. Assessment, impression and plans are reflected in the note above as well as the orders.    Cory Aragon MD  [place] [place]  [facility]

## 2025-07-30 NOTE — ASSESSMENT & PLAN NOTE
Orders:    Tsh With Reflex To Free T4 If Abnormal; Future  Begin synthroid Monday through Saturday- skipping Sunday dose.   Repeat TSH in 3 months

## 2025-07-30 NOTE — ASSESSMENT & PLAN NOTE
Begin Tricor once daily. Repeat fasting lipid panel in 12 weeks     Decrease intake of saturated fats, fast food, sweets.  Increase intake of fresh fruit fresh vegetables and lean meats.  Increase healthy fats seeds, nuts, olive oil instead of butter.  walk 150 minutes/week for heart health.   Aim for 25-30 grams of fiber in your diet daily.  May consider adding Fish Oil supplement 1,200 mg per day or Omega 3 Supplement daily.      LAB Order/ BLOOD TESTS   I have ordered lab work for you to get done. This should be fasting. Nothing to eat or drink after midnight besides black tea,  black coffee, or water. If you do not hear from this office within two days of having your labs done, please call for your results.   Please call to schedule an appointment:   [employer] Scheduling phone number is [phone]  You can also schedule an appointment online by logging into   [email or web address]

## 2025-07-30 NOTE — H&P (VIEW-ONLY)
[facility] [place]        Subjective   Chief Complaint   Patient presents with    Establish Care       New pt, surgical clearance. [facility] internal defibrillator 2005 2nd to CPVT.       65-year patient with history of polymorphic VT with status post ICD placed.  No active chest pain tightness currently on a Lopressor, losartan as well as statin therapy.  Coming right shoulder surgery fairly active no active angina CHF sign symptoms.    She has a past medical history of AICD (automatic cardioverter/defibrillator) present (2005), Allergic, Anxiety (2003), Arrhythmia (2005), Arthritis, Catecholaminergic polymorphic ventricular tachycardia, Chronic pain disorder (2004), Depression (2003), Disease of thyroid gland, Hard to intubate, Heart disease, History of transfusion, Hyperlipidemia (???), Hypertension (2019), Hypothyroidism (????  10+ years), Joint pain (2020), Migraine, Nephrolithiasis (2004), and Spinal stenosis (2004).    She has no past medical history of ADD (attention deficit disorder), ADHD (attention deficit hyperactivity disorder), WINSTON (acute kidney injury), Autism ([facility]), Autoimmune disorder (Multi), Biliary disease, Bipolar disorder, Bladder cancer (Multi), BPH (benign prostatic hyperplasia), Cancer of neurologic system (Multi), Celiac disease ([facility]), Cerebral aneurysm ([facility]), Cerebral palsy, Cerebral vascular accident (Multi), Cervical cancer, Cervical disc disease, Cholelithiasis, Chronic kidney disease, Cirrhosis (Multi), CKD (chronic kidney disease), Cognitive decline, Cognitive disorder, Colon polyp, Colorectal cancer (Multi), Crohn's disease (Multi), Dementia, Diverticulosis, Dizziness, Dysfunctional uterine bleeding, Endometrial cancer (Multi), Esophageal cancer (Multi), Esophageal disease, ESRD (end stage renal disease) (Multi), Fibroid, Fibromyalgia, primary, Fractures, Gastric cancer (Multi), Gender dysphoria, GERD (gastroesophageal reflux  disease), Gestational diabetes, Gestational hypertension (HHS-HCC), GI (gastrointestinal bleed), Hemodialysis status, Hernia, internal, Hiatal hernia, History of peritoneal dialysis, HIV disease (Multi), Hydrocephalus, Immunocompromised, Intellectual disability, Irritable bowel syndrome, Liver disease, Lumbar disc disease, Lupus, Mastocytosis, MS (multiple sclerosis) (Multi), Muscular dystrophy (Multi), Myasthenia gravis, Myoneural disorder (Multi), Myotonia, PAGE (nonalcoholic steatohepatitis), Neuromuscular disorder (Multi), Ovarian cancer (Multi), Pancreatic cancer (Multi), Pancreatitis (HHS-HCC), Pelvic mass, Peptic ulcer disease, Peripheral neuropathy, Personal history of irradiation, Personal history of other mental and behavioral disorders, Polycystic ovarian disease, Pregnant (HHS-HCC), Prematurity (HHS-HCC), Prostate cancer (Multi), PTSD (post-traumatic stress disorder), Pyelonephritis, Renal cancer (Multi), Renal disease due to hypertension, Schizophrenia, Scoliosis, Seizure disorder (Multi), Substance addiction (Multi), Thoracic spinal cord injury (Multi), TIA (transient ischemic attack), Ulcerative colitis, Urinary tract infection, Uterine cancer (Multi), or Vertigo.  She has a past surgical history that includes Cardiac defibrillator placement (Left, ); Lumbar fusion ( and ); Lumbar laminectomy (); Oophorectomy (); Bunionectomy; FL guided contrast injection hip right (Right, 2023); Total shoulder arthroplasty (Right, 10/2023); Total knee arthroplasty (Bilateral);  section, low transverse (); Other surgical history (); FL guided contrast injection hip right (2024); Back surgery; Endometrial ablation (??? ); Joint replacement (,  & ); Tubal ligation (); Insert / replace / remove pacemaker (); and Spinal fusion (2014).   Family medical history includes sudden death in son.  Current Outpatient Medications   Medication Sig Dispense  Refill    atorvastatin (Lipitor) 40 mg tablet Take 1 tablet (40 mg) by mouth once daily. 90 tablet 1    buPROPion XL (Wellbutrin XL) 300 mg 24 hr tablet Take 1 tablet (300 mg) by mouth once daily in the morning. 90 tablet 1    cholecalciferol (Vitamin D-3) 25 MCG (1000 UT) capsule Take 1 capsule (25 mcg) by mouth once daily.      fenofibrate (Tricor) 54 mg tablet Take 1 tablet (54 mg) by mouth once daily. 90 tablet 1    levothyroxine (Synthroid, Levoxyl) 175 mcg tablet Take 1 tablet (175 mcg) by mouth once daily in the morning. Take before meals. 90 tablet 1    losartan (Cozaar) 100 mg tablet Take 1 tablet (100 mg) by mouth once daily. Last dose 5- 1100 90 tablet 1    multivitamin tablet Take 1 tablet by mouth once daily.      naproxen sodium (Aleve) 220 mg tablet Take 2 tablets (440 mg) by mouth 2 times daily (morning and late afternoon).      propranolol LA (Inderal LA) 120 mg 24 hr capsule Take 2 capsules (240 mg) by mouth once daily. Do not crush, chew, or split. 180 capsule 1    sertraline (Zoloft) 100 mg tablet Take 2 tablets (200 mg) by mouth once daily. 90 tablet 0     No current facility-administered medications for this visit.      reports that she has been smoking cigarettes. She started smoking about 52 years ago. She has a 52.6 pack-year smoking history. She has been exposed to tobacco smoke. She uses smokeless tobacco. She reports current alcohol use of about 5.0 standard drinks of alcohol per week. She reports that she does not use drugs.  Allergies:  Sulfa (sulfonamide antibiotics)    ROS: See HPI  CONSTITUTIONAL: Chills- none. Fever- none. Weight change appropriate for age.  HEENT: Headache- Negative.  Change in vision- none.  Ear pain- none. Nasal congestion- none. Post-nasal drip-none.  Sore throat-none.  CARDIOLOGY: Chest pain- none.  Leg edema-trace.  Murmurs-soft systolic.  Palpitation- none.  RESPIRATORY: Denies any shortness of breath.  GI: Abdominal pain- none.  Change in bowel  "habits- none.  Constipation- none.  Diarrhea- none.  Nausea- none.  Vomiting- none.  MUSCULOSKELETAL: Joint pain- none.  Muscle aches- none.  DERMATOLOGY: Rash- none.  NEUROLOGY: Dizziness- none.   Headache- none.  PSYCHIATRY: Denies any depression or anxiety     Vitals:    07/30/25 1337   BP: 120/74   Pulse: 72   Resp: 16   SpO2: 94%   Weight: 97.5 kg (215 lb)   PainSc: 0-No pain      BMI:Body mass index is 35.78 kg/m².   General Cardiology:  General Appearance: Alert, oriented and in no acute distress.  HEENT: extra ocular movements intact (EOMI), pupils equal,  round, reactive to light and accommodation (PERRLA).  Carotid Upstroke: no bruit, normal.  Jugular Venous Distention (JVD): flat.  Chest: normal.  Lungs: Clear to auscultation,   Heart Sounds: no S3 or S4, normal S1, S2, regular rate.  Murmur, Click, Gallop: soft systolic murmur.  Abdomen: no hepatomegaly, no masses felt, soft.  Extremities: no leg edema.  Peripheral pulses: 2 plus bilateral.  NEUROLOGY Cranial nerves II-XII grossly intact.     Last Labs:  CMP:  Recent Labs     07/29/25  0853 02/21/24  1423 10/03/23  1102    142 140   K 4.7 4.5 5.2*    99 100   CO2 30 27 28   ANIONGAP 12 16 12   BUN 26* 26* 30*   CREATININE 0.80 0.80 0.80   EGFR 81 82 82   GLUCOSE 112* 105* 100*     Recent Labs     07/29/25  0853 05/14/21  0844 12/17/20  1141   ALBUMIN 4.5 4.5 4.8   ALKPHOS 129 158* 142*   ALT 14 19 14   AST 20 25 22   BILITOT 0.6 0.4 0.5     CBC:  Recent Labs     07/29/25  0853 05/14/24  1116 02/21/24  1423   WBC 8.4 9.2 13.0*   HGB 13.3 14.2 15.0   HCT 40.9 43.8 47.3*    270 320   MCV 91 88 90     COAG: No results for input(s): \"INR\", \"DDIMERVTE\" in the last 49885 hours.  HEME/ENDO:  Recent Labs     07/29/25  0853 10/03/23  1102 05/14/21  0844   TSH 0.44 1.65 3.48   HGBA1C 5.3  --   --       CARDIAC: No results for input(s): \"LDH\", \"CKMB\", \"TROPHS\", \"BNP\" in the last 25743 hours.    No lab exists for component: \"CK\", \"CKMBP\"  Recent " Labs     07/29/25  0853 05/14/21  0844 12/17/20  1141   CHOL 225* 253* 205*   LDLCALC 109* Unable to report calculated LDL due to increased triglycerides. Direct 98   HDL 45.7 41* 47*   TRIG 350* 643* 298*       Last Cardiology Tests:  Echo:  Echo Results:  No results found for this or any previous visit from the past 3650 days.     Cath:  Stress Test:  Stress Results:  No results found for this or any previous visit from the past 365 days.     Cardiac Imaging:    Problem List Items Addressed This Visit       Essential hypertension - Primary    Relevant Orders    Referral to Pacemaker Clinic and Follow Up    Hyperlipidemia    Relevant Orders    Referral to Pacemaker Clinic and Follow Up    Long QT syndrome    Relevant Orders    Referral to Pacemaker Clinic and Follow Up    CPVT (catecholaminergic polymorphic ventricular tachycardia)    Relevant Orders    Referral to Pacemaker Clinic and Follow Up    Encounter for pre-operative cardiovascular clearance    Relevant Orders    Referral to Pacemaker Clinic and Follow Up    ICD (implantable cardioverter-defibrillator) in place    Relevant Orders    Referral to Pacemaker Clinic and Follow Up      65-year patient with no prior cardiac history except a history of polymorphic VT, mixed hyperlipidemia as well as long QT syndrome.  Upcoming right shoulder surgery.  No chest pain or tightness currently well-managed with the beta-blocker.  1.  Benign hypertension: Continue current losartan milligram tablet p.o. daily.  2.  History of polymorphic VT/ICD: Continue current propranolol 24 mg capsule p.o. daily.  3.  Mixed hyperlipidemia: Continue current atorvastatin 40 mg daily.  Patient also on Tricor for hypertriglyceridemia.  4.  Preop clearance: Patient stable cardiac wise, continue beta-blocker or rate control medication.  Moderate risk for CV events during the surgery.  No contraindication for surgery.  Hold blood thinner 48 hours before surgery.    Advised patient to avoid  lunch meats, canned soups, pizzas, bread rolls, and sandwiches. Advised patient to limit salt intake 1,500 mg daily. Advised patient to exercise 30 mins/3 times a week including treadmill or aerobic type, Goal to achieve 65% target HR.    Diet and exercise reviewed with patient..advice to walk about 10,000 steps or about 2 hours during day time. Cut back on salt, sugar and flour.    Pt. care time is spent includes independent review of diagnostic tests, labs, radiographs, EKGs and coordination of care. Assessment, impression and plans are reflected in the note above as well as the orders.    Cory Aragon MD  [facility] [place]  [facility]

## 2025-07-31 ENCOUNTER — APPOINTMENT (OUTPATIENT)
Dept: PREADMISSION TESTING | Facility: HOSPITAL | Age: 65
End: 2025-07-31
Payer: MEDICARE

## 2025-07-31 ENCOUNTER — ANESTHESIA EVENT (OUTPATIENT)
Dept: OPERATING ROOM | Facility: HOSPITAL | Age: 65
End: 2025-07-31
Payer: MEDICARE

## 2025-08-01 ENCOUNTER — HOSPITAL ENCOUNTER (INPATIENT)
Facility: HOSPITAL | Age: 65
LOS: 1 days | Discharge: HOME | DRG: 483 | End: 2025-08-01
Attending: ORTHOPAEDIC SURGERY | Admitting: ORTHOPAEDIC SURGERY
Payer: MEDICARE

## 2025-08-01 ENCOUNTER — APPOINTMENT (OUTPATIENT)
Dept: RADIOLOGY | Facility: HOSPITAL | Age: 65
DRG: 483 | End: 2025-08-01
Payer: MEDICARE

## 2025-08-01 ENCOUNTER — PHARMACY VISIT (OUTPATIENT)
Dept: PHARMACY | Facility: CLINIC | Age: 65
End: 2025-08-01
Payer: COMMERCIAL

## 2025-08-01 ENCOUNTER — ANESTHESIA (OUTPATIENT)
Dept: OPERATING ROOM | Facility: HOSPITAL | Age: 65
End: 2025-08-01
Payer: MEDICARE

## 2025-08-01 VITALS
RESPIRATION RATE: 17 BRPM | SYSTOLIC BLOOD PRESSURE: 126 MMHG | HEART RATE: 74 BPM | DIASTOLIC BLOOD PRESSURE: 87 MMHG | TEMPERATURE: 97 F | OXYGEN SATURATION: 94 %

## 2025-08-01 DIAGNOSIS — M19.011 ARTHRITIS OF RIGHT SHOULDER REGION: ICD-10-CM

## 2025-08-01 DIAGNOSIS — T84.84XA PAIN IN PROSTHETIC JOINT, INITIAL ENCOUNTER: ICD-10-CM

## 2025-08-01 DIAGNOSIS — M19.90 ARTHRITIS: Primary | ICD-10-CM

## 2025-08-01 PROCEDURE — 1210000006 HC SEMI PRIVATE ROOM - IP ONLY PROCEDURE WITH INTENT

## 2025-08-01 PROCEDURE — 2500000004 HC RX 250 GENERAL PHARMACY W/ HCPCS (ALT 636 FOR OP/ED): Performed by: ANESTHESIOLOGY

## 2025-08-01 PROCEDURE — 7100000010 HC PHASE TWO TIME - EACH INCREMENTAL 1 MINUTE: Performed by: ORTHOPAEDIC SURGERY

## 2025-08-01 PROCEDURE — 3700000002 HC GENERAL ANESTHESIA TIME - EACH INCREMENTAL 1 MINUTE: Performed by: ORTHOPAEDIC SURGERY

## 2025-08-01 PROCEDURE — 73030 X-RAY EXAM OF SHOULDER: CPT | Mod: RIGHT SIDE | Performed by: RADIOLOGY

## 2025-08-01 PROCEDURE — 3700000001 HC GENERAL ANESTHESIA TIME - INITIAL BASE CHARGE: Performed by: ORTHOPAEDIC SURGERY

## 2025-08-01 PROCEDURE — C1776 JOINT DEVICE (IMPLANTABLE): HCPCS | Performed by: ORTHOPAEDIC SURGERY

## 2025-08-01 PROCEDURE — 3600000010 HC OR TIME - EACH INCREMENTAL 1 MINUTE - PROCEDURE LEVEL FIVE: Performed by: ORTHOPAEDIC SURGERY

## 2025-08-01 PROCEDURE — 2500000005 HC RX 250 GENERAL PHARMACY W/O HCPCS: Performed by: ORTHOPAEDIC SURGERY

## 2025-08-01 PROCEDURE — 7100000009 HC PHASE TWO TIME - INITIAL BASE CHARGE: Performed by: ORTHOPAEDIC SURGERY

## 2025-08-01 PROCEDURE — RXMED WILLOW AMBULATORY MEDICATION CHARGE

## 2025-08-01 PROCEDURE — 7100000001 HC RECOVERY ROOM TIME - INITIAL BASE CHARGE: Performed by: ORTHOPAEDIC SURGERY

## 2025-08-01 PROCEDURE — 0RPJ0JZ REMOVAL OF SYNTHETIC SUBSTITUTE FROM RIGHT SHOULDER JOINT, OPEN APPROACH: ICD-10-PCS | Performed by: ORTHOPAEDIC SURGERY

## 2025-08-01 PROCEDURE — 1210000001 HC SEMI-PRIVATE ROOM DAILY

## 2025-08-01 PROCEDURE — L3670 SO ACRO/CLAV CAN WEB PRE OTS: HCPCS | Performed by: ORTHOPAEDIC SURGERY

## 2025-08-01 PROCEDURE — 73030 X-RAY EXAM OF SHOULDER: CPT | Mod: RT

## 2025-08-01 PROCEDURE — C1713 ANCHOR/SCREW BN/BN,TIS/BN: HCPCS | Performed by: ORTHOPAEDIC SURGERY

## 2025-08-01 PROCEDURE — 3600000005 HC OR TIME - INITIAL BASE CHARGE - PROCEDURE LEVEL FIVE: Performed by: ORTHOPAEDIC SURGERY

## 2025-08-01 PROCEDURE — 2500000004 HC RX 250 GENERAL PHARMACY W/ HCPCS (ALT 636 FOR OP/ED)

## 2025-08-01 PROCEDURE — 87205 SMEAR GRAM STAIN: CPT | Mod: TRILAB | Performed by: ORTHOPAEDIC SURGERY

## 2025-08-01 PROCEDURE — 2740000001 HC OR 274 NO HCPCS: Performed by: ORTHOPAEDIC SURGERY

## 2025-08-01 PROCEDURE — 2780000003 HC OR 278 NO HCPCS: Performed by: ORTHOPAEDIC SURGERY

## 2025-08-01 PROCEDURE — 0RRJ00Z REPLACEMENT OF RIGHT SHOULDER JOINT WITH REVERSE BALL AND SOCKET SYNTHETIC SUBSTITUTE, OPEN APPROACH: ICD-10-PCS | Performed by: ORTHOPAEDIC SURGERY

## 2025-08-01 PROCEDURE — 2720000007 HC OR 272 NO HCPCS: Performed by: ORTHOPAEDIC SURGERY

## 2025-08-01 PROCEDURE — 7100000002 HC RECOVERY ROOM TIME - EACH INCREMENTAL 1 MINUTE: Performed by: ORTHOPAEDIC SURGERY

## 2025-08-01 PROCEDURE — 2500000002 HC RX 250 W HCPCS SELF ADMINISTERED DRUGS (ALT 637 FOR MEDICARE OP, ALT 636 FOR OP/ED): Performed by: ANESTHESIOLOGY

## 2025-08-01 PROCEDURE — 2500000005 HC RX 250 GENERAL PHARMACY W/O HCPCS

## 2025-08-01 PROCEDURE — 2500000001 HC RX 250 WO HCPCS SELF ADMINISTERED DRUGS (ALT 637 FOR MEDICARE OP): Performed by: ANESTHESIOLOGY

## 2025-08-01 PROCEDURE — 2500000004 HC RX 250 GENERAL PHARMACY W/ HCPCS (ALT 636 FOR OP/ED): Performed by: ORTHOPAEDIC SURGERY

## 2025-08-01 DEVICE — IMPLANTABLE DEVICE: Type: IMPLANTABLE DEVICE | Site: SHOULDER | Status: FUNCTIONAL

## 2025-08-01 DEVICE — SCREW, LOCKING, 5.5MM X 20MM: Type: IMPLANTABLE DEVICE | Site: SHOULDER | Status: FUNCTIONAL

## 2025-08-01 DEVICE — GLENOSPHERE, 36/24 BASEPLATE TAPER: Type: IMPLANTABLE DEVICE | Site: SHOULDER | Status: FUNCTIONAL

## 2025-08-01 DEVICE — SCREW, NON-LOCKING, 4.5MM X 24MM: Type: IMPLANTABLE DEVICE | Site: SHOULDER | Status: FUNCTIONAL

## 2025-08-01 DEVICE — IMPLANTABLE DEVICE: Type: IMPLANTABLE DEVICE | Site: SHOULDER | Status: NON-FUNCTIONAL

## 2025-08-01 DEVICE — GLENOID PIN, TARGETER, 2.8MM, STAINLESS: Type: IMPLANTABLE DEVICE | Site: SHOULDER | Status: FUNCTIONAL

## 2025-08-01 DEVICE — BASEPLATE, GLENIOD, MODULAR, 24MM +4 LAT: Type: IMPLANTABLE DEVICE | Site: SHOULDER | Status: FUNCTIONAL

## 2025-08-01 DEVICE — POST, MODULAR CENTRAL, 25MM: Type: IMPLANTABLE DEVICE | Site: SHOULDER | Status: FUNCTIONAL

## 2025-08-01 DEVICE — STEM, UNIVERS REVERS APEX, SZ 6: Type: IMPLANTABLE DEVICE | Site: SHOULDER | Status: FUNCTIONAL

## 2025-08-01 RX ORDER — NORETHINDRONE AND ETHINYL ESTRADIOL 0.5-0.035
50 KIT ORAL ONCE AS NEEDED
Status: CANCELLED | OUTPATIENT
Start: 2025-08-01

## 2025-08-01 RX ORDER — VANCOMYCIN HYDROCHLORIDE 1 G/20ML
INJECTION, POWDER, LYOPHILIZED, FOR SOLUTION INTRAVENOUS AS NEEDED
Status: DISCONTINUED | OUTPATIENT
Start: 2025-08-01 | End: 2025-08-01 | Stop reason: HOSPADM

## 2025-08-01 RX ORDER — OXYCODONE HYDROCHLORIDE 5 MG/1
5 TABLET ORAL EVERY 6 HOURS PRN
Qty: 25 TABLET | Refills: 0 | Status: SHIPPED | OUTPATIENT
Start: 2025-08-01

## 2025-08-01 RX ORDER — CEPHALEXIN 500 MG/1
500 CAPSULE ORAL 3 TIMES DAILY
Qty: 42 CAPSULE | Refills: 0 | Status: SHIPPED | OUTPATIENT
Start: 2025-08-01 | End: 2025-08-15

## 2025-08-01 RX ORDER — MEPERIDINE HYDROCHLORIDE 25 MG/ML
12.5 INJECTION INTRAMUSCULAR; INTRAVENOUS; SUBCUTANEOUS EVERY 10 MIN PRN
Status: DISCONTINUED | OUTPATIENT
Start: 2025-08-01 | End: 2025-08-01 | Stop reason: HOSPADM

## 2025-08-01 RX ORDER — ALBUTEROL SULFATE 0.83 MG/ML
2.5 SOLUTION RESPIRATORY (INHALATION) ONCE AS NEEDED
Status: COMPLETED | OUTPATIENT
Start: 2025-08-01 | End: 2025-08-01

## 2025-08-01 RX ORDER — HYDRALAZINE HYDROCHLORIDE 20 MG/ML
10 INJECTION INTRAMUSCULAR; INTRAVENOUS EVERY 30 MIN PRN
Status: DISCONTINUED | OUTPATIENT
Start: 2025-08-01 | End: 2025-08-01 | Stop reason: HOSPADM

## 2025-08-01 RX ORDER — METOCLOPRAMIDE 10 MG/1
10 TABLET ORAL ONCE
Status: COMPLETED | OUTPATIENT
Start: 2025-08-01 | End: 2025-08-01

## 2025-08-01 RX ORDER — FENTANYL CITRATE 50 UG/ML
INJECTION, SOLUTION INTRAMUSCULAR; INTRAVENOUS AS NEEDED
Status: DISCONTINUED | OUTPATIENT
Start: 2025-08-01 | End: 2025-08-01

## 2025-08-01 RX ORDER — ROCURONIUM BROMIDE 10 MG/ML
INJECTION, SOLUTION INTRAVENOUS AS NEEDED
Status: DISCONTINUED | OUTPATIENT
Start: 2025-08-01 | End: 2025-08-01

## 2025-08-01 RX ORDER — DIPHENHYDRAMINE HYDROCHLORIDE 50 MG/ML
12.5 INJECTION, SOLUTION INTRAMUSCULAR; INTRAVENOUS ONCE AS NEEDED
Status: DISCONTINUED | OUTPATIENT
Start: 2025-08-01 | End: 2025-08-01 | Stop reason: HOSPADM

## 2025-08-01 RX ORDER — TRANEXAMIC ACID 1 G/10ML
INJECTION, SOLUTION INTRAVENOUS AS NEEDED
Status: DISCONTINUED | OUTPATIENT
Start: 2025-08-01 | End: 2025-08-01

## 2025-08-01 RX ORDER — ONDANSETRON HYDROCHLORIDE 2 MG/ML
4 INJECTION, SOLUTION INTRAVENOUS ONCE AS NEEDED
Status: DISCONTINUED | OUTPATIENT
Start: 2025-08-01 | End: 2025-08-01 | Stop reason: HOSPADM

## 2025-08-01 RX ORDER — ONDANSETRON 4 MG/1
4 TABLET, ORALLY DISINTEGRATING ORAL ONCE AS NEEDED
Status: CANCELLED | OUTPATIENT
Start: 2025-08-01

## 2025-08-01 RX ORDER — PROPOFOL 10 MG/ML
INJECTION, EMULSION INTRAVENOUS AS NEEDED
Status: DISCONTINUED | OUTPATIENT
Start: 2025-08-01 | End: 2025-08-01

## 2025-08-01 RX ORDER — HYDROMORPHONE HYDROCHLORIDE 0.2 MG/ML
0.1 INJECTION INTRAMUSCULAR; INTRAVENOUS; SUBCUTANEOUS EVERY 5 MIN PRN
Status: DISCONTINUED | OUTPATIENT
Start: 2025-08-01 | End: 2025-08-01 | Stop reason: HOSPADM

## 2025-08-01 RX ORDER — PHENYLEPHRINE HYDROCHLORIDE 10 MG/ML
INJECTION INTRAVENOUS AS NEEDED
Status: DISCONTINUED | OUTPATIENT
Start: 2025-08-01 | End: 2025-08-01

## 2025-08-01 RX ORDER — NORETHINDRONE AND ETHINYL ESTRADIOL 0.5-0.035
10 KIT ORAL EVERY 5 MIN PRN
Status: DISCONTINUED | OUTPATIENT
Start: 2025-08-01 | End: 2025-08-01 | Stop reason: HOSPADM

## 2025-08-01 RX ORDER — FENTANYL CITRATE 50 UG/ML
50 INJECTION, SOLUTION INTRAMUSCULAR; INTRAVENOUS ONCE
Refills: 0 | Status: COMPLETED | OUTPATIENT
Start: 2025-08-01 | End: 2025-08-01

## 2025-08-01 RX ORDER — CEFAZOLIN SODIUM 2 G/100ML
2 INJECTION, SOLUTION INTRAVENOUS ONCE
Status: DISCONTINUED | OUTPATIENT
Start: 2025-08-01 | End: 2025-08-01 | Stop reason: HOSPADM

## 2025-08-01 RX ORDER — SODIUM CHLORIDE, SODIUM LACTATE, POTASSIUM CHLORIDE, CALCIUM CHLORIDE 600; 310; 30; 20 MG/100ML; MG/100ML; MG/100ML; MG/100ML
INJECTION, SOLUTION INTRAVENOUS CONTINUOUS PRN
Status: DISCONTINUED | OUTPATIENT
Start: 2025-08-01 | End: 2025-08-01

## 2025-08-01 RX ORDER — LABETALOL HYDROCHLORIDE 5 MG/ML
10 INJECTION, SOLUTION INTRAVENOUS ONCE AS NEEDED
Status: DISCONTINUED | OUTPATIENT
Start: 2025-08-01 | End: 2025-08-01 | Stop reason: HOSPADM

## 2025-08-01 RX ORDER — MIDAZOLAM HYDROCHLORIDE 1 MG/ML
2 INJECTION, SOLUTION INTRAMUSCULAR; INTRAVENOUS ONCE
Status: COMPLETED | OUTPATIENT
Start: 2025-08-01 | End: 2025-08-01

## 2025-08-01 RX ORDER — LIDOCAINE HYDROCHLORIDE 20 MG/ML
INJECTION, SOLUTION INFILTRATION; PERINEURAL AS NEEDED
Status: DISCONTINUED | OUTPATIENT
Start: 2025-08-01 | End: 2025-08-01

## 2025-08-01 RX ORDER — MIDAZOLAM HYDROCHLORIDE 1 MG/ML
1 INJECTION, SOLUTION INTRAMUSCULAR; INTRAVENOUS ONCE AS NEEDED
Status: DISCONTINUED | OUTPATIENT
Start: 2025-08-01 | End: 2025-08-01 | Stop reason: HOSPADM

## 2025-08-01 RX ORDER — ONDANSETRON HYDROCHLORIDE 2 MG/ML
4 INJECTION, SOLUTION INTRAVENOUS EVERY 8 HOURS PRN
Status: CANCELLED | OUTPATIENT
Start: 2025-08-01

## 2025-08-01 RX ORDER — MIDAZOLAM HYDROCHLORIDE 1 MG/ML
INJECTION, SOLUTION INTRAMUSCULAR; INTRAVENOUS CONTINUOUS PRN
Status: DISCONTINUED | OUTPATIENT
Start: 2025-08-01 | End: 2025-08-01

## 2025-08-01 RX ORDER — CEFAZOLIN 1 G/1
INJECTION, POWDER, FOR SOLUTION INTRAVENOUS AS NEEDED
Status: DISCONTINUED | OUTPATIENT
Start: 2025-08-01 | End: 2025-08-01

## 2025-08-01 RX ORDER — GLYCOPYRROLATE 0.2 MG/ML
0.2 INJECTION INTRAMUSCULAR; INTRAVENOUS ONCE
Status: DISCONTINUED | OUTPATIENT
Start: 2025-08-01 | End: 2025-08-01 | Stop reason: HOSPADM

## 2025-08-01 RX ORDER — PHENYLEPHRINE 10 MG/250 ML(40 MCG/ML)IN 0.9 % SOD.CHLORIDE INTRAVENOUS
CONTINUOUS PRN
Status: DISCONTINUED | OUTPATIENT
Start: 2025-08-01 | End: 2025-08-01

## 2025-08-01 RX ORDER — KETOROLAC TROMETHAMINE 30 MG/ML
15 INJECTION, SOLUTION INTRAMUSCULAR; INTRAVENOUS ONCE AS NEEDED
Status: DISCONTINUED | OUTPATIENT
Start: 2025-08-01 | End: 2025-08-01 | Stop reason: HOSPADM

## 2025-08-01 RX ORDER — ALBUTEROL SULFATE 0.83 MG/ML
2.5 SOLUTION RESPIRATORY (INHALATION) ONCE
Status: DISCONTINUED | OUTPATIENT
Start: 2025-08-01 | End: 2025-08-01 | Stop reason: HOSPADM

## 2025-08-01 RX ORDER — LIDOCAINE HYDROCHLORIDE 10 MG/ML
0.1 INJECTION, SOLUTION INFILTRATION; PERINEURAL ONCE
Status: DISCONTINUED | OUTPATIENT
Start: 2025-08-01 | End: 2025-08-01 | Stop reason: HOSPADM

## 2025-08-01 RX ADMIN — PHENYLEPHRINE HYDROCHLORIDE 100 MCG: 10 INJECTION INTRAVENOUS at 08:29

## 2025-08-01 RX ADMIN — PHENYLEPHRINE HYDROCHLORIDE 120 MCG: 10 INJECTION INTRAVENOUS at 09:07

## 2025-08-01 RX ADMIN — PHENYLEPHRINE HYDROCHLORIDE 120 MCG: 10 INJECTION INTRAVENOUS at 09:56

## 2025-08-01 RX ADMIN — PHENYLEPHRINE HYDROCHLORIDE 100 MCG: 10 INJECTION INTRAVENOUS at 10:59

## 2025-08-01 RX ADMIN — SUGAMMADEX 200 MG: 100 INJECTION, SOLUTION INTRAVENOUS at 11:43

## 2025-08-01 RX ADMIN — PHENYLEPHRINE HYDROCHLORIDE 100 MCG: 10 INJECTION INTRAVENOUS at 08:34

## 2025-08-01 RX ADMIN — CEFAZOLIN 2 G: 1 INJECTION, POWDER, FOR SOLUTION INTRAMUSCULAR; INTRAVENOUS at 08:10

## 2025-08-01 RX ADMIN — PHENYLEPHRINE HYDROCHLORIDE 200 MCG: 10 INJECTION INTRAVENOUS at 08:59

## 2025-08-01 RX ADMIN — FENTANYL CITRATE 50 MCG: 0.05 INJECTION, SOLUTION INTRAMUSCULAR; INTRAVENOUS at 08:13

## 2025-08-01 RX ADMIN — ALBUTEROL SULFATE 2.5 MG: 2.5 SOLUTION RESPIRATORY (INHALATION) at 13:00

## 2025-08-01 RX ADMIN — TRANEXAMIC ACID 1000 MG: 100 INJECTION INTRAVENOUS at 08:27

## 2025-08-01 RX ADMIN — PHENYLEPHRINE HYDROCHLORIDE 100 MCG: 10 INJECTION INTRAVENOUS at 10:46

## 2025-08-01 RX ADMIN — LIDOCAINE HYDROCHLORIDE 90 MG: 20 INJECTION, SOLUTION INFILTRATION; PERINEURAL at 08:13

## 2025-08-01 RX ADMIN — METOCLOPRAMIDE 10 MG: 10 TABLET ORAL at 07:26

## 2025-08-01 RX ADMIN — DEXAMETHASONE SODIUM PHOSPHATE 4 MG: 4 INJECTION, SOLUTION INTRAMUSCULAR; INTRAVENOUS at 08:32

## 2025-08-01 RX ADMIN — PHENYLEPHRINE HYDROCHLORIDE 100 MCG: 10 INJECTION INTRAVENOUS at 08:49

## 2025-08-01 RX ADMIN — FENTANYL CITRATE 50 MCG: 50 INJECTION INTRAMUSCULAR; INTRAVENOUS at 07:52

## 2025-08-01 RX ADMIN — PHENYLEPHRINE HYDROCHLORIDE 100 MCG: 10 INJECTION INTRAVENOUS at 08:46

## 2025-08-01 RX ADMIN — PHENYLEPHRINE HYDROCHLORIDE 100 MCG: 10 INJECTION INTRAVENOUS at 11:07

## 2025-08-01 RX ADMIN — SODIUM CHLORIDE, POTASSIUM CHLORIDE, SODIUM LACTATE AND CALCIUM CHLORIDE: 600; 310; 30; 20 INJECTION, SOLUTION INTRAVENOUS at 08:05

## 2025-08-01 RX ADMIN — Medication 0.2 MCG/KG/MIN: at 08:59

## 2025-08-01 RX ADMIN — MIDAZOLAM HYDROCHLORIDE 2 MG: 1 INJECTION, SOLUTION INTRAMUSCULAR; INTRAVENOUS at 07:52

## 2025-08-01 RX ADMIN — ROCURONIUM BROMIDE 50 MG: 10 INJECTION, SOLUTION INTRAVENOUS at 08:15

## 2025-08-01 RX ADMIN — PROPOFOL 170 MG: 10 INJECTION, EMULSION INTRAVENOUS at 08:14

## 2025-08-01 RX ADMIN — PHENYLEPHRINE HYDROCHLORIDE 100 MCG: 10 INJECTION INTRAVENOUS at 08:40

## 2025-08-01 RX ADMIN — POVIDONE-IODINE 1 APPLICATION: 5 SOLUTION TOPICAL at 06:33

## 2025-08-01 SDOH — HEALTH STABILITY: MENTAL HEALTH: CURRENT SMOKER: 1

## 2025-08-01 ASSESSMENT — PAIN - FUNCTIONAL ASSESSMENT
PAIN_FUNCTIONAL_ASSESSMENT: 0-10

## 2025-08-01 ASSESSMENT — PAIN DESCRIPTION - DESCRIPTORS: DESCRIPTORS: ACHING

## 2025-08-01 ASSESSMENT — PAIN SCALES - GENERAL
PAINLEVEL_OUTOF10: 0 - NO PAIN
PAIN_LEVEL: 0
PAINLEVEL_OUTOF10: 0 - NO PAIN
PAINLEVEL_OUTOF10: 0 - NO PAIN
PAINLEVEL_OUTOF10: 3
PAINLEVEL_OUTOF10: 0 - NO PAIN

## 2025-08-01 NOTE — ANESTHESIA PREPROCEDURE EVALUATION
Patient: Monse Aguilera    Procedure Information       Date/Time: 08/01/25 0745    Procedure: Revision Total Shoulder Reverse Arthroplasty (Right: Shoulder) - ARTHREX VIP  (Req Linda, Nahomy, Jd)  (Req 1st case)    [facility]: [facility] / [facility]    Surgeons: Anderson Tenorio MD            Relevant Problems   Cardiac   (+) CPVT (catecholaminergic polymorphic ventricular tachycardia)   (+) Electrocardiogram abnormal   (+) Essential hypertension   (+) Hyperlipidemia   (+) ICD (implantable cardioverter-defibrillator) in place   (+) Long QT syndrome      Neuro   (+) Depressive disorder      /Renal   (+) Calculus of kidney      Endocrine   (+) Hypothyroidism   (+) Obesity, morbid (Multi)      Musculoskeletal   (+) Osteoarthritis of right shoulder   (+) Primary osteoarthritis of right hip       Clinical information reviewed:   Tobacco  Allergies  Meds   Med Hx  Surg Hx  OB Status  Fam Hx  Soc   Hx        NPO Detail:  NPO/Void Status  Carbohydrate Drink Given Prior to Surgery? : N  Date of Last Liquid: 08/01/25  Time of Last Liquid: 0530  Date of Last Solid: 07/31/25  Time of Last Solid: 2330  Last Intake Type: Clear fluids  Time of Last Void: 0600         Physical Exam    Airway  Mallampati: II  TM distance: >3 FB  Mouth opening: 3 or more finger widths     Cardiovascular   Rhythm: regular  Rate: normal     Dental - normal exam     Pulmonary Breath sounds clear to auscultation     Abdominal Abdomen: soft             Anesthesia Plan    History of general anesthesia?: unknown/emergency  History of complications of general anesthesia?: no    ASA 3     general and other   There is reasoning for not using neuraxial anesthesia or a peripheral nerve block.  (Labs acceptable, EKG nl, Dr. Aragon input appreciated, no anticoags noted, Pt is not a difficult airway per past two intubation records)  The patient is a current smoker.  Education provided regarding risk of obstructive sleep apnea. (in appropriate  circumstances)  intravenous induction   Anesthetic plan and risks discussed with patient.    Plan discussed with CRNA, CAA and attending.

## 2025-08-01 NOTE — OP NOTE
Revision Total Shoulder Reverse Arthroplasty (R) Operative Note     Date: 2025  OR Location: [facility]    Name: Monse Aguilera, : 1960, Age: 65 y.o., MRN: [id number], Sex: female    Diagnosis  Pre-op Diagnosis      * Pain in prosthetic joint, initial encounter [T84.84XA] Post-op Diagnosis     * Pain in prosthetic joint, initial encounter [T84.84XA]     Procedures  Revision Total Shoulder Reverse Arthroplasty  08481 - NH ELAINE SHOULDER ARTHRPLSTY HUMERAL&GLENOID COMPNT      Surgeons      * Anderson Tenorio - Primary    Resident/Fellow/Other Assistant:  Surgeons and Role:  * No surgeons found with a matching role *    Staff:   Circulator: Kwadwo Kaba Person: Curt  Surgical Assistant: Loli  Surgical Assistant: Demetra    Anesthesia Staff: Anesthesiologist: Josiah Corea DO  CRNA: FAN Pagan-CRNA  C-AA: KISHOR Trinidad  SRNA: Pratik Liu    Procedure Summary  Anesthesia: Anesthesia type not filed in the log.  ASA: III  Estimated Blood Loss: 100 cc mL  Intra-op Medications:   Administrations occurring from 0745 to 1035 on 25:   Medication Name Total Dose   ceFAZolin (Ancef) vial 1 g 2 g   dexAMETHasone (Decadron) 4 mg/mL IV Syringe 2 mL 4 mg   fentaNYL (Sublimaze) injection 50 mcg/mL 50 mcg   LR infusion Cannot be calculated   lidocaine (Xylocaine) injection 2 % 90 mg   phenylephrine (Peter-Synephrine) 10 mg in sodium chloride 0.9% 250 mL (0.04 mg/mL) infusion (premix) 5.07 mg   phenylephrine (Peter-Synephrine) injection 940 mcg   propofol (Diprivan) injection 10 mg/mL 170 mg   rocuronium (ZeMuron) 50 mg/5 mL injection 50 mg   tranexamic acid (Cyklokapron) injection 1,000 mg   fentaNYL PF (Sublimaze) injection 50 mcg 50 mcg   midazolam (Versed) injection 2 mg 2 mg              Anesthesia Record               Intraprocedure I/O Totals          Intake    Tranexamic Acid 0.00 mL    The total shown is the total volume documented since Anesthesia Start was filed.    Phenylephrine Drip  0.00 mL    The total shown is the total volume documented since Anesthesia Start was filed.    Total Intake 0 mL          Specimen:   ID Type Source Tests Collected by Time   A : RIGHT SHOULDER BURSA, C.ACNES, HOLD FOR 14 DAYS Tissue SOFT TISSUE BIOPSY TISSUE/WOUND CULTURE/SMEAR Anderson Tneorio MD 8/1/2025 0926   B : RIGHT GLENOID TISSUE, C.ACNES, HOLD FOR 14 DAYS Tissue SOFT TISSUE BIOPSY TISSUE/WOUND CULTURE/SMEAR Anderson Tenorio MD 8/1/2025 0929   C : RIGHT ROTATOR CUFF TISSUE, C.ACNES, HOLD FOR 14 DAYS Tissue SOFT TISSUE BIOPSY TISSUE/WOUND CULTURE/SMEAR Anderson Tenorio MD 8/1/2025 0929   D : RIGHT HUMERAL HEAD STEM INTERFACE, C.ACNES, HOLD FOR 14 DAYS Tissue SOFT TISSUE BIOPSY TISSUE/WOUND CULTURE/SMEAR Anderson Tenorio MD 8/1/2025 0929   E : RIGHT HUMERAL BONE, C.ACNES, HOLD FOR 14 DAYS Tissue SOFT TISSUE BIOPSY TISSUE/WOUND CULTURE/SMEAR Anderson Tenorio MD 8/1/2025 0929                 Drains and/or Catheters: * None in log *    Tourniquet Times:         Implants:  Implants       Type Name Action Serial No.      Joint GLENOID PIN, TARGETER, 2.8MM, STAINLESS - [id number] Implanted       calcaneous chips 60cc Implanted [id number]     Implant POST, MODULAR CENTRAL, 25MM - [id number] Implanted      Screw SCREW, NON-LOCKING, 4.5MM X 24MM - [id number] Implanted      Joint BASEPLATE, GLENIOD, MODULAR, 24MM +4 LAT - [id number] Implanted      Screw SCREW, LOCKING, 5.5MM X 28MM - [id number] Implanted      Screw SCREW, LOCKING, 5.5MM X 32MM - [id number] Implanted      Screw SCREW, LOCKING, 5.5MM X 20MM - [id number] Implanted      Joint GLENOSPHERE, 33/24MM BASEPLATE TAPER - [id number] Wasted      Joint STEM, UNIVERS REVERS APEX, SZ 6 - [id number] Implanted      Joint SUTURE CUP, UNIVERS REVERS, 33 +2, RIGHT - [id number] Implanted      Joint INSERT, HUMERAL, XS 33 +3MM CONSTRAINED - [id number] Wasted       [employer] revers humeral spacer 33 +9 Implanted      Screw SCREW, NON-LOCKING, 4.5MM X 28MM - [id number]  Used, Not Implanted      Joint GLENOSPHERE, 36/24 BASEPLATE TAPER - [id number] Implanted       [facility] modular glenoid system constrained combination humeral insert Implanted               Findings: Central defect in the glenoid.  Ruptured subscapularis.    Indications: Monse Aguilera is an 65 y.o. female who is having surgery for PAINFUL RIGHT TOTAL SHOULDER REPLACEMENT.  Plan is to proceed with revision of her anatomic total shoulder replacement to a reverse total shoulder replacement.  Risks of surgery were explained the patient including postop pain, infection, stiffness, neurovascular injury, need for additional surgery treatment.  They understand the risks and is willing to proceed.    The patient was seen in the preoperative area. The risks, benefits, complications, treatment options, non-operative alternatives, expected recovery and outcomes were discussed with the patient. The possibilities of reaction to medication, pulmonary aspiration, injury to surrounding structures, bleeding, recurrent infection, the need for additional procedures, failure to diagnose a condition, and creating a complication requiring transfusion or operation were discussed with the patient. The patient concurred with the proposed plan, giving informed consent.  The site of surgery was properly noted/marked if necessary per policy. The patient has been actively warmed in preoperative area. Preoperative antibiotics have been ordered and given within 1 hours of incision. Venous thrombosis prophylaxis have been ordered including bilateral sequential compression devices    Procedure Details:     The patient was taken to the operating room and administered a preoperative block and general anesthesia.  She is placed in the beachchair position.  She was then prepped and draped in the usual sterile fashion.  Bony landmarks identified and marked.  The old incision was also marked.  A standard deltopectoral incision was made  incorporating the old incision.  The old scar was excised.  Subtenons tissue was dissected with the Bovie.  Flaps were developed.  The cephalic vein was identified.  He was carefully retracted laterally with the deltoid muscle.  Small venous branches were cauterized as needed.  There was some scar tissue in the subdeltoid area which was released Brown retractor was inserted.  Findings demonstrated complete rupture of the subscapularis.  The clavipectoral fascia was incised.  The axillary nerve was palpated underneath the subscapularis and protected throughout the case.  Brown retractor was inserted.  The humeral head was delivered into the field.  Inferior capsule was released.  The patient had a previous Eclipse in place this was removed.  The cage screw was removed.  The trunnion was removed.  All excess suture and anchors was removed.  Throughout the case several soft tissue cultures were obtained to include bursa, rotator cuff tissue, tissue between the humeral head and stem interface, humeral bone, and glenoid bone.  These were sent for microbiology and it was recommended they be held for 14 days to evaluate for C.acnes.  An endcap was inserted.  The tension structure towards the glenoid.  The glenoid was exposed.  The glenoid was levered out.  Findings demonstrated a defect in the superior anterior quadrant which was contained there was some medialization of the glenoid.  All excess soft tissue was removed and debrided.  At that point decision was made to place the guidewire for the glenosphere.  This was then drilled it was measured to a size 25.  Decision was made to use a size 25 post.  There appeared to be adequate fixation.  The central hole was drilled.  A 24+4 modular glenoid system baseplate was then assembled with a 25 mm post.  Prior to impacting the baseplate cancellous screw croutons allograft was packed into the superior anterior defect.  The baseplate was then impacted down and appeared to fit  well and appeared to be stable.  A compression screw was placed superiorly as this seemed to have the best area of fixation.  Locking screws were placed at the main to the holes.  Peripheral reaming was performed.  A size 33 mm +0 trial was impacted down.  Attention directed towards the humerus.  The humerus was then reamed with hand reamers to open up the canal.  Is then broached up to a size 6 apex stem with care taken to preserve 30 degrees of retroversion.  Posterior offset was noted.  The upper end of the humerus was then reamed.  Trials were then performed.  Based on concerns of impingement with rotation decision was made to upsize the glenosphere.  For this reason the glenosphere was removed and a size 36+0 glenosphere prosthesis was impacted down.  Central screw was inserted.  Attention was directed back to the humerus.  Several trials were performed.  Decision was made a use a size 9 mm spacer with a 3 mm constrained liner.  The constrained liner was chosen because of resistance surgery and concerns for stability.  The 9 mm spacer was inserted.  The 3 mm constrained liner was impacted down.  The shoulder was reduced.  There was minimal impingement with rotation.  The hip prosthesis appeared to be stable with abduction and rotation.  At that point extensive irrigation was performed.  Some of the rotator interval tissue was then closed with #2 FiberWire suture.  Vancomycin powder was inserted.  The deltopectoral closure was performed with 0.0 Vicryl.  Subcuticular 3.0 Vicryl.  Skin was closed with a 4 Monocryl subcuticular sutures.  Steri-Strips were applied.  Patient was placed in a sterile silver dressing and a DonJoy sling.  Patient taught the procedure well.  The no complications.  She was taken to recovery room in stable condition.  Thank you  Evidence of Infection: No   Complications:  None; patient tolerated the procedure well.    Disposition: PACU - hemodynamically stable.  Condition: stable          Task Performed by RNFA or Surgical Assistant:  An assistant was used throughout the case and assisted in retraction, visualization, and improved the flow of the case.          Additional Details: None    Attending Attestation: I was present and scrubbed for the entire procedure.    Anderson Tenorio  Phone Number: [phone]

## 2025-08-01 NOTE — ANESTHESIA PROCEDURE NOTES
Airway  Date/Time: 8/1/2025 8:17 AM  Reason: elective    Airway not difficult    Staffing  Performed: ZOE   Authorized by: Josiah Corea DO    Performed by: Pratik Liu  Patient location during procedure: OR    Patient Condition  Indications for airway management: anesthesia and airway protection  Patient position: sniffing  Planned trial extubation  Sedation level: deep     Final Airway Details   Preoxygenated: yes  Final airway type: endotracheal airway  Successful airway: ETT  Cuffed: yes   Successful intubation technique: video laryngoscopy (Glide)  Adjuncts used in placement: intubating stylet  Endotracheal tube insertion site: oral  Blade type: S3.  ETT size (mm): 7.0  Cormack-Lehane Classification: grade I - full view of glottis  Placement verified by: chest auscultation and capnometry   Measured from: lips  ETT to lips (cm): 21  Number of attempts at approach: 1  Number of other approaches attempted: 0

## 2025-08-01 NOTE — DISCHARGE INSTRUCTIONS
Keep dressing dry.  Sling for affected arm  Okay to move elbow wrist and hand.  No motion shoulder. Ice to shoulder as needed.  Hold physical therapy for now.  Please cancel appointment  Oxycodone for pain.  Keflex x 2 weeks postop 3 times a day  Follow-up with me 10 days.

## 2025-08-01 NOTE — ANESTHESIA POSTPROCEDURE EVALUATION
Patient: Monse Aguilera    Procedure Summary       Date: 08/01/25 Room / Location: [facility] / [facility]    Anesthesia Start: 0805 Anesthesia Stop: 1156    Procedure: Revision Total Shoulder Reverse Arthroplasty (Right: Shoulder) Diagnosis:       Pain in prosthetic joint, initial encounter      (PAINFUL RIGHT TOTAL SHOULDER REPLACEMENT)    Surgeons: Anderson Tenorio MD Responsible Provider: Josiah Corea DO    Anesthesia Type: general, other ASA Status: 3            Anesthesia Type: general, other    Vitals Value Taken Time   /71 08/01/25 13:55   Temp 36.1 °C (97 °F) 08/01/25 13:00   Pulse 77 08/01/25 13:55   Resp 13 08/01/25 13:55   SpO2 92 % 08/01/25 13:40       Anesthesia Post Evaluation    Patient location during evaluation: bedside  Patient participation: complete - patient participated  Level of consciousness: awake  Pain score: 0  Pain management: adequate  Airway patency: patent  Two or more strategies used to mitigate risk of obstructive sleep apnea  Cardiovascular status: acceptable  Respiratory status: acceptable  Hydration status: acceptable  Postoperative Nausea and Vomiting: none  Comments: See intraoperative record for anesthetic actions related to the preoperative anesthesia plan.        There were no known notable events for this encounter.

## 2025-08-01 NOTE — POST-PROCEDURE NOTE
Patient pulled from PACU, patient sitting up at the edge of the bed, denies dizziness or shortness of breath. Patient able to get her oxygen to 92%, does drop when resting but able to come back up with deep breathing. Once back in SDS, patient dressed with assistance and ambulated to the restroom with steady gait and her cane. Patient sat up in the chair and drinking coke.     1445- Patient talking with Dr. Corea, her oxygen at this time is 93% on room air. He is happy with this. She does have her incentive spirometer to take home and is aware she should keep taking deep breaths and coughing. Patient called her  to pick her up.    1528- Patient discharged via w/c and transport.

## 2025-08-03 LAB
BACTERIA SPEC CULT: NORMAL
GRAM STN SPEC: NORMAL

## 2025-08-08 LAB
BACTERIA SPEC CULT: NORMAL
GRAM STN SPEC: NORMAL

## 2025-08-22 DIAGNOSIS — E03.9 ACQUIRED HYPOTHYROIDISM: ICD-10-CM

## 2025-08-22 RX ORDER — LEVOTHYROXINE SODIUM 175 UG/1
175 TABLET ORAL
Qty: 90 TABLET | Refills: 1 | Status: SHIPPED | OUTPATIENT
Start: 2025-08-22 | End: 2026-02-18

## 2025-08-27 DIAGNOSIS — I10 ESSENTIAL HYPERTENSION: ICD-10-CM

## 2025-08-27 DIAGNOSIS — F32.A DEPRESSIVE DISORDER: ICD-10-CM

## 2025-08-27 RX ORDER — SERTRALINE HYDROCHLORIDE 100 MG/1
200 TABLET, FILM COATED ORAL DAILY
Qty: 90 TABLET | Refills: 1 | Status: SHIPPED | OUTPATIENT
Start: 2025-08-27

## 2025-08-27 RX ORDER — BUPROPION HYDROCHLORIDE 300 MG/1
300 TABLET ORAL EVERY MORNING
Qty: 90 TABLET | Refills: 1 | Status: SHIPPED | OUTPATIENT
Start: 2025-08-27 | End: 2026-02-23

## 2025-08-27 RX ORDER — LOSARTAN POTASSIUM 100 MG/1
100 TABLET ORAL DAILY
Qty: 90 TABLET | Refills: 1 | Status: SHIPPED | OUTPATIENT
Start: 2025-08-27 | End: 2026-02-23

## (undated) DEVICE — Device

## (undated) DEVICE — SUTURE, VICRYL, 0, 36 IN, CT-1, UNDYED

## (undated) DEVICE — STRIP, SKIN CLOSURE, STERI STRIP, REINFORCED, 0.5 X 4 IN

## (undated) DEVICE — WOUND SYSTEM, DEBRIDEMENT & CLEANING, O.R DUOPAK

## (undated) DEVICE — STOCKINETTE, IMPERVIOUS, 12 X 48 IN, LF, STERILE

## (undated) DEVICE — BLADE, SAW,SAGGITAL, THICK, NO OFFSET

## (undated) DEVICE — SOLUTION, IRRIGATION, X RX SODIUM CHL 0.9%, 1000ML BTL

## (undated) DEVICE — SYRINGE, 10 CC, LUER LOCK

## (undated) DEVICE — DRESSING, SILVERLON FLEXIBLE ISLAND, 4 X 11IN

## (undated) DEVICE — DRAPE, TIBURON, HIP W/POUCHES

## (undated) DEVICE — DRAPE, LARGE TOWEL, NON- FENESTRATED, 17X23, CLEAR, N/S

## (undated) DEVICE — DRAPE, INCISE, ANTIMICROBIAL, IOBAN 2, 13 X 13 IN, DISPOSABLE, STERILE

## (undated) DEVICE — GLOVE, SURGICAL, PROTEXIS PI ORTHO, 7.5, PF, LF

## (undated) DEVICE — DRAPE, SHEET, 17 X 23 IN

## (undated) DEVICE — SOLUTION, INJECTION, SODIUM CHLORIDE 9%, 3000ML

## (undated) DEVICE — IMMOBILIZER, ULTRASLING III, LARGE

## (undated) DEVICE — CATHETER TRAY, URETHRAL, FOLEY, 16 FR, SILICONE

## (undated) DEVICE — DRAPE PACK, ORTHOPEDIC, SHOULDER, W/POUCH

## (undated) DEVICE — DRAPE, ISOLATION, INCISE, W/POUCH, STERI DRAPE, 125 X 83 IN, DISPOSABLE, STERILE

## (undated) DEVICE — SUTURE, TAPE 2MM X 54 DA P2 FIBERWIRE

## (undated) DEVICE — DRAPE, SHEET, U, W/ADHESIVE STRIP, IMPERVIOUS, 60 X 70 IN, DISPOSABLE, LF, STERILE

## (undated) DEVICE — SUTURE, VICRYL, 2-0, 18 IN, UNDYED

## (undated) DEVICE — SUTURE, VICRYL, 3-0, 27 IN, CT-1, UNDYED

## (undated) DEVICE — DRAPE, SHEET, LARGE, 70 X 85IN, STERILE

## (undated) DEVICE — PACK, BEACH CHAIR SHOULDER

## (undated) DEVICE — DRESSING, MEPILEX BORDER, POST-OP AG, 4 X 12 IN

## (undated) DEVICE — BANDAGE, COBAN 2, LAYER LITE COMPRESSION, 4 IN, LF

## (undated) DEVICE — GLOVE, SURGICAL, PROTEXIS PI , 8.0, PF, LF

## (undated) DEVICE — SUTURE, FIBERWIRE 2, T-5 TAPER NEEDLE, 38"

## (undated) DEVICE — ELECTRODE, ELECTROSURGICAL, PENCIL, HAND CONTROL, BLADE, W/SMOKE EVACUATION, W/HOLSTER, 10 FT CORD

## (undated) DEVICE — SKIN CLOSURE SYS, PREMIERPRO EXOFIN, 1-4CM X 22CM, 1.75G TUBE

## (undated) DEVICE — DRAPE, INCISE, ANTIMICROBIAL, IOBAN 2, LARGE, 17 X 23 IN, DISPOSABLE, STERILE

## (undated) DEVICE — GLOVE, SURGICAL, BIOGEL, OPTIFIT, SZ 8.0

## (undated) DEVICE — MIXER, CEMENT, MIXEVAC III HIGH VACUUM KIT, STERILE

## (undated) DEVICE — INTERPULSE HANDPIECE SET W/ 10FT SUCTION TUBING

## (undated) DEVICE — CEMENT, MIXEVAC III, 10S BOWL, KNEES

## (undated) DEVICE — GLOVE, SURGICAL, PROTEXIS PI , 7.5, PF, LF

## (undated) DEVICE — RETRIEVER, SUTURE, HEWSON

## (undated) DEVICE — COVER, BACK TABLE, PADDED, HD FAN FOLD, 80 X 90

## (undated) DEVICE — NEEDLE, SCORPION, HD

## (undated) DEVICE — GOWN, SURGICAL, SIRUS, NON REINFORCED, LARGE

## (undated) DEVICE — IMPLANTABLE DEVICE: Type: IMPLANTABLE DEVICE | Site: SHOULDER | Status: NON-FUNCTIONAL

## (undated) DEVICE — TUBING, IRRIGATION, HIGH FLOW, HAND PIECE SET

## (undated) DEVICE — NEEDLE, SCORPION MULTIFIRE

## (undated) DEVICE — IRRIGATION SYSTEM, WOUND, SURGIPHOR, 450ML, STERILE

## (undated) DEVICE — APPLICATOR, CHLORAPREP, W/ORANGE TINT, 26ML

## (undated) DEVICE — SCREW SIZER, ECLIPSE CAGE

## (undated) DEVICE — NEEDLE, HYPODERMIC, NEEDLE PRO, 25G X 1.5, ORANGE

## (undated) DEVICE — GLOVE, SURGICAL, PROTEXIS PI BLUE W/NEUTHERA, 7.5, PF, LF

## (undated) DEVICE — SUTURE, MONOCRYL, 4-0, 27 IN, PS-2, UNDYED

## (undated) DEVICE — DRAPE, SHEET, U, STERI DRAPE, 47 X 51 IN, DISPOSABLE, STERILE

## (undated) DEVICE — SPONGE, LAP, XRAY DECT, SC+RFID, 18X18, NO LOOP, STERILE

## (undated) DEVICE — BLADE, SAW, SAGITTAL, 21 X 84.5 X 0.89 MM, STAINLESS STEEL, STERILE

## (undated) DEVICE — INTERPULSE HANDPIECE SET, W/RETRACTABLE COAXIAL FAN SPRAY TIP & SUCTION TUBE

## (undated) DEVICE — SYRINGE, TOOMEY, IRRIGATION, 60ML, INDIVIDUAL WRAP, STERILE

## (undated) DEVICE — FACE SHIELD, OPTI-COM

## (undated) DEVICE — SUTURE, VICRYL, 2-0, 36 IN, CT-1, UNDYED

## (undated) DEVICE — ADHESIVE, SKIN, DERMABOND ADVANCED, 15CM, PEN-STYLE

## (undated) DEVICE — DRESSING, ABDOMINAL, TENDERSORB, 8 X 7-1/2 IN, STERILE

## (undated) DEVICE — DRILL BIT, 3.0MM GLENOID, V-SHAPE FLUTE, STERILE

## (undated) DEVICE — BLADE, SAW, SAGITTAL, 25 X 73 X 1.24MM, SS, STERILE

## (undated) DEVICE — SUTURE, ETHIBOND, P2, V-37, 30 IN, GREEN

## (undated) DEVICE — SPONGE, LAP, X-RAY, RF DETECT, 18 X 18IN, STERILE

## (undated) DEVICE — CLOSURE SYSTEM, DERMABOND, PRINEO, 22CM, STERILE